# Patient Record
Sex: FEMALE | Race: WHITE | Employment: OTHER | URBAN - METROPOLITAN AREA
[De-identification: names, ages, dates, MRNs, and addresses within clinical notes are randomized per-mention and may not be internally consistent; named-entity substitution may affect disease eponyms.]

---

## 2018-10-08 ENCOUNTER — TELEPHONE (OUTPATIENT)
Dept: HEMATOLOGY | Age: 73
End: 2018-10-08

## 2018-10-12 ENCOUNTER — OFFICE VISIT (OUTPATIENT)
Dept: HEMATOLOGY | Age: 73
End: 2018-10-12

## 2018-10-12 VITALS
TEMPERATURE: 97.5 F | HEART RATE: 67 BPM | OXYGEN SATURATION: 97 % | SYSTOLIC BLOOD PRESSURE: 121 MMHG | DIASTOLIC BLOOD PRESSURE: 58 MMHG | WEIGHT: 165 LBS

## 2018-10-12 DIAGNOSIS — K74.3 PRIMARY BILIARY CHOLANGITIS (HCC): Primary | ICD-10-CM

## 2018-10-12 PROBLEM — R18.8 ASCITES: Status: ACTIVE | Noted: 2018-10-12

## 2018-10-12 PROBLEM — D69.6 THROMBOCYTOPENIA (HCC): Status: ACTIVE | Noted: 2018-10-12

## 2018-10-12 PROBLEM — K74.60 CIRRHOSIS (HCC): Status: ACTIVE | Noted: 2018-10-12

## 2018-10-12 RX ORDER — SPIRONOLACTONE 100 MG/1
TABLET, FILM COATED ORAL DAILY
COMMUNITY
End: 2020-02-10 | Stop reason: SDUPTHER

## 2018-10-12 RX ORDER — URSODIOL 500 MG/1
500 TABLET, FILM COATED ORAL
COMMUNITY
End: 2020-05-14

## 2018-10-12 RX ORDER — FUROSEMIDE 40 MG/1
TABLET ORAL DAILY
COMMUNITY
End: 2020-02-10 | Stop reason: SDUPTHER

## 2018-10-12 NOTE — PROGRESS NOTES
[address] [facility] Cristal Codey Matthews MD, SATHYA AlejandraSLD ABHISHEK Roberts, RICHARD Gamble, [facility]   ABHISHEK Solorio NP [address] [facility] 136 
  at [address] 
  [address] [place] [address], [facility] Mal Barajas  22. 
  [phone] FAX: [address] 
  [facility] 
  [address], [address] [facility], [address] 
  [phone] FAX: [phone] Patient Care Team: 
Leanne Lopez MD as PCP - [facility]) Problem List  Date Reviewed: 10/12/2018 Codes Class Noted Primary biliary cholangitis (HCC) ICD-10-CM: K74.3 ICD-9-CM: 571.6  10/12/2018 Cirrhosis ([facility] [address] ICD-10-CM: K74.60 ICD-9-CM: 571.5  10/12/2018 Thrombocytopenia ([facility] [address] ICD-10-CM: D69.6 ICD-9-CM: 287.5  10/12/2018 Ascites ICD-10-CM: R18.8 ICD-9-CM: 789.59  10/12/2018 The clinicians listed above have asked me to see Rangel Medina in consultation regarding management of cirrhosis secondary to [facility]. All medical records sent by the referring physicians were reviewed including imaging studies The patient is a 68 y.o.  female who was found to have chronic liver disease, cirrhosis and PBC in 2017 when she developed ascites. An assessment of liver fibrosis with biopsy or elastography has not been performed. Serologic evaluation for markers of chronic liver disease was negative for HCV, HBV, NEYMAR, The patient has developed the following complications of cirrhosis: ascites, The patient notes itching, swelling of the abdomen, The patient has not experienced pain in the right side over the liver, dry eyes, dry mouth, dark urine, The patient completes all daily activities without any functional limitations. ASSESSMENT AND PLAN: 
Cirrhosis Cirrhosis is secondary to [facility]. The diagnosis of cirrhosis is based upon laboratory studies, imaging and complications of cirrhosis. The CTP is 7. Child class B. The MELD score is 9. Primary Biliary Cholangitis The diagnosis is based upon serology, and an elevation in ALP. Serologic testing for causes of chronic liver disease were ordered. These were negative for  ASMA, AMA A liver biopsy has not been performed. Have performed laboratory testing to monitor liver function and degree of liver injury. This included BMP, hepatic panel, CBC with platelet count, INR. Liver transaminases are normal.  ALP is elevated. Total bilirubin is elevated. Serum albumin is depressed. The platelet count is depressed. The patient will be started on JOLIE at a dose of 1500 mg every day. The side effects of JOLIE including a 2% risk of itching and a 2% risk of diarrhea were discussed. Ascites Has resolved with current dose of diuretics. Will continue the current dose of diuretics at step 1. Lower extremity edema This has resolved with current dose of diuretics. Screening for Esophageal varices The patient has small esophageal varices without prior bleeding. The last EGD to assess for varices was performed in 3/2017. Will schedule for EGD to assess for varices and need for banding. Hepatic encephalopathy This has not developed to date. There is no need for treatment with lactulose and/or Xifaxan at this time. No need to restrict dietary protein at this time. Itching This is secondary to the underlying liver disease. It is mild and the patient does not feel like she needs any medical treatment for this at this time. This will be treated with Periactin up to TID if needed. Common anti-histamines such as benedryl, atarax are lkess effective for the itching of liver disease. Screening for Hepatocellular Carcinoma [facility] [address] screening has recently been performed and does not suggest [facility] [address] The next liver imaging study will be performed in 4/2019. Treatment of other medical problems in patients with chronic liver disease There are no contraindications for the patient to take any medications that are necessary for treatment of other medical issues. The patient does not consume alcohol daily. Normal doses of acetaminophen can be used for pain as needed. Normal doses of acetaminophen as recommended on the label are not hepatotoxic, even in patients with cirrhosis. The patient has cirrhosis. Patients with cirrhosis should not use NSAIDs if possible as this is associated with a higher rate of ILEANA. Counseling for alcohol in patients with chronic liver disease The patient has cirrhosis and was advised to be abstinent from all alcohol including non-alcoholic beer which does contain some alcohol. The patient does not consume any significant amount of alcohol. Thrombocytopenia This is secondary to cirrhosis. There is no evidence of overt bleeding. No treatment is required. The platelet count is adequate for the patient to undergo procedures without the need for platelet transfusion or platelet growth factors. Osteoporosis The risk of osteoporosis is increased in patients with PBC and cirrhosis. DEXA bone density to assess for osteoporosis has not been performed. This should be ordered by the patients primary care physician. Vaccinations Routine vaccinations against other bacterial and viral agents can be performed as indicated. Annual flu vaccination should be administered if indicated. ALLERGIES Allergies Allergen Reactions  Latex Hives MEDICATIONS Current Outpatient Prescriptions Medication Sig  
 ursodiol (ACTIGALL) 500 mg tablet Take 500 mg by mouth three (3) times daily (with meals).  furosemide (LASIX) 40 mg tablet Take  by mouth daily.  spironolactone (ALDACTONE) 100 mg tablet Take  by mouth daily. No current facility-administered medications for this visit. SYSTEM REVIEW NOT RELATED TO LIVER DISEASE OR REVIEWED ABOVE: 
Constitution systems: Negative for fever, chills, weight gain, weight loss. Eyes: Negative for visual changes. ENT: Negative for sore throat, painful swallowing. Respiratory: Negative for cough, hemoptysis, SOB. Cardiology: Negative for chest pain, palpitations. GI:  Negative for constipation or diarrhea. : Negative for urinary frequency, dysuria, hematuria, nocturia. Skin: Negative for rash. Hematology: Negative for easy bruising, blood clots. Musculo-skelatal: Negative for back pain, muscle pain, weakness. Neurologic: Negative for headaches, dizziness, vertigo, memory problems not related to HE. Psychology: Negative for anxiety, depression. FAMILY HISTORY: 
The patient is adopted and does not know any of his family history. SOCIAL HISTORY: 
The patient is . The patient has 3 children, and 15 grandchildren. The patient has never used tobacco products. The patient has never consumed significant amounts of alcohol. The patient used to work as . PHYSICAL EXAMINATION: 
Visit Vitals  /58 (BP 1 Location: Left arm, BP Patient Position: Sitting)  Pulse 67  Temp 97.5 °F (36.4 °C) (Tympanic)  Wt 165 lb (74.8 kg)  SpO2 97% General: No acute distress. Eyes: Sclera anicteric. ENT: No oral lesions. Thyroid normal. 
Nodes: No adenopathy. Skin: No spider angiomata. No jaundice. No palmar erythema. Respiratory: Lungs clear to auscultation. Cardiovascular: Regular heart rate. No murmurs. No JVD. Abdomen: Soft non-tender. Liver size normal to percussion/palpation. Spleen not palpable. No obvious ascites. Extremities: No edema. No muscle wasting. No gross arthritic changes. Neurologic: Alert and oriented. Cranial nerves grossly intact. No asterixis. LABORATORY STUDIES: 
Liver Kathryn of [address] [place] [address] & Units 10/12/2018 WBC 3.4 - 10.8 x10E3/uL 4.6 ANC 1.4 - 7.0 x10E3/uL 2.9 HGB 11.1 - 15.9 g/dL 13.8  - 379 x10E3/uL 130 (L) INR 0.8 - 1.2 1.2 AST 0 - 40 IU/L 23 ALT 0 - 32 IU/L 11 Alk Phos 39 - 117 IU/L 222 (H) Bili, Total 0.0 - 1.2 mg/dL 1.9 (H) Bili, Direct 0.00 - 0.40 mg/dL 0.82 (H) Albumin 3.5 - 4.8 g/dL 3.0 (L) BUN 8 - 27 mg/dL 11 Creat 0.57 - 1.00 mg/dL 0.92 Na 134 - 144 mmol/L 138  
K 3.5 - 5.2 mmol/L 4.1 Cl 96 - 106 mmol/L 105 CO2 20 - 29 mmol/L 21 Glucose 65 - 99 mg/dL 101 (H) SEROLOGIES: 
8/2018. HBsurface antigen negative, anti-HBcore total negative, anti-HBsurface negative, anti-HCV negative, NEYMAR negative Serologies Latest Ref Rng & Units 10/12/2018 NEYMAR, IFA  Negative ASMCA 0 - 19 Units 73 (H)  
M2 Ab 0.0 - 20.0 Units 25.2 (H) LIVER HISTOLOGY: 
Not available or performed ENDOSCOPIC PROCEDURES: 
3/2017. Colonoscopy by Dr Yina Haley. No ployps. 3/2017. EGD by Dr Yina Haley. Grade 1 esophageal varices. Mild portal gastropathy RADIOLOGY: 
10/2018. Ultrasound of liver. Echogenic consistent with cirrhosis. No liver mass lesions. No dilated bile ducts. No ascites. OTHER TESTING: 
Not available or performed FOLLOW-UP: 
All of the issues listed above in the Assessment and Plan were discussed with the patient. All questions were answered. The patient expressed a clear understanding of the above. [address] [address] in 4 weeks to assess response to JOLIE. MD Fei De Leon Dep[address] Huy De Esposito [address] [place] [address], [address] [facility], [facility]  22. 
[phone] [facility]

## 2018-10-12 NOTE — PROGRESS NOTES
Chief Complaint Patient presents with  New Patient Visit Vitals  /58 (BP 1 Location: Left arm, BP Patient Position: Sitting)  Pulse 67  Temp 97.5 °F (36.4 °C) (Tympanic)  Wt 165 lb (74.8 kg)  SpO2 97% PHQ over the last two weeks 10/12/2018 Little interest or pleasure in doing things Not at all Feeling down, depressed, irritable, or hopeless Not at all Total Score PHQ 2 0 Learning Assessment 10/12/2018 PRIMARY LEARNER Patient BARRIERS PRIMARY LEARNER NONE  
CO-LEARNER CAREGIVER No  
PRIMARY LANGUAGE ENGLISH  
LEARNER PREFERENCE PRIMARY LISTENING  
ANSWERED BY patient RELATIONSHIP SELF Abuse Screening Questionnaire 10/12/2018 Do you ever feel afraid of your partner? Madelyn Atkinson Are you in a relationship with someone who physically or mentally threatens you? Madelyn Atkinson Is it safe for you to go home?  Olivia Ochoa

## 2018-10-12 NOTE — MR AVS SNAPSHOT
[address] [facility] [phone] [address] [place] [address] 
[phone] Patient: Norm Potts MRN: [id number] SFY:[id number] Visit Information Date & Time Provider Department Dept. Phone Encounter #  
 10/12/2018  8:15 AM Taurus Joy Tamiko Corado of [facility] [id number] Follow-up Instructions Return in about 4 weeks (around 11/9/2018) for NP. Upcoming Health Maintenance Date Due Hepatitis C Screening 1945 DTaP/Tdap/Td series (1 - Tdap) 7/16/1966 Shingrix Vaccine Age 50> (1 of 2) 7/16/1995 BREAST CANCER SCRN MAMMOGRAM 7/16/1995 FOBT Q 1 YEAR AGE 50-75 7/16/1995 GLAUCOMA SCREENING Q2Y 7/16/2010 Bone Densitometry (Dexa) Screening 7/16/2010 Pneumococcal 65+ Low/Medium Risk (1 of 2 - PCV13) 7/16/2010 Influenza Age 5 to Adult 8/1/2018 Allergies as of 10/12/2018  Review Complete On: 10/12/2018 By: Aurelia Abarca Severity Noted Reaction Type Reactions Latex  10/12/2018    Hives Current Immunizations  Never Reviewed No immunizations on file. Not reviewed this visit You Were Diagnosed With   
  
 Codes Comments Primary biliary cholangitis (HCC)    -  Primary ICD-10-CM: C37.4 ICD-9-CM: 571.6 Vitals BP Pulse Temp Weight(growth percentile) SpO2 Smoking Status 121/58 (BP 1 Location: Left arm, BP Patient Position: Sitting) 67 97.5 °F (36.4 °C) (Tympanic) 165 lb (74.8 kg) 97% Never Smoker Vitals History Your Updated Medication List  
  
   
This list is accurate as of 10/12/18  9:57 AM.  Always use your most recent med list.  
  
  
  
  
 furosemide 40 mg tablet Commonly known as:  LASIX Take  by mouth daily. spironolactone 100 mg tablet Commonly known as:  ALDACTONE Take  by mouth daily. ursodiol 500 mg tablet Commonly known as:  ACTIGALL Take 500 mg by mouth three (3) times daily (with meals). We Performed the Following ACTIN (SMOOTH MUSCLE) ANTIBODY [id number] CPT(R)] ANTINUCLEAR ANTIBODIES, IFA [id number] CPT(R)] CBC WITH AUTOMATED DIFF [63948 CPT(R)] HEPATIC FUNCTION PANEL [87069 CPT(R)] METABOLIC PANEL, BASIC [36209 CPT(R)] MITOCHONDRIAL M2 AB [id number] CPT(R)] PROTHROMBIN TIME + INR [96283 CPT(R)] Follow-up Instructions Return in about 4 weeks (around 11/9/2018) for NP. To-Do List   
 10/12/2018 Imaging:  [employer] Introducing [facility]! Sasha Mendieta introduces [employer] patient portal. Now you can access parts of your medical record, email your doctor's office, and request medication refills online. 1. In your internet browser, go to [email or web address] 2. Click on the First Time User? Click Here link in the Sign In box. You will see the New Member Sign Up page. 3. Enter your [employer] Access Code exactly as it appears below. You will not need to use this code after youve completed the sign-up process. If you do not sign up before the expiration date, you must request a new code. · [employer] Access Code: [email or web address] Expires: 1/10/2019  8:02 AM 
 
4. Enter the last four digits of your Social Security Number (xxxx) and Date of Birth (mm/dd/yyyy) as indicated and click Submit. You will be taken to the next sign-up page. 5. Create a [employer] ID. This will be your [employer] login ID and cannot be changed, so think of one that is secure and easy to remember. 6. Create a [employer] password. You can change your password at any time. 7. Enter your Password Reset Question and Answer. This can be used at a later time if you forget your password. 8. Enter your e-mail address. You will receive e-mail notification when new information is available in [address] 9. Click Sign Up. You can now view and download portions of your medical record.  
10. Click the Download Summary menu link to download a portable copy of your medical information. If you have questions, please visit the Frequently Asked Questions section of the [employer] website. Remember, [employer] is NOT to be used for urgent needs. For medical emergencies, dial 911. Now available from your iPhone and Android! Please provide this summary of care documentation to your next provider. Your primary care clinician is listed as Eva Bro. If you have any questions after today's visit, please call [phone].

## 2018-10-13 LAB
BASOPHILS # BLD AUTO: 0 X10E3/UL (ref 0–0.2)
BASOPHILS NFR BLD AUTO: 0 %
EOSINOPHIL # BLD AUTO: 0.1 X10E3/UL (ref 0–0.4)
EOSINOPHIL NFR BLD AUTO: 2 %
ERYTHROCYTE [DISTWIDTH] IN BLOOD BY AUTOMATED COUNT: 13.9 % (ref 12.3–15.4)
HCT VFR BLD AUTO: 41.4 % (ref 34–46.6)
HGB BLD-MCNC: 13.8 G/DL (ref 11.1–15.9)
IMM GRANULOCYTES # BLD: 0 X10E3/UL (ref 0–0.1)
IMM GRANULOCYTES NFR BLD: 0 %
INR PPP: 1.2 (ref 0.8–1.2)
LYMPHOCYTES # BLD AUTO: 0.8 X10E3/UL (ref 0.7–3.1)
LYMPHOCYTES NFR BLD AUTO: 18 %
MCH RBC QN AUTO: 33.7 PG (ref 26.6–33)
MCHC RBC AUTO-ENTMCNC: 33.3 G/DL (ref 31.5–35.7)
MCV RBC AUTO: 101 FL (ref 79–97)
MONOCYTES # BLD AUTO: 0.8 X10E3/UL (ref 0.1–0.9)
MONOCYTES NFR BLD AUTO: 18 %
NEUTROPHILS # BLD AUTO: 2.9 X10E3/UL (ref 1.4–7)
NEUTROPHILS NFR BLD AUTO: 62 %
PLATELET # BLD AUTO: 130 X10E3/UL (ref 150–379)
PROTHROMBIN TIME: 11.9 SEC (ref 9.1–12)
RBC # BLD AUTO: 4.1 X10E6/UL (ref 3.77–5.28)
WBC # BLD AUTO: 4.6 X10E3/UL (ref 3.4–10.8)

## 2018-10-14 LAB
ALBUMIN SERPL-MCNC: 3 G/DL (ref 3.5–4.8)
ALP SERPL-CCNC: 222 IU/L (ref 39–117)
ALT SERPL-CCNC: 11 IU/L (ref 0–32)
AST SERPL-CCNC: 23 IU/L (ref 0–40)
BILIRUB DIRECT SERPL-MCNC: 0.82 MG/DL (ref 0–0.4)
BILIRUB SERPL-MCNC: 1.9 MG/DL (ref 0–1.2)
BUN SERPL-MCNC: 11 MG/DL (ref 8–27)
BUN/CREAT SERPL: 12 (ref 12–28)
CALCIUM SERPL-MCNC: 8.8 MG/DL (ref 8.7–10.3)
CHLORIDE SERPL-SCNC: 105 MMOL/L (ref 96–106)
CO2 SERPL-SCNC: 21 MMOL/L (ref 20–29)
CREAT SERPL-MCNC: 0.92 MG/DL (ref 0.57–1)
GLUCOSE SERPL-MCNC: 101 MG/DL (ref 65–99)
MITOCHONDRIA M2 IGG SER-ACNC: 25.2 UNITS (ref 0–20)
POTASSIUM SERPL-SCNC: 4.1 MMOL/L (ref 3.5–5.2)
PROT SERPL-MCNC: 6.9 G/DL (ref 6–8.5)
SODIUM SERPL-SCNC: 138 MMOL/L (ref 134–144)

## 2018-10-15 LAB
ACTIN IGG SERPL-ACNC: 73 UNITS (ref 0–19)
ANA TITR SER IF: NEGATIVE {TITER}

## 2018-10-18 ENCOUNTER — HOSPITAL ENCOUNTER (OUTPATIENT)
Dept: ULTRASOUND IMAGING | Age: 73
Discharge: HOME | End: 2018-10-18
Attending: INTERNAL MEDICINE
Payer: MEDICARE

## 2018-10-18 DIAGNOSIS — K74.3 PRIMARY BILIARY CHOLANGITIS (HCC): ICD-10-CM

## 2018-10-18 PROCEDURE — 76705 ECHO EXAM OF ABDOMEN: CPT

## 2018-11-12 ENCOUNTER — TELEPHONE (OUTPATIENT)
Dept: HEMATOLOGY | Age: 73
End: 2018-11-12

## 2018-11-12 NOTE — TELEPHONE ENCOUNTER
I called the patient to remind her of her 11/14 appt with Candice Chery at 2 PM. Patient confirmed that she will attend this appt. She had no questions at this time.     Kay Morse

## 2018-11-14 ENCOUNTER — OFFICE VISIT (OUTPATIENT)
Dept: HEMATOLOGY | Age: 73
End: 2018-11-14

## 2018-11-14 VITALS
WEIGHT: 181 LBS | HEART RATE: 70 BPM | BODY MASS INDEX: 33.31 KG/M2 | SYSTOLIC BLOOD PRESSURE: 109 MMHG | TEMPERATURE: 97.4 F | DIASTOLIC BLOOD PRESSURE: 54 MMHG | OXYGEN SATURATION: 96 % | HEIGHT: 62 IN

## 2018-11-14 DIAGNOSIS — K74.69 OTHER CIRRHOSIS OF LIVER (HCC): ICD-10-CM

## 2018-11-14 DIAGNOSIS — K74.3 PRIMARY BILIARY CHOLANGITIS (HCC): Primary | ICD-10-CM

## 2018-11-14 NOTE — PROGRESS NOTES
1. Have you been to the ER, urgent care clinic since your last visit? Hospitalized since your last visit? No    2. Have you seen or consulted any other health care providers outside of the [address] [place] [address] since your last visit? Include any pap smears or colon screening. Yes Where: Yes, PCP     Chief Complaint   Patient presents with    Follow-up     Visit Vitals  /54 (BP 1 Location: Left arm, BP Patient Position: Sitting)   Pulse 70   Temp 97.4 °F (36.3 °C) (Tympanic)   Ht 5' 2\" (1.575 m)   Wt 181 lb (82.1 kg)   SpO2 96%   BMI 33.11 kg/m²     PHQ over the last two weeks 11/14/2018   Little interest or pleasure in doing things Not at all   Feeling down, depressed, irritable, or hopeless Not at all   Total Score PHQ 2 0     Fall Risk Assessment, last 12 mths 11/14/2018   Able to walk? Yes   Fall in past 12 months? No       Learning Assessment 11/14/2018   PRIMARY LEARNER Patient   BARRIERS PRIMARY LEARNER NONE   CO-LEARNER CAREGIVER No   PRIMARY LANGUAGE ENGLISH   LEARNER PREFERENCE PRIMARY LISTENING   ANSWERED BY patient   RELATIONSHIP SELF     Abuse Screening Questionnaire 11/14/2018   Do you ever feel afraid of your partner? N   Are you in a relationship with someone who physically or mentally threatens you? N   Is it safe for you to go home?  Hannah Zayas

## 2018-11-14 NOTE — PROGRESS NOTES
[address] [address], [place], [address], [place], [place]       Crispin De La Rosa, ABHISHEK Farley, RICHARD Maldonado, [facility]   Esteban Arriaza, ABHISHEK Diallo, ABHISHEK Pollard [facility], [facility] [address]    at [facility]    [address] [facility], [address]  22.    [phone]    FAX: [address] [place] [address]    at [address], [address]    [phone]    FAX: [phone]     Patient Care Team:  Estefania Grover MD as PCP - General (Family Practice)    Problem List  Date Reviewed: 10/28/2018          Codes Class Noted    Primary biliary cholangitis [facility]) ICD-10-CM: K74.3  ICD-9-CM: 571.6  10/12/2018        Cirrhosis ([facility] [address] ICD-10-CM: K74.60  ICD-9-CM: 571.5  10/12/2018        Thrombocytopenia ([facility] [address] ICD-10-CM: D69.6  ICD-9-CM: 287.5  10/12/2018        Ascites ICD-10-CM: R18.8  ICD-9-CM: 789.59  10/12/2018            Wendy Maza returns to the [address] for management of primary biliary cholangitis. The active problem list, all pertinent past medical history, medications, endoscopic studies, radiologic findings and laboratory findings related to the liver disorder were reviewed with the patient. The patient is a 68 y.o.  female who was found to have chronic liver disease, cirrhosis and PBC in 2017 when she developed ascites. An assessment of liver fibrosis with biopsy or elastography has not been performed. Serologic evaluation for markers of chronic liver disease was negative for HCV, HBV, NEYMAR. Positive for AMA. The patient has developed the following complications of cirrhosis: ascites and lower extremity edema. The patient notes itching.      The patient has not experienced pain in the right side over the liver, dry eyes, dry mouth, dark urine,     The patient completes all daily activities without any functional limitations. She is tolerating the JOLIE without incident. She is curious about the medication Dr. Monalisa Schilling had mentioned to her which was a once a week pill. This was likely Jaymie Custard, as with cirrhosis, it is once a week dosing. 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 The diagnosis of cirrhosis is based upon laboratory studies, imaging and complications of cirrhosis. The CTP is 7. Child class B. The MELD score is 10. Primary biliary cholangitis  The diagnosis is based upon serology and an elevation in ALP. Serologic testing for causes of chronic liver disease were ordered. These were positive for ASMA and  AMA. A liver biopsy has not been performed. Have performed laboratory testing to monitor liver function and degree of liver injury. This included BMP, hepatic panel, CBC with platelet count and INR. Liver transaminases are normal. ALP is elevated. Total bilirubin is elevated. Serum albumin is depressed. The platelet count is depressed. The patient is currently on JOLIE at a dose of 1500 mg every day. The side effects of JOLIE including a 2% risk of itching and a 2% risk of diarrhea were discussed. I looked into ordering her Jaymie Custard, but is not formulary and comes up as \"non reimburseable. \" Have asked Sobia Zuniga to ask patient if she would still like to proceed. Ascites   Has resolved with current dose of diuretics. Will continue the current dose of diuretics at step 1. Lower extremity edema  This has resolved with current dose of diuretics. Screening for esophageal varices   The patient has small esophageal varices without prior bleeding. The last EGD to assess for varices was performed in 3/2017. Will schedule for EGD to assess for varices and need for banding. Hepatic encephalopathy   This has not developed to date.     There is no need for treatment with lactulose and/or Xifaxan at this time. No need to restrict dietary protein at this time. Itching   This is secondary to the underlying liver disease. It is mild and the patient does not feel like she needs any medical treatment for this at this time. This will be treated with Periactin up to TID if needed. Common antihistamines such as Benadryl, atarax are less effective for the itching of liver disease. Essential lavender oil is helping her with this symptom. Screening for hepatocellular carcinoma  HCC screening has recently been performed and does not suggest [address] The next liver imaging study will be performed in 4/2019. Treatment of other medical problems in patients with chronic liver disease  There are no contraindications for the patient to take any medications that are necessary for treatment of other medical issues. The patient does not consume alcohol daily. Normal doses of acetaminophen can be used for pain as needed. Normal doses of acetaminophen as recommended on the label are not hepatotoxic, even in patients with cirrhosis. The patient has cirrhosis. Patients with cirrhosis should not use NSAIDs if possible as this is associated with a higher rate of ILEANA. Counseling for alcohol in patients with chronic liver disease  The patient does not consume any significant amount of alcohol. Thrombocytopenia   This is secondary to cirrhosis. There is no evidence of overt bleeding. No treatment is required. The platelet count is adequate for the patient to undergo procedures without the need for platelet transfusion or platelet growth factors. Osteoporosis  The risk of osteoporosis is increased in patients with PBC and cirrhosis. DEXA bone density to assess for osteoporosis has not been performed. This should be ordered by the patients primary care physician. Vaccinations   Routine vaccinations against other bacterial and viral agents can be performed as indicated.   Annual flu vaccination should be administered if indicated. ALLERGIES  Allergies   Allergen Reactions    Latex Hives     MEDICATIONS  Current Outpatient Medications   Medication Sig    ursodiol (ACTIGALL) 500 mg tablet Take 500 mg by mouth three (3) times daily (with meals).  furosemide (LASIX) 40 mg tablet Take  by mouth daily.  spironolactone (ALDACTONE) 100 mg tablet Take  by mouth daily. No current facility-administered medications for this visit. SYSTEM REVIEW NOT RELATED TO LIVER DISEASE OR REVIEWED ABOVE:  Constitution systems: Negative for fever, chills, weight gain, weight loss. Eyes: Negative for visual changes. ENT: Negative for sore throat, painful swallowing. Respiratory: Negative for cough, hemoptysis, SOB. Cardiology: Negative for chest pain, palpitations. GI:  Negative for constipation or diarrhea. : Negative for urinary frequency, dysuria, hematuria, nocturia. Skin: Negative for rash. Hematology: Negative for easy bruising, blood clots. Musculo-skeletal: Negative for back pain, muscle pain, weakness. Neurologic: Negative for headaches, dizziness, vertigo, memory problems not related to HE. Psychology: Negative for anxiety, depression. FAMILY HISTORY:  The patient is adopted and does not know any of his family history. SOCIAL HISTORY:  The patient is . The patient has 3 children and 15 grandchildren. The patient has never used tobacco products. The patient has never consumed significant amounts of alcohol. The patient used to work as . PHYSICAL EXAMINATION:  Visit Vitals  /54 (BP 1 Location: Left arm, BP Patient Position: Sitting)   Pulse 70   Temp 97.4 °F (36.3 °C) (Tympanic)   Ht 5' 2\" (1.575 m)   Wt 181 lb (82.1 kg)   SpO2 96%   BMI 33.11 kg/m²     General: No acute distress. Eyes: Sclera anicteric. ENT: No oral lesions. Nodes: No adenopathy. Skin: No spider angiomata. No jaundice.  No palmar erythema. Respiratory: Lungs clear to auscultation. Cardiovascular: Regular heart rate. No murmurs. No JVD. Abdomen: Soft non-tender. Liver size normal to percussion/palpation. Spleen not palpable. No obvious ascites. Extremities: No edema. No muscle wasting. No gross arthritic changes. Neurologic: Alert and oriented. Cranial nerves grossly intact. No asterixis. LABORATORY STUDIES:  Liver [place] of [address] Units 10/12/2018   WBC 3.4 - 10.8 x10E3/uL 4.6   ANC 1.4 - 7.0 x10E3/uL 2.9   HGB 11.1 - 15.9 g/dL 13.8    - 379 x10E3/uL 130 (L)   INR 0.8 - 1.2 1.2   AST 0 - 40 IU/L 23   ALT 0 - 32 IU/L 11   Alk Phos 39 - 117 IU/L 222 (H)   Bili, Total 0.0 - 1.2 mg/dL 1.9 (H)   Bili, Direct 0.00 - 0.40 mg/dL 0.82 (H)   Albumin 3.5 - 4.8 g/dL 3.0 (L)   BUN 8 - 27 mg/dL 11   Creat 0.57 - 1.00 mg/dL 0.92   Na 134 - 144 mmol/L 138   K 3.5 - 5.2 mmol/L 4.1   Cl 96 - 106 mmol/L 105   CO2 20 - 29 mmol/L 21   Glucose 65 - 99 mg/dL 101 (H)     SEROLOGIES:  8/2018. HBsurface antigen negative, anti-HBcore total negative, anti-HBsurface negative, anti-HCV negative, NEYMAR negative    Serologies Latest Ref Rng & Units 10/12/2018   NEYMAR, IFA  Negative   ASMCA 0 - 19 Units 73 (H)   M2 Ab 0.0 - 20.0 Units 25.2 (H)     LIVER HISTOLOGY:  Not available or performed    ENDOSCOPIC PROCEDURES:  3/2017. Colonoscopy by Dr Yasemin Dyer. No polyps. 3/2017. EGD by Dr Yasemin Dyer. Grade 1 esophageal varices. Mild portal gastropathy    RADIOLOGY:  10/2018. Ultrasound of liver. Echogenic consistent with cirrhosis. No liver mass lesions. No dilated bile ducts. No ascites. OTHER TESTING:  Not available or performed    FOLLOW-UP:  All of the issues listed above in the assessment and plan were discussed with the patient. All questions were answered. The patient expressed a clear understanding of the above. [address] [address] in 2 months to assess labs, weight loss and effect of JOLIE on PBC.      Brooklynn Escobar, ACNP-BC  Liver [place] of [address] [place], [place] [address], [address] [address]  22.  [phone]

## 2018-11-15 LAB
AFP L3 MFR SERPL: 6.3 % (ref 0–9.9)
AFP SERPL-MCNC: 5 NG/ML (ref 0–8)
ALBUMIN SERPL-MCNC: 3.2 G/DL (ref 3.5–4.8)
ALP SERPL-CCNC: 208 IU/L (ref 39–117)
ALT SERPL-CCNC: 10 IU/L (ref 0–32)
AST SERPL-CCNC: 23 IU/L (ref 0–40)
BILIRUB DIRECT SERPL-MCNC: 0.9 MG/DL (ref 0–0.4)
BILIRUB SERPL-MCNC: 1.7 MG/DL (ref 0–1.2)
BUN SERPL-MCNC: 16 MG/DL (ref 8–27)
BUN/CREAT SERPL: 17 (ref 12–28)
CALCIUM SERPL-MCNC: 8.8 MG/DL (ref 8.7–10.3)
CHLORIDE SERPL-SCNC: 102 MMOL/L (ref 96–106)
CO2 SERPL-SCNC: 25 MMOL/L (ref 20–29)
CREAT SERPL-MCNC: 0.92 MG/DL (ref 0.57–1)
ERYTHROCYTE [DISTWIDTH] IN BLOOD BY AUTOMATED COUNT: 13.9 % (ref 12.3–15.4)
GLUCOSE SERPL-MCNC: 107 MG/DL (ref 65–99)
HCT VFR BLD AUTO: 41.9 % (ref 34–46.6)
HGB BLD-MCNC: 14.3 G/DL (ref 11.1–15.9)
INR PPP: 1.1 (ref 0.8–1.2)
MCH RBC QN AUTO: 33.8 PG (ref 26.6–33)
MCHC RBC AUTO-ENTMCNC: 34.1 G/DL (ref 31.5–35.7)
MCV RBC AUTO: 99 FL (ref 79–97)
PLATELET # BLD AUTO: 135 X10E3/UL (ref 150–379)
POTASSIUM SERPL-SCNC: 4.5 MMOL/L (ref 3.5–5.2)
PROT SERPL-MCNC: 7.6 G/DL (ref 6–8.5)
PROTHROMBIN TIME: 11.7 SEC (ref 9.1–12)
RBC # BLD AUTO: 4.23 X10E6/UL (ref 3.77–5.28)
SODIUM SERPL-SCNC: 140 MMOL/L (ref 134–144)
WBC # BLD AUTO: 4.6 X10E3/UL (ref 3.4–10.8)

## 2019-01-14 ENCOUNTER — OFFICE VISIT (OUTPATIENT)
Dept: HEMATOLOGY | Age: 74
End: 2019-01-14

## 2019-01-14 VITALS
HEART RATE: 69 BPM | OXYGEN SATURATION: 96 % | TEMPERATURE: 97 F | BODY MASS INDEX: 33.82 KG/M2 | SYSTOLIC BLOOD PRESSURE: 115 MMHG | HEIGHT: 62 IN | DIASTOLIC BLOOD PRESSURE: 56 MMHG | WEIGHT: 183.8 LBS

## 2019-01-14 DIAGNOSIS — K74.3 PRIMARY BILIARY CHOLANGITIS (HCC): Primary | ICD-10-CM

## 2019-01-14 NOTE — PROGRESS NOTES
[address] Cristal Lomeli MD, [place] , FAASLD ABHISHEK To PA-C Sonia Caul, [facility]   ABHISHEK Fuller NP [facility] 136 
  at [address], [facility] Mal [place]  22. 
  [phone] FAX: [address] 
  [facility] 
  [address], [address] 98 [facility], [address] 
  [phone] FAX: [phone] Patient Care Team: 
Scott Castrejon MD as PCP - [facility]) Problem List  Date Reviewed: 12/4/2018 Codes Class Noted Primary biliary cholangitis (HCC) ICD-10-CM: K74.3 ICD-9-CM: 571.6  10/12/2018 Cirrhosis ([facility] [address] ICD-10-CM: K74.60 ICD-9-CM: 571.5  10/12/2018 Thrombocytopenia ([facility] [address] ICD-10-CM: D69.6 ICD-9-CM: 287.5  10/12/2018 Ascites ICD-10-CM: R18.8 ICD-9-CM: 789.59  10/12/2018 Nestor Arellano returns to the [place] [address] for management of primary biliary cholangitis. The active problem list, all pertinent past medical history, medications, endoscopic studies, radiologic findings and laboratory findings related to the liver disorder were reviewed with the patient. The patient is a 68 y.o.  female who was found to have chronic liver disease, cirrhosis and PBC in 2017 when she developed ascites. An assessment of liver fibrosis with biopsy or elastography has not been performed. Serologic evaluation for markers of chronic liver disease was negative for HCV, HBV, NEYMAR. Positive for AMA. The patient has developed the following complications of cirrhosis: ascites and lower extremity edema. The patient notes itching.   
 
The patient has not experienced pain in the right side over the liver, dry eyes, dry mouth or dark urine. The patient completes all daily activities without any functional limitations. She is tolerating the JOLIE without incident. ASSESSMENT AND PLAN: 
 
Cirrhosis Cirrhosis is secondary to [facility]. The diagnosis of cirrhosis is based upon laboratory studies, imaging and complications of cirrhosis. The CTP is 6. Child class A. The MELD score is 10. Primary biliary cholangitis The diagnosis is based upon serology and an elevation in ALP. Serologic testing for causes of chronic liver disease were ordered. These were positive for ASMA and AMA. A liver biopsy has not been performed. Have performed laboratory testing to monitor liver function and degree of liver injury. This included BMP, hepatic panel, CBC with platelet count and INR. Liver transaminases are normal. ALP is elevated. Total bilirubin is elevated. Serum albumin is depressed. The platelet count is depressed. The patient is currently on JOLIE at a dose of 1500 mg every day. Ascites Has resolved with current dose of diuretics. Will continue the current dose of diuretics at step 1. Lower extremity edema This has resolved with current dose of diuretics. Screening for esophageal varices The patient has small esophageal varices without prior bleeding. The last EGD to assess for varices was performed in 3/2017. EGD is scheduled for 3/2019 to assess for varices and need for banding. Hepatic encephalopathy This has not developed to date. There is no need for treatment with lactulose and/or Xifaxan at this time. No need to restrict dietary protein at this time. Itching This is secondary to the underlying liver disease. It is mild and the patient does not feel like she needs any medical treatment for this at this time. This will be treated with Periactin up to TID if needed.  Common antihistamines such as Benadryl, atarax are less effective for the itching of liver disease. Essential lavender oil is helping her with this symptom. Screening for hepatocellular carcinoma [facility] [address] screening has recently been performed and does not suggest [address] The next liver imaging study will be performed in 4/2019. Treatment of other medical problems in patients with chronic liver disease There are no contraindications for the patient to take any medications that are necessary for treatment of other medical issues. The patient does not consume alcohol daily. Normal doses of acetaminophen can be used for pain as needed. Normal doses of acetaminophen as recommended on the label are not hepatotoxic, even in patients with cirrhosis. The patient has cirrhosis. Patients with cirrhosis should not use NSAIDs if possible as this is associated with a higher rate of ILEANA. Counseling for alcohol in patients with chronic liver disease The patient does not consume any significant amount of alcohol. Thrombocytopenia This is secondary to cirrhosis. There is no evidence of overt bleeding. No treatment is required. The platelet count is adequate for the patient to undergo procedures without the need for platelet transfusion or platelet growth factors. Osteoporosis The risk of osteoporosis is increased in patients with PBC and cirrhosis. DEXA bone density to assess for osteoporosis has not been performed. This should be ordered by the patient's primary care physician. Vaccinations Routine vaccinations against other bacterial and viral agents can be performed as indicated. Annual flu vaccination should be administered if indicated. ALLERGIES Allergies Allergen Reactions  Latex Hives MEDICATIONS Current Outpatient Medications Medication Sig  
 ursodiol (ACTIGALL) 500 mg tablet Take 500 mg by mouth three (3) times daily (with meals).  furosemide (LASIX) 40 mg tablet Take  by mouth daily.  spironolactone (ALDACTONE) 100 mg tablet Take  by mouth daily. No current facility-administered medications for this visit. SYSTEM REVIEW NOT RELATED TO LIVER DISEASE OR REVIEWED ABOVE: 
Constitution systems: Negative for fever, chills, weight gain, weight loss. Eyes: Negative for visual changes. ENT: Negative for sore throat, painful swallowing. Respiratory: Negative for cough, hemoptysis, SOB. Cardiology: Negative for chest pain, palpitations. GI:  Negative for constipation or diarrhea. : Negative for urinary frequency, dysuria, hematuria, nocturia. Skin: Negative for rash. Hematology: Negative for easy bruising, blood clots. Musculo-skeletal: Negative for back pain, muscle pain, weakness. Neurologic: Negative for headaches, dizziness, vertigo, memory problems not related to HE. Psychology: Negative for anxiety, depression. FAMILY HISTORY: 
The patient is adopted and does not know any of her family history. SOCIAL HISTORY: 
The patient is . The patient has 3 children and 15 grandchildren. The patient has never used tobacco products. The patient has never consumed significant amounts of alcohol. The patient used to work as . PHYSICAL EXAMINATION: 
Visit Vitals /56 (BP 1 Location: Left arm, BP Patient Position: Sitting) Pulse 69 Temp 97 °F (36.1 °C) (Tympanic) Ht 5' 2\" (1.575 m) Wt 183 lb 12.8 oz (83.4 kg) SpO2 96% BMI 33.62 kg/m² General: No acute distress. Eyes: Sclera anicteric. ENT: No oral lesions. Nodes: No adenopathy. Skin: No spider angiomata. No jaundice. No palmar erythema. Respiratory: Lungs clear to auscultation. Cardiovascular: Regular heart rate. No murmurs. No JVD. Abdomen: Soft non-tender. Liver size normal to percussion/palpation. Spleen not palpable. No obvious ascites. Extremities: No edema. No muscle wasting. No gross arthritic changes. Neurologic: Alert and oriented. Cranial nerves grossly intact. No asterixis. LABORATORY STUDIES: 
Liver [place] of [address] [place] [address] & Units 11/14/2018 10/12/2018 WBC 3.4 - 10.8 x10E3/uL 4.6 4.6 ANC 1.4 - 7.0 x10E3/uL  2.9 HGB 11.1 - 15.9 g/dL 14.3 13.8  - 379 x10E3/uL 135 (L) 130 (L) INR 0.8 - 1.2 1.1 1.2 AST 0 - 40 IU/L 23 23 ALT 0 - 32 IU/L 10 11 Alk Phos 39 - 117 IU/L 208 (H) 222 (H) Bili, Total 0.0 - 1.2 mg/dL 1.7 (H) 1.9 (H) Bili, Direct 0.00 - 0.40 mg/dL 0.90 (H) 0.82 (H) Albumin 3.5 - 4.8 g/dL 3.2 (L) 3.0 (L) BUN 8 - 27 mg/dL 16 11 Creat 0.57 - 1.00 mg/dL 0.92 0.92 Na 134 - 144 mmol/L 140 138  
K 3.5 - 5.2 mmol/L 4.5 4.1 Cl 96 - 106 mmol/L 102 105 CO2 20 - 29 mmol/L 25 21 Glucose 65 - 99 mg/dL 107 (H) 101 (H) SEROLOGIES: 
8/2018. HBsurface antigen negative, anti-HBcore total negative, anti-HBsurface negative, anti-HCV negative, NEYMAR negative Serologies Latest Ref Rng & Units 10/12/2018 NEYMAR, IFA  Negative ASMCA 0 - 19 Units 73 (H)  
M2 Ab 0.0 - 20.0 Units 25.2 (H) LIVER HISTOLOGY: 
Not available or performed ENDOSCOPIC PROCEDURES: 
3/2017. Colonoscopy by Dr Michael Breaux. No polyps. 3/2017. EGD by Dr Michael Breaux. Grade 1 esophageal varices. Mild portal gastropathy. RADIOLOGY: 
10/2018. Ultrasound of liver. Echogenic consistent with cirrhosis. No liver mass lesions. No dilated bile ducts. No ascites. OTHER TESTING: 
Not available or performed FOLLOW-UP: 
All of the issues listed above in the assessment and plan were discussed with the patient. All questions were answered. The patient expressed a clear understanding of the above. [address] [address] in 3 months to assess labs, weight loss and effect of JOLIE. Kenneth Obregon, [facility] Liver [place] [address] Anthony Al[place] , [address]  [address]. 
[phone]

## 2019-01-14 NOTE — PROGRESS NOTES
1. Have you been to the ER, urgent care clinic since your last visit? Hospitalized since your last visit? No 
 
2. Have you seen or consulted any other health care providers outside of the [address] [place] [address] since your last visit? Include any pap smears or colon screening. No  
 
Chief Complaint Patient presents with  Follow-up Visit Vitals /56 (BP 1 Location: Left arm, BP Patient Position: Sitting) Pulse 69 Temp 97 °F (36.1 °C) (Tympanic) Ht 5' 2\" (1.575 m) Wt 183 lb 12.8 oz (83.4 kg) SpO2 96% BMI 33.62 kg/m² PHQ over the last two weeks 1/14/2019 Little interest or pleasure in doing things Not at all Feeling down, depressed, irritable, or hopeless Not at all Total Score PHQ 2 0 Learning Assessment 1/14/2019 PRIMARY LEARNER Patient BARRIERS PRIMARY LEARNER NONE  
CO-LEARNER CAREGIVER No  
PRIMARY LANGUAGE ENGLISH  
LEARNER PREFERENCE PRIMARY LISTENING  
ANSWERED BY patient RELATIONSHIP SELF Abuse Screening Questionnaire 1/14/2019 Do you ever feel afraid of your partner? Eva Canal Are you in a relationship with someone who physically or mentally threatens you? Eva Canal Is it safe for you to go home?  Kenyon Mendoza

## 2019-01-15 LAB
ALBUMIN SERPL-MCNC: 3.1 G/DL (ref 3.5–4.8)
ALP SERPL-CCNC: 223 IU/L (ref 39–117)
ALT SERPL-CCNC: 11 IU/L (ref 0–32)
AST SERPL-CCNC: 22 IU/L (ref 0–40)
BILIRUB DIRECT SERPL-MCNC: 0.8 MG/DL (ref 0–0.4)
BILIRUB SERPL-MCNC: 2 MG/DL (ref 0–1.2)
BUN SERPL-MCNC: 13 MG/DL (ref 8–27)
BUN/CREAT SERPL: 18 (ref 12–28)
CALCIUM SERPL-MCNC: 8.4 MG/DL (ref 8.7–10.3)
CHLORIDE SERPL-SCNC: 104 MMOL/L (ref 96–106)
CO2 SERPL-SCNC: 25 MMOL/L (ref 20–29)
CREAT SERPL-MCNC: 0.74 MG/DL (ref 0.57–1)
ERYTHROCYTE [DISTWIDTH] IN BLOOD BY AUTOMATED COUNT: 14.1 % (ref 12.3–15.4)
GLUCOSE SERPL-MCNC: 94 MG/DL (ref 65–99)
HCT VFR BLD AUTO: 39.2 % (ref 34–46.6)
HGB BLD-MCNC: 13.5 G/DL (ref 11.1–15.9)
INR PPP: 1.1 (ref 0.8–1.2)
MCH RBC QN AUTO: 33.7 PG (ref 26.6–33)
MCHC RBC AUTO-ENTMCNC: 34.4 G/DL (ref 31.5–35.7)
MCV RBC AUTO: 98 FL (ref 79–97)
PLATELET # BLD AUTO: 116 X10E3/UL (ref 150–379)
POTASSIUM SERPL-SCNC: 4.1 MMOL/L (ref 3.5–5.2)
PROT SERPL-MCNC: 6.9 G/DL (ref 6–8.5)
PROTHROMBIN TIME: 11.8 SEC (ref 9.1–12)
RBC # BLD AUTO: 4.01 X10E6/UL (ref 3.77–5.28)
SODIUM SERPL-SCNC: 140 MMOL/L (ref 134–144)
WBC # BLD AUTO: 3.9 X10E3/UL (ref 3.4–10.8)

## 2019-01-24 ENCOUNTER — TELEPHONE (OUTPATIENT)
Dept: HEMATOLOGY | Age: 74
End: 2019-01-24

## 2019-01-24 NOTE — TELEPHONE ENCOUNTER
The patient called and stated that she is NOT able to take her medication she is NOT able to keep anything down. Her symptoms include nausea, vomiting, she was having hot flashes and chills. This has been happening since yesterday.

## 2019-01-24 NOTE — TELEPHONE ENCOUNTER
Patient called asking to speak with a nurse. Patient stated she is still not feeling well. Symptoms include nausea, vomiting clear liquid, and her \"stomach feels full. \"  Patient stated the fullness is going to the \"top of my stomach - even with my rib cage. \" Patient denies fever or chills. Patient stated she cannot eat because her stomach feels so full. When asked patient stated she does not feel like she has a stomach virus. She stated she has had a paracentesis before and feels she needs one now. Patient stated symptoms came on late last night and this morning. Spoke with Hilaria Chinchilla NP who recommendation was for patient to got the ED for evaluation. Informed patient. Patient expressed and understanding and had no further questions.

## 2019-01-28 NOTE — PROGRESS NOTES
Left VM for pt - labs same, checking to see how she was doing since poorly at end of last week. Liver function good.

## 2019-03-21 ENCOUNTER — ANESTHESIA (OUTPATIENT)
Dept: ENDOSCOPY | Age: 74
End: 2019-03-21
Payer: MEDICARE

## 2019-03-21 ENCOUNTER — ANESTHESIA EVENT (OUTPATIENT)
Dept: ENDOSCOPY | Age: 74
End: 2019-03-21
Payer: MEDICARE

## 2019-03-21 ENCOUNTER — HOSPITAL ENCOUNTER (OUTPATIENT)
Age: 74
Setting detail: OUTPATIENT SURGERY
Discharge: HOME | End: 2019-03-21
Attending: INTERNAL MEDICINE | Admitting: INTERNAL MEDICINE
Payer: MEDICARE

## 2019-03-21 VITALS
HEART RATE: 72 BPM | BODY MASS INDEX: 33.81 KG/M2 | TEMPERATURE: 97.6 F | HEIGHT: 62 IN | OXYGEN SATURATION: 97 % | WEIGHT: 183.75 LBS | SYSTOLIC BLOOD PRESSURE: 119 MMHG | RESPIRATION RATE: 28 BRPM | DIASTOLIC BLOOD PRESSURE: 61 MMHG

## 2019-03-21 PROCEDURE — 77030009426 HC FCPS BIOP ENDOSC BSC -B: Performed by: INTERNAL MEDICINE

## 2019-03-21 PROCEDURE — 74011250636 HC RX REV CODE- 250/636

## 2019-03-21 PROCEDURE — 76060000031 HC ANESTHESIA FIRST 0.5 HR: Performed by: INTERNAL MEDICINE

## 2019-03-21 PROCEDURE — 76040000019: Performed by: INTERNAL MEDICINE

## 2019-03-21 PROCEDURE — 88305 TISSUE EXAM BY PATHOLOGIST: CPT

## 2019-03-21 RX ORDER — PROPOFOL 10 MG/ML
INJECTION, EMULSION INTRAVENOUS AS NEEDED
Status: DISCONTINUED | OUTPATIENT
Start: 2019-03-21 | End: 2019-03-21 | Stop reason: HOSPADM

## 2019-03-21 RX ORDER — DEXTROMETHORPHAN/PSEUDOEPHED 2.5-7.5/.8
1.2 DROPS ORAL
Status: DISCONTINUED | OUTPATIENT
Start: 2019-03-21 | End: 2019-03-21 | Stop reason: HOSPADM

## 2019-03-21 RX ORDER — SODIUM CHLORIDE 9 MG/ML
50 INJECTION, SOLUTION INTRAVENOUS CONTINUOUS
Status: DISCONTINUED | OUTPATIENT
Start: 2019-03-21 | End: 2019-03-21 | Stop reason: HOSPADM

## 2019-03-21 RX ORDER — SODIUM CHLORIDE 0.9 % (FLUSH) 0.9 %
5-40 SYRINGE (ML) INJECTION EVERY 8 HOURS
Status: DISCONTINUED | OUTPATIENT
Start: 2019-03-21 | End: 2019-03-21 | Stop reason: HOSPADM

## 2019-03-21 RX ORDER — MIDAZOLAM HYDROCHLORIDE 1 MG/ML
5-10 INJECTION, SOLUTION INTRAMUSCULAR; INTRAVENOUS
Status: DISCONTINUED | OUTPATIENT
Start: 2019-03-21 | End: 2019-03-21 | Stop reason: HOSPADM

## 2019-03-21 RX ORDER — FLUMAZENIL 0.1 MG/ML
0.2 INJECTION INTRAVENOUS
Status: DISCONTINUED | OUTPATIENT
Start: 2019-03-21 | End: 2019-03-21 | Stop reason: HOSPADM

## 2019-03-21 RX ORDER — FENTANYL CITRATE 50 UG/ML
50-200 INJECTION, SOLUTION INTRAMUSCULAR; INTRAVENOUS
Status: DISCONTINUED | OUTPATIENT
Start: 2019-03-21 | End: 2019-03-21 | Stop reason: HOSPADM

## 2019-03-21 RX ORDER — SODIUM CHLORIDE 9 MG/ML
INJECTION, SOLUTION INTRAVENOUS
Status: DISCONTINUED | OUTPATIENT
Start: 2019-03-21 | End: 2019-03-21 | Stop reason: HOSPADM

## 2019-03-21 RX ORDER — LIDOCAINE HYDROCHLORIDE 20 MG/ML
INJECTION, SOLUTION EPIDURAL; INFILTRATION; INTRACAUDAL; PERINEURAL AS NEEDED
Status: DISCONTINUED | OUTPATIENT
Start: 2019-03-21 | End: 2019-03-21 | Stop reason: HOSPADM

## 2019-03-21 RX ORDER — ATROPINE SULFATE 0.1 MG/ML
0.5 INJECTION INTRAVENOUS
Status: DISCONTINUED | OUTPATIENT
Start: 2019-03-21 | End: 2019-03-21 | Stop reason: HOSPADM

## 2019-03-21 RX ORDER — EPINEPHRINE 0.1 MG/ML
1 INJECTION INTRACARDIAC; INTRAVENOUS
Status: DISCONTINUED | OUTPATIENT
Start: 2019-03-21 | End: 2019-03-21 | Stop reason: HOSPADM

## 2019-03-21 RX ORDER — SODIUM CHLORIDE 0.9 % (FLUSH) 0.9 %
5-40 SYRINGE (ML) INJECTION AS NEEDED
Status: DISCONTINUED | OUTPATIENT
Start: 2019-03-21 | End: 2019-03-21 | Stop reason: HOSPADM

## 2019-03-21 RX ORDER — URSODIOL 500 MG/1
1500 TABLET, FILM COATED ORAL DAILY
Qty: 270 TAB | Refills: 4 | Status: SHIPPED | OUTPATIENT
Start: 2019-03-21 | End: 2019-05-28 | Stop reason: SDUPTHER

## 2019-03-21 RX ORDER — NALOXONE HYDROCHLORIDE 0.4 MG/ML
0.4 INJECTION, SOLUTION INTRAMUSCULAR; INTRAVENOUS; SUBCUTANEOUS
Status: DISCONTINUED | OUTPATIENT
Start: 2019-03-21 | End: 2019-03-21 | Stop reason: HOSPADM

## 2019-03-21 RX ADMIN — PROPOFOL 100 MG: 10 INJECTION, EMULSION INTRAVENOUS at 13:34

## 2019-03-21 RX ADMIN — PROPOFOL 50 MG: 10 INJECTION, EMULSION INTRAVENOUS at 13:38

## 2019-03-21 RX ADMIN — PROPOFOL 50 MG: 10 INJECTION, EMULSION INTRAVENOUS at 13:36

## 2019-03-21 RX ADMIN — SODIUM CHLORIDE: 9 INJECTION, SOLUTION INTRAVENOUS at 13:30

## 2019-03-21 RX ADMIN — LIDOCAINE HYDROCHLORIDE 40 MG: 20 INJECTION, SOLUTION EPIDURAL; INFILTRATION; INTRACAUDAL; PERINEURAL at 13:33

## 2019-03-21 NOTE — H&P
3340 [facility], MD, [address]       ABHISHEK Smart PA-C Marylin Arm, [facility]   ABHISHEK Ramon NP [address] [facility] 136    at [address]    217 [facility], [address] Mal Barajas  22.    [phone]    FAX: [address] [place] [address] Avenue    [address], [address] [facility], [address]    [phone]    FAX: [phone]         PRE-PROCEDURE NOTE - EGD    H and P from last office visit reviewed. Allergies reviewed. Out-patient medicaton list reviewed. Patient Active Problem List   Diagnosis Code    Primary biliary cholangitis (HCC) K74.3    Cirrhosis ([facility] [address] K74.60    Thrombocytopenia (HCC) D69.6    Ascites R18.8       Allergies   Allergen Reactions    Latex Hives       No current facility-administered medications on file prior to encounter. Current Outpatient Medications on File Prior to Encounter   Medication Sig Dispense Refill    ursodiol (ACTIGALL) 500 mg tablet Take 500 mg by mouth three (3) times daily (with meals).  furosemide (LASIX) 40 mg tablet Take  by mouth daily.  spironolactone (ALDACTONE) 100 mg tablet Take  by mouth daily. For EGD to assess for esophageal and gastric varices. Plan to perform banding if indicated based upon variceal size and appearance. The risks of the procedure were discussed with the patient. These included reaction to anesthesia, pain, perforation and bleeding. All questions were answered. The patient wishes to proceed with the procedure. PHYSICAL EXAMINATION:  VS: per nursing note  General: No acute distress. Eyes: Sclera anicteric. ENT: No oral lesions. Thyroid normal.  Nodes: No adenopathy. Skin: No spider angiomata. No jaundice.   No palmar erythema. Respiratory: Lungs clear to auscultation. Cardiovascular: Regular heart rate. No murmurs. No JVD. Abdomen: Soft non-tender, liver size normal to percussion/palpation. Spleen not palpable. No obvious ascites. Extremities: No edema. No muscle wasting. No gross arthritic changes. Neurologic: Alert and oriented. Cranial nerves grossly intact. No asterixis. MOST RECENT LABORATORY STUDIES:  Lab Results   Component Value Date/Time    WBC 3.9 01/14/2019 12:00 AM    HGB 13.5 01/14/2019 12:00 AM    HCT 39.2 01/14/2019 12:00 AM    PLATELET 519 (L) 76/28/4688 12:00 AM    MCV 98 (H) 01/14/2019 12:00 AM     Lab Results   Component Value Date/Time    INR 1.1 01/14/2019 12:00 AM    INR 1.1 11/14/2018 12:00 AM    INR 1.2 10/12/2018 12:00 AM    Prothrombin time 11.8 01/14/2019 12:00 AM    Prothrombin time 11.7 11/14/2018 12:00 AM    Prothrombin time 11.9 10/12/2018 12:00 AM       ASSESSMENT AND PLAN:  EGD to assess for esophageal and/or gastric varices. Conscious sedation with fentanyl and versed.     Sherine Brian MD  [facility] 13  [address], [address] [place] [address] 22.  [phone]  [address] [place] [address]

## 2019-03-21 NOTE — PROCEDURES
3340 [facility], MD, [address], [place], [place]       ABHISHEK Valles PA-C Everlean Rape, DAJAP-ABHISHEK Lopez NP [address] Pending sale to [facility] 136    at [address], [address] Mal Barajas  22.    [phone]    FAX: [address] [place] [address], [address] [place] [address] - [address]    [phone]    FAX: [phone]         UPPER ENDOSCOPY PROCEDURE NOTE    Blanca Faster  1945    INDICATION: Cirrhosis. Screening for esophageal varices with variceal ligation if  indicated. : Priyanka Nelson MD    ANESTHESIA/SEDATION: Propofol was administered by anesthesia    PROCEDURE DESCRIPTION:  Infomed consent was obtained from the patient for the procedure. All risks and benefits of the procedure explained. The patient was taken to the endoscopy suite and placed in the left lateral decubitus position. Following sequential administration of sedation to doses as indicated above the endoscope was inserted into the mouth and advanced under direct vision to the second portion of the duodenum. Careful inspection of upper gastrointestinal tract was made as the endoscope was inserted and withdrawn. Retroflexion of the endoscope to view of the cardia of the stomach was performed. The findings and interventions are described below. FINDINGS:  Esophagus:    Small esophageal varices. No banding performed. Stomach:   Mild portal hypertensive gastropathy of the body of the stomach  Gastritis of the antrum  No gastric varices identified. Duodenum:   Normal bulb and second portion    INTERVENTION:   2 biopsies were taken from the antrum.   The specimens were placed in preservative and transported to Pathology after the procedure. COMPLICATIONS: None. The patient tolerated the procedure well. EBL: Negligible. RECOMMENDATIONS:  Observe until discharge parameters are achieved. May resume previous diet. Repeat endoscopy to reassess varices and need for banding in   1 year. Follow-up Liver [place] [facility] office as scheduled.       Nathalie Rodarte MD  [address] [place] [address] 22.  [phone]  [address] [place] [address]

## 2019-03-21 NOTE — ROUTINE PROCESS
Charlotte Newton  1945  [id number]    Situation:  Verbal report received from: JOSE Vasques, RN  Procedure: Procedure(s):  ESOPHAGOGASTRODUODENOSCOPY (EGD)  ESOPHAGOGASTRODUODENAL (EGD) BIOPSY    Background:    Preoperative diagnosis: PRIMARY BILIARY CHOLANGITIS  Postoperative diagnosis: Gastritis  Small Varices    :  Dr. Maikol Ellington  Assistant(s): Endoscopy Technician-1: Balta Mendez  Endoscopy RN-1: Mike Beltre RN    Specimens:   ID Type Source Tests Collected by Time Destination   1 : gastric Preservative Gastric  Jalyn Penn MD 3/21/2019 1334 Pathology     H. Pylori  no    Assessment:  Intra-procedure medications      Anesthesia gave intra-procedure sedation and medications, see anesthesia flow sheet yes    Intravenous fluids: NS@ KVO     Vital signs stable     Abdominal assessment: round and soft     Recommendation:  Discharge patient per MD order.     Family or Friend   Permission to share finding with family or friend yes

## 2019-03-21 NOTE — ANESTHESIA POSTPROCEDURE EVALUATION
Procedure(s): ESOPHAGOGASTRODUODENOSCOPY (EGD) ESOPHAGOGASTRODUODENAL (EGD) BIOPSY. MAC 
 
<BSHSIANPOST> Vitals Value Taken Time /66 3/21/2019  1:48 PM  
Temp . 3/21/2019  1:52 PM  
Pulse 74 3/21/2019  1:50 PM  
Resp 21 3/21/2019  1:50 PM  
SpO2 95 % 3/21/2019  1:50 PM  
Vitals shown include unvalidated device data.

## 2019-03-21 NOTE — DISCHARGE INSTRUCTIONS
3340 [facility], MD, [address], [facility] [place]       ABHISHEK Aleman PA-C Monroe Romney, [facility]-BC   ABHISHEK Anna NP [address] [facility] 136    at [address], [address] Mal Barajas  22.    [phone]    FAX: [address] [place] [address], [address]    [phone]    FAX: [phone]         ENDOSCOPY DISCHARGE INSTRUCTIONS      Kishan Pedro  1945  Date: 3/21/2019    DISCOMFORT:  Use lozenges or warm salt water gargle for sore thoat  Apply warm compress to IV site if red. If redness or soreness persists call the office. You may experience gas and bloating. Walking and belching will help relieve this. You may experience chest discomfort or pressure especially if banding of esophageal varices was performed. DIET:  You may resume your normal diet. ACTIVITY:  Spend the remainder of the day resting. Avoid any strenuous activity. You may not operate a vehicle for 12 hours. You may not engage in an occupation involving machinery or appliances for rest of today. Avoid making any critical or financial decisions for at least 24 hour. Call the [address] [address] [facility] [facility] if you have any of the following:  Increasing chest or abdominal pain, nausea, vomiting, vomiting blood, abdominal distension or swelling, fever or chills, bloody discharge from nose or mouth or shortness of breath. Follow-up Instructions:  Call Dr. Beth Velásquez for any questions or problems at the phone number listed above. If a biopsy was performed, you will be contacted by the office staff or Dr Beth Velásquez within 1 week.    If you have not heard from us by then you may call the office at the phone number listed above to inquire about the results. ENDOSCOPY FINDINGS:  Small diataed veins in the food tube  Your endoscopy demonstrated mild swelling of the stoamch. A biopsy of the stomach was taken. DISCHARGE SUMMARY from the Nurse: The following personal items collected during your admission are returned to you:   Dental Appliance: Dental Appliances:  At bedside  Vision:    Hearing Aid:    Jewelry:    Clothing:    Other Valuables:    Valuables sent to safe:

## 2019-03-21 NOTE — ANESTHESIA PREPROCEDURE EVALUATION
Relevant Problems No relevant active problems Anesthetic History No history of anesthetic complications Review of Systems / Medical History Patient summary reviewed, nursing notes reviewed and pertinent labs reviewed Pulmonary Within defined limits Neuro/Psych Within defined limits Cardiovascular Within defined limits GI/Hepatic/Renal 
Within defined limits Liver disease Endo/Other Within defined limits Other Findings Physical Exam 
 
Airway Mallampati: II 
TM Distance: > 6 cm Neck ROM: normal range of motion Mouth opening: Normal 
 
 Cardiovascular Regular rate and rhythm,  S1 and S2 normal,  no murmur, click, rub, or gallop Dental 
No notable dental hx Pulmonary Breath sounds clear to auscultation Abdominal 
GI exam deferred Other Findings Anesthetic Plan ASA: 3 Anesthesia type: MAC Anesthetic plan and risks discussed with: Patient

## 2019-03-21 NOTE — PERIOP NOTES

## 2019-05-28 ENCOUNTER — OFFICE VISIT (OUTPATIENT)
Dept: HEMATOLOGY | Age: 74
End: 2019-05-28

## 2019-05-28 VITALS
HEART RATE: 72 BPM | HEIGHT: 62 IN | WEIGHT: 186.4 LBS | DIASTOLIC BLOOD PRESSURE: 61 MMHG | TEMPERATURE: 98.4 F | SYSTOLIC BLOOD PRESSURE: 113 MMHG | OXYGEN SATURATION: 97 % | BODY MASS INDEX: 34.3 KG/M2

## 2019-05-28 DIAGNOSIS — K74.3 PRIMARY BILIARY CHOLANGITIS (HCC): Primary | ICD-10-CM

## 2019-05-28 NOTE — PROGRESS NOTES
1. Have you been to the ER, urgent care clinic since your last visit? Hospitalized since your last visit? No    2. Have you seen or consulted any other health care providers outside of the [address] [place] [address] since your last visit? Include any pap smears or colon screening. No     Chief Complaint   Patient presents with    Follow-up     Visit Vitals  /61   Pulse 72   Temp 98.4 °F (36.9 °C) (Tympanic)   Ht 5' 2\" (1.575 m)   Wt 186 lb 6.4 oz (84.6 kg)   SpO2 97%   BMI 34.09 kg/m²     3 most recent PHQ Screens 5/28/2019   Little interest or pleasure in doing things Not at all   Feeling down, depressed, irritable, or hopeless Not at all   Total Score PHQ 2 0     Fall Risk Assessment, last 12 mths 5/28/2019   Able to walk? Yes   Fall in past 12 months? Yes   Fall with injury? No   Number of falls in past 12 months 2   Fall Risk Score 2     Learning Assessment 5/28/2019   PRIMARY LEARNER Patient   BARRIERS PRIMARY LEARNER NONE   CO-LEARNER CAREGIVER No   PRIMARY LANGUAGE ENGLISH   LEARNER PREFERENCE PRIMARY LISTENING   ANSWERED BY patient   RELATIONSHIP SELF     Abuse Screening Questionnaire 5/28/2019   Do you ever feel afraid of your partner? N   Are you in a relationship with someone who physically or mentally threatens you? N   Is it safe for you to go home?  Mayelin Hernandez

## 2019-05-28 NOTE — Clinical Note
7/13/19 Patient: Dee Dee Quintero YOB: 1945 Date of Visit: 5/28/2019 Glenna Sidhu MD 
[address] [place] [address] VIA Facsimile: [phone] Dear Glenna Sidhu MD, Thank you for referring Ms. Dee Dee Quintero to [facility] Maday Rodríguez for evaluation. My notes for this consultation are attached. If you have questions, please do not hesitate to call me. I look forward to following your patient along with you. Sincerely, Margareth Echeverria MD

## 2019-05-28 NOTE — PROGRESS NOTES
[facility], Nelle Daring, MD Baby Boon, PA-C Merry Carpen, [facility]-BC     Lori Carr, [facility]   ANGELES Willard, [facility]       [facility] [address] Esposito 136    at [address], [facility] Mal Barajas  22.    [phone]    FAX: [address] [place] [address], [address]    [phone]    FAX: [phone]       Patient Care Team:  Lashawn Mejia MD as PCP - General (Family Practice)      Problem List  Date Reviewed: 1/14/2019          Codes Class Noted    Primary biliary cholangitis [facility]) ICD-10-CM: K74.3  ICD-9-CM: 571.6  10/12/2018        Cirrhosis ([facility] [address] ICD-10-CM: K74.60  ICD-9-CM: 571.5  10/12/2018        Thrombocytopenia ([facility] [address] ICD-10-CM: D69.6  ICD-9-CM: 287.5  10/12/2018        Ascites ICD-10-CM: R18.8  ICD-9-CM: 789.59  10/12/2018              Hong Salazar returns to the [address] for management of primary biliary cholangitis. The active problem list, all pertinent past medical history, medications, endoscopic studies, radiologic findings and laboratory findings related to the liver disorder were reviewed with the patient. The patient is a 68 y.o.  female who was found to have chronic liver disease, cirrhosis and PBC in 2017 when she developed ascites. An assessment of liver fibrosis with biopsy or elastography has not been performed. Serologic evaluation for markers of chronic liver disease was positive for AMA. The patient has developed the following complications of cirrhosis: ascites and lower extremity edema. The patient notes itching.      The patient has not experienced pain in the right side over the liver, dry eyes, dry mouth or dark urine. The patient completes all daily activities without any functional limitations. 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 The diagnosis of cirrhosis is based upon laboratory studies, imaging and complications of cirrhosis. The CTP is 6. Child class A. The MELD score is 10. Primary biliary cholangitis  The diagnosis is based upon serology and an elevation in ALP. Serologic testing for causes of chronic liver disease were ordered. These were positive for ASMA and AMA. A liver biopsy has not been performed. The patient is being treated with JOLIE 1500 mg every day   The patient is tolerating the treatment well without any side effects. Have performed laboratory testing to monitor liver function and degree of liver injury. This included BMP, hepatic panel, CBC with platelet count and INR. Liver transaminases are normal.  ALP is elevated. Total bilirubin is mildly elevated. Serum albumin is depressed. INR is normal.    The platelet count is depressed. The patient is not responding to treatment. The ALP has not come down to near normal values. Will add Obeticholic acid 5 mg every week. The side effects of OCA including a 50% risk of itching were discussed. Ascites   Has resolved with current dose of diuretics. Will continue the current dose of diuretics at step 1. Lower extremity edema  This has resolved with current dose of diuretics. Screening for esophageal varices   The patient has small esophageal varices without prior bleeding. The last EGD to assess for varices was performed in 3/2017. EGD to assess for varices and need for banding is scheduled for 3/2019     Hepatic encephalopathy   This has not developed to date. There is no need for treatment with lactulose and/or Xifaxan at this time. No need to restrict dietary protein at this time.       Itching   This is secondary to the underlying liver disease. It is mild and the patient does not feel like she needs any medical treatment for this at this time. Essential lavender oil is helping her with this symptom. Screening for hepatocellular carcinoma  HCC screening has recently been performed and does not suggest [address] The next liver imaging study will be performed in 12/2019. Treatment of other medical problems in patients with chronic liver disease  There are no contraindications for the patient to take any medications that are necessary for treatment of other medical issues. The patient does not consume alcohol daily. Normal doses of acetaminophen can be used for pain as needed. Normal doses of acetaminophen as recommended on the label are not hepatotoxic, even in patients with cirrhosis. The patient has cirrhosis. Patients with cirrhosis should not use NSAIDs if possible as this is associated with a higher rate of ILEANA. Counseling for alcohol in patients with chronic liver disease  The patient does not consume any significant amount of alcohol. Thrombocytopenia   This is secondary to cirrhosis. There is no evidence of overt bleeding. No treatment is required. The platelet count is adequate for the patient to undergo procedures without the need for platelet transfusion or platelet growth factors. Osteoporosis  The risk of osteoporosis is increased in patients with PBC and cirrhosis. DEXA bone density to assess for osteoporosis has not been performed. This should be ordered by the patient's primary care physician. Vaccinations   Routine vaccinations against other bacterial and viral agents can be performed as indicated. Annual flu vaccination should be administered if indicated. ALLERGIES  Allergies   Allergen Reactions    Latex Hives     MEDICATIONS  Current Outpatient Medications   Medication Sig    ursodiol (ACTIGALL) 500 mg tablet Take 500 mg by mouth three (3) times daily (with meals).     furosemide (LASIX) 40 mg tablet Take  by mouth daily.  spironolactone (ALDACTONE) 100 mg tablet Take  by mouth daily. No current facility-administered medications for this visit. SYSTEM REVIEW NOT RELATED TO LIVER DISEASE OR REVIEWED ABOVE:  Constitution systems: Negative for fever, chills, weight gain, weight loss. Eyes: Negative for visual changes. ENT: Negative for sore throat, painful swallowing. Respiratory: Negative for cough, hemoptysis, SOB. Cardiology: Negative for chest pain, palpitations. GI:  Negative for constipation or diarrhea. : Negative for urinary frequency, dysuria, hematuria, nocturia. Skin: Negative for rash. Hematology: Negative for easy bruising, blood clots. Musculo-skeletal: Negative for back pain, muscle pain, weakness. Neurologic: Negative for headaches, dizziness, vertigo, memory problems not related to HE. Psychology: Negative for anxiety, depression. FAMILY HISTORY:  The patient is adopted and does not know any of her family history. SOCIAL HISTORY:  The patient is . The patient has 3 children and 15 grandchildren. The patient has never used tobacco products. The patient has never consumed significant amounts of alcohol. The patient used to work as . PHYSICAL EXAMINATION:  Visit Vitals  /61   Pulse 72   Temp 98.4 °F (36.9 °C) (Tympanic)   Ht 5' 2\" (1.575 m)   Wt 186 lb 6.4 oz (84.6 kg)   SpO2 97%   BMI 34.09 kg/m²     General: No acute distress. Eyes: Sclera anicteric. ENT: No oral lesions. Nodes: No adenopathy. Skin: No spider angiomata. No jaundice. No palmar erythema. Respiratory: Lungs clear to auscultation. Cardiovascular: Regular heart rate. No murmurs. No JVD. Abdomen: Soft non-tender. Liver size normal to percussion/palpation. Spleen not palpable. No obvious ascites. Extremities: No edema. No muscle wasting. No gross arthritic changes. Neurologic: Alert and oriented. Cranial nerves grossly intact.  No stanleyixis. LABORATORY STUDIES:  Liver [place] of [address] [place] [address] Units 11/14/2018 10/12/2018   WBC 3.4 - 10.8 x10E3/uL 4.6 4.6   ANC 1.4 - 7.0 x10E3/uL  2.9   HGB 11.1 - 15.9 g/dL 14.3 13.8    - 379 x10E3/uL 135 (L) 130 (L)   INR 0.8 - 1.2 1.1 1.2   AST 0 - 40 IU/L 23 23   ALT 0 - 32 IU/L 10 11   Alk Phos 39 - 117 IU/L 208 (H) 222 (H)   Bili, Total 0.0 - 1.2 mg/dL 1.7 (H) 1.9 (H)   Bili, Direct 0.00 - 0.40 mg/dL 0.90 (H) 0.82 (H)   Albumin 3.5 - 4.8 g/dL 3.2 (L) 3.0 (L)   BUN 8 - 27 mg/dL 16 11   Creat 0.57 - 1.00 mg/dL 0.92 0.92   Na 134 - 144 mmol/L 140 138   K 3.5 - 5.2 mmol/L 4.5 4.1   Cl 96 - 106 mmol/L 102 105   CO2 20 - 29 mmol/L 25 21   Glucose 65 - 99 mg/dL 107 (H) 101 (H)     SEROLOGIES:  8/2018. HBsurface antigen negative, anti-HBcore total negative, anti-HBsurface negative, anti-HCV negative, NEYMAR negative    Serologies Latest Ref Rng & Units 10/12/2018   NEYMAR, IFA  Negative   ASMCA 0 - 19 Units 73 (H)   M2 Ab 0.0 - 20.0 Units 25.2 (H)     LIVER HISTOLOGY:  Not available or performed    ENDOSCOPIC PROCEDURES:  3/2017. Colonoscopy by Dr Randall Connor. No polyps. 3/2017. EGD by Dr Randall Connor. Grade 1 esophageal varices. Mild portal gastropathy. RADIOLOGY:  10/2018. Ultrasound of liver. Echogenic consistent with cirrhosis. No liver mass lesions. No dilated bile ducts. No ascites. 6/2019. Ultrasound of liver. Echogenic consistent with cirrhosis. No liver mass lesions. No dilated bile ducts. No ascites. OTHER TESTING:  Not available or performed    FOLLOW-UP:  All of the issues listed above in the Assessment and Plan were discussed with the patient. All questions were answered. The patient expressed a clear understanding of the above. [address] [address] in 2 months To assess the effects and tolerability of OCA.     MD Gorge Cook[address] Donald Aguilar [address] [place] [address] 22.  [phone]  BON Preston Bee

## 2019-05-29 LAB
ALBUMIN SERPL-MCNC: 3.1 G/DL (ref 3.5–4.8)
ALP SERPL-CCNC: 251 IU/L (ref 39–117)
ALT SERPL-CCNC: 10 IU/L (ref 0–32)
AST SERPL-CCNC: 20 IU/L (ref 0–40)
BASOPHILS # BLD AUTO: 0 X10E3/UL (ref 0–0.2)
BASOPHILS NFR BLD AUTO: 1 %
BILIRUB DIRECT SERPL-MCNC: 0.56 MG/DL (ref 0–0.4)
BILIRUB SERPL-MCNC: 1.3 MG/DL (ref 0–1.2)
BUN SERPL-MCNC: 12 MG/DL (ref 8–27)
BUN/CREAT SERPL: 14 (ref 12–28)
CALCIUM SERPL-MCNC: 8.5 MG/DL (ref 8.7–10.3)
CHLORIDE SERPL-SCNC: 103 MMOL/L (ref 96–106)
CO2 SERPL-SCNC: 24 MMOL/L (ref 20–29)
CREAT SERPL-MCNC: 0.83 MG/DL (ref 0.57–1)
EOSINOPHIL # BLD AUTO: 0.1 X10E3/UL (ref 0–0.4)
EOSINOPHIL NFR BLD AUTO: 4 %
ERYTHROCYTE [DISTWIDTH] IN BLOOD BY AUTOMATED COUNT: 14.3 % (ref 12.3–15.4)
GLUCOSE SERPL-MCNC: 117 MG/DL (ref 65–99)
HCT VFR BLD AUTO: 38.5 % (ref 34–46.6)
HGB BLD-MCNC: 13.2 G/DL (ref 11.1–15.9)
IMM GRANULOCYTES # BLD AUTO: 0 X10E3/UL (ref 0–0.1)
IMM GRANULOCYTES NFR BLD AUTO: 0 %
INR PPP: 1.1 (ref 0.8–1.2)
LYMPHOCYTES # BLD AUTO: 0.9 X10E3/UL (ref 0.7–3.1)
LYMPHOCYTES NFR BLD AUTO: 24 %
MCH RBC QN AUTO: 33.4 PG (ref 26.6–33)
MCHC RBC AUTO-ENTMCNC: 34.3 G/DL (ref 31.5–35.7)
MCV RBC AUTO: 98 FL (ref 79–97)
MONOCYTES # BLD AUTO: 0.6 X10E3/UL (ref 0.1–0.9)
MONOCYTES NFR BLD AUTO: 16 %
NEUTROPHILS # BLD AUTO: 2 X10E3/UL (ref 1.4–7)
NEUTROPHILS NFR BLD AUTO: 55 %
PLATELET # BLD AUTO: 118 X10E3/UL (ref 150–450)
POTASSIUM SERPL-SCNC: 3.9 MMOL/L (ref 3.5–5.2)
PROT SERPL-MCNC: 7.4 G/DL (ref 6–8.5)
PROTHROMBIN TIME: 11.7 SEC (ref 9.1–12)
RBC # BLD AUTO: 3.95 X10E6/UL (ref 3.77–5.28)
SODIUM SERPL-SCNC: 139 MMOL/L (ref 134–144)
WBC # BLD AUTO: 3.6 X10E3/UL (ref 3.4–10.8)

## 2019-05-30 LAB
AFP L3 MFR SERPL: NORMAL % (ref 0–9.9)
AFP SERPL-MCNC: 3.9 NG/ML (ref 0–8)

## 2019-06-11 ENCOUNTER — TELEPHONE (OUTPATIENT)
Dept: HEMATOLOGY | Age: 74
End: 2019-06-11

## 2019-06-11 NOTE — TELEPHONE ENCOUNTER
Patient contacted office regarding trouble obtaining [place] prescription. Patient states the [address] [facility] pharmacy contacted her and advised her prior authorization was approved, but the prescription had to be transferred to [address] [place] [address] for dispensing. Patient states she spoke to [address] [place] [address] and was told they have been trying to contact the office regarding the prescription. Patient asked for staff to contact [facility] and determine what is needed.

## 2019-06-14 ENCOUNTER — HOSPITAL ENCOUNTER (EMERGENCY)
Dept: GENERAL RADIOLOGY | Age: 74
Discharge: HOME | End: 2019-06-14
Attending: EMERGENCY MEDICINE
Payer: MEDICARE

## 2019-06-14 ENCOUNTER — HOSPITAL ENCOUNTER (EMERGENCY)
Age: 74
Discharge: HOME | End: 2019-06-14
Attending: EMERGENCY MEDICINE
Payer: MEDICARE

## 2019-06-14 ENCOUNTER — HOSPITAL ENCOUNTER (OUTPATIENT)
Dept: ULTRASOUND IMAGING | Age: 74
Discharge: HOME | End: 2019-06-14
Attending: INTERNAL MEDICINE
Payer: MEDICARE

## 2019-06-14 VITALS
RESPIRATION RATE: 16 BRPM | OXYGEN SATURATION: 97 % | SYSTOLIC BLOOD PRESSURE: 123 MMHG | HEART RATE: 76 BPM | BODY MASS INDEX: 34.23 KG/M2 | HEIGHT: 62 IN | WEIGHT: 186 LBS | DIASTOLIC BLOOD PRESSURE: 61 MMHG

## 2019-06-14 DIAGNOSIS — K74.3 PRIMARY BILIARY CHOLANGITIS (HCC): ICD-10-CM

## 2019-06-14 DIAGNOSIS — S89.91XA INJURY OF RIGHT KNEE, INITIAL ENCOUNTER: Primary | ICD-10-CM

## 2019-06-14 DIAGNOSIS — S49.92XA INJURY OF LEFT SHOULDER, INITIAL ENCOUNTER: ICD-10-CM

## 2019-06-14 PROCEDURE — 73562 X-RAY EXAM OF KNEE 3: CPT

## 2019-06-14 PROCEDURE — 76705 ECHO EXAM OF ABDOMEN: CPT

## 2019-06-14 PROCEDURE — 99282 EMERGENCY DEPT VISIT SF MDM: CPT

## 2019-06-14 PROCEDURE — A4565 SLINGS: HCPCS

## 2019-06-14 PROCEDURE — 73030 X-RAY EXAM OF SHOULDER: CPT

## 2019-06-14 NOTE — ED PROVIDER NOTES
10:43 AM    68 y.o. female with past medical history significant for liver cirrhosis who presents from home via personal vehicle with chief complaint of right knee and left shoulder pain. Pt states 2 days ago she was turning around to pick something up and her right knee gave out. Pt states all of her pain is centered in her knee and denies radiating pain in her leg. Pt states she has been unable to ambulate well since then. Pt denies her knee being red, warm or swollen. Pt states she has only used cream to alleviate pain with no success. Pt states 2 days ago in the evening, she was attempting to use the bathroom on her injured knee and fell in the bathroom, hitting her left shoulder on the wall and injuring her shoulder. Pt does not know if she hit her head or not but denies LOC. Pt denies her shoulder being red, warm or swollen. Pt states she is having trouble using her walker because of her shoulder injury. Pt denies headache or dizziness, fever, cough, vomiting, appetite, and diarrhea. There are no other acute medical concerns at this time. Social hx: No tobacco use, No EtOH use    PCP: Dmitriy Gimenez MD    Note written by George Higuera, as dictated by Ronni Meadows MD 10:43 AM    The history is provided by the patient. No  was used. Past Medical History:   Diagnosis Date    Liver cirrhosis [facility])        Past Surgical History:   Procedure Laterality Date    HX GYN      c sections         No family history on file.     Social History     Socioeconomic History    Marital status:      Spouse name: Not on file    Number of children: Not on file    Years of education: Not on file    Highest education level: Not on file   Occupational History    Not on file   Social Needs    Financial resource strain: Not on file    Food insecurity:     Worry: Not on file     Inability: Not on file    Transportation needs:     Medical: Not on file     Non-medical: Not on file   Tobacco Use    Smoking status: Never Smoker    Smokeless tobacco: Never Used   Substance and Sexual Activity    Alcohol use: No    Drug use: No    Sexual activity: Not on file   Lifestyle    Physical activity:     Days per week: Not on file     Minutes per session: Not on file    Stress: Not on file   Relationships    Social connections:     Talks on phone: Not on file     Gets together: Not on file     Attends Gnosticist service: Not on file     Active member of club or organization: Not on file     Attends meetings of clubs or organizations: Not on file     Relationship status: Not on file    Intimate partner violence:     Fear of current or ex partner: Not on file     Emotionally abused: Not on file     Physically abused: Not on file     Forced sexual activity: Not on file   Other Topics Concern    Not on file   Social History Narrative    Not on file         ALLERGIES: Latex    Review of Systems   Constitutional: Negative for appetite change and fever. Respiratory: Negative for cough. Gastrointestinal: Negative for diarrhea and vomiting. Musculoskeletal: Positive for arthralgias. Neurological: Negative for dizziness. All other systems reviewed and are negative. There were no vitals filed for this visit. Physical Exam   Constitutional: She appears well-developed and well-nourished. HENT:   Head: Normocephalic and atraumatic. Eyes: Conjunctivae are normal. No scleral icterus. Neck: No JVD present. No tracheal deviation present. Cardiovascular: Normal rate. Pt has 2+ radial and 2+ pedal pulses. Pulmonary/Chest: Effort normal.   Abdominal: She exhibits no distension. Musculoskeletal: She exhibits no edema. Right shoulder: She exhibits decreased range of motion and tenderness. She exhibits no swelling. Pt is tender laterally and inferiorly along the right knee. Pt has slighthly reduced ROM. Pt has no swelling or redness.  Pt is tender along the left proximal humerous, with no swelling or bruising noted. Neurological: She is alert. oriented   Skin: No rash noted. No pallor. Psychiatric: She has a normal mood and affect. Nursing note and vitals reviewed. Note written by Ivana Workman. Derrell De León, as dictated by Justen Mcdonough MD 10:50 AM    MDM       Procedures    Progress Note:  Results, treatment, and follow up plan have been discussed with patient/daughter. Questions were answered. Mai Galeazzi, MD    A/P: fall with right knee and left shoulder pain - suspect sprains; reassuring appearance and exam; VSS; case management has seen and will arrange home health, PT f/u; rest, ice, sling, ace, ortho f/u.   Mai Galeazzi, MD

## 2019-06-14 NOTE — DISCHARGE INSTRUCTIONS
Patient Education   Patient Education        Shoulder Pain: Care Instructions  Your Care Instructions    You can hurt your shoulder by using it too much during an activity, such as fishing or baseball. It can also happen as part of the everyday wear and tear of getting older. Shoulder injuries can be slow to heal, but your shoulder should get better with time. Your doctor may recommend a sling to rest your shoulder. If you have injured your shoulder, you may need testing and treatment. Follow-up care is a key part of your treatment and safety. Be sure to make and go to all appointments, and call your doctor if you are having problems. It's also a good idea to know your test results and keep a list of the medicines you take. How can you care for yourself at home? · Take pain medicines exactly as directed. ? If the doctor gave you a prescription medicine for pain, take it as prescribed. ? If you are not taking a prescription pain medicine, ask your doctor if you can take an over-the-counter medicine. ? Do not take two or more pain medicines at the same time unless the doctor told you to. Many pain medicines contain acetaminophen, which is Tylenol. Too much acetaminophen (Tylenol) can be harmful. · If your doctor recommends that you wear a sling, use it as directed. Do not take it off before your doctor tells you to. · Put ice or a cold pack on the sore area for 10 to 20 minutes at a time. Put a thin cloth between the ice and your skin. · If there is no swelling, you can put moist heat, a heating pad, or a warm cloth on your shoulder. Some doctors suggest alternating between hot and cold. · Rest your shoulder for a few days. If your doctor recommends it, you can then begin gentle exercise of the shoulder, but do not lift anything heavy. When should you call for help? Call 911 anytime you think you may need emergency care. For example, call if:    · You have chest pain or pressure.  This may occur with:  ? Sweating. ? Shortness of breath. ? Nausea or vomiting. ? Pain that spreads from the chest to the neck, jaw, or one or both shoulders or arms. ? Dizziness or lightheadedness. ? A fast or uneven pulse. After calling 911, chew 1 adult-strength aspirin. Wait for an ambulance. Do not try to drive yourself.     · Your arm or hand is cool or pale or changes color.    Call your doctor now or seek immediate medical care if:    · You have signs of infection, such as:  ? Increased pain, swelling, warmth, or redness in your shoulder. ? Red streaks leading from a place on your shoulder. ? Pus draining from an area of your shoulder. ? Swollen lymph nodes in your neck, armpits, or groin. ? A fever.    Watch closely for changes in your health, and be sure to contact your doctor if:    · You cannot use your shoulder.     · Your shoulder does not get better as expected. Where can you learn more? Go to http://nav-haresh.info/. Enter D624 in the search box to learn more about \"Shoulder Pain: Care Instructions. \"  Current as of: September 20, 2018  Content Version: 11.9  © 9411-4353 [employer]. Care instructions adapted under license by [employer] (which disclaims liability or warranty for this information). If you have questions about a medical condition or this instruction, always ask your healthcare professional. [place] [address] any warranty or liability for your use of this information. Knee Pain or Injury: Care Instructions  Your Care Instructions    Injuries are a common cause of knee problems. Sudden (acute) injuries may be caused by a direct blow to the knee. They can also be caused by abnormal twisting, bending, or falling on the knee. Pain, bruising, or swelling may be severe, and may start within minutes of the injury. Overuse is another cause of knee pain.  Other causes are climbing stairs, kneeling, and other activities that use the knee. Everyday wear and tear, especially as you get older, also can cause knee pain. Rest, along with home treatment, often relieves pain and allows your knee to heal. If you have a serious knee injury, you may need tests and treatment. Follow-up care is a key part of your treatment and safety. Be sure to make and go to all appointments, and call your doctor if you are having problems. It's also a good idea to know your test results and keep a list of the medicines you take. How can you care for yourself at home? · Be safe with medicines. Read and follow all instructions on the label. ? If the doctor gave you a prescription medicine for pain, take it as prescribed. ? If you are not taking a prescription pain medicine, ask your doctor if you can take an over-the-counter medicine. · Rest and protect your knee. Take a break from any activity that may cause pain. · Put ice or a cold pack on your knee for 10 to 20 minutes at a time. Put a thin cloth between the ice and your skin. · Prop up a sore knee on a pillow when you ice it or anytime you sit or lie down for the next 3 days. Try to keep it above the level of your heart. This will help reduce swelling. · If your knee is not swollen, you can put moist heat, a heating pad, or a warm cloth on your knee. · If your doctor recommends an elastic bandage, sleeve, or other type of support for your knee, wear it as directed. · Follow your doctor's instructions about how much weight you can put on your leg. Use a cane, crutches, or a walker as instructed. · Follow your doctor's instructions about activity during your healing process. If you can do mild exercise, slowly increase your activity. · Reach and stay at a healthy weight. Extra weight can strain the joints, especially the knees and hips, and make the pain worse. Losing even a few pounds may help. When should you call for help? Call 911 anytime you think you may need emergency care.  For example, call if:    · You have symptoms of a blood clot in your lung (called a pulmonary embolism). These may include:  ? Sudden chest pain. ? Trouble breathing. ? Coughing up blood.    Call your doctor now or seek immediate medical care if:    · You have severe or increasing pain.     · Your leg or foot turns cold or changes color.     · You cannot stand or put weight on your knee.     · Your knee looks twisted or bent out of shape.     · You cannot move your knee.     · You have signs of infection, such as:  ? Increased pain, swelling, warmth, or redness. ? Red streaks leading from the knee. ? Pus draining from a place on your knee. ? A fever.     · You have signs of a blood clot in your leg (called a deep vein thrombosis), such as:  ? Pain in your calf, back of the knee, thigh, or groin. ? Redness and swelling in your leg or groin.    Watch closely for changes in your health, and be sure to contact your doctor if:    · You have tingling, weakness, or numbness in your knee.     · You have any new symptoms, such as swelling.     · You have bruises from a knee injury that last longer than 2 weeks.     · You do not get better as expected. Where can you learn more? Go to http://nav-haresh.info/. Enter K195 in the search box to learn more about \"Knee Pain or Injury: Care Instructions. \"  Current as of: September 23, 2018  Content Version: 11.9  © 0783-0147 [employer]. Care instructions adapted under license by [employer] (which disclaims liability or warranty for this information). If you have questions about a medical condition or this instruction, always ask your healthcare professional. [place] [address] any warranty or liability for your use of this information.

## 2019-06-14 NOTE — PROGRESS NOTES
6/14/2019  1:00 PM  Case management note    Received consult to set up [facility] for patient. Patient fell on Wednesday. Patient is having trouble ambulating safely due to patient on rt leg and rt shoulder. xrays did not reveal dani fracture.  was requested, spoke with patient and she gave choice to [facility]. [facility] Patient lives with daughter, and was independent prior to fall. No advance care planning.   Minus Hermelindo, [address]

## 2019-06-14 NOTE — ED TRIAGE NOTES
Pt states she had a fall on Wednesday when her right leg gave out. Pt injured her right knee and her left shoulder. No LOC.

## 2019-06-14 NOTE — ED NOTES
S/W CM, pt to have [facility] set up and patient will receive a call from [facility] agency today. Provider made aware.

## 2019-07-25 ENCOUNTER — OFFICE VISIT (OUTPATIENT)
Dept: HEMATOLOGY | Age: 74
End: 2019-07-25

## 2019-07-25 VITALS
HEIGHT: 62 IN | WEIGHT: 176.6 LBS | TEMPERATURE: 99.5 F | OXYGEN SATURATION: 96 % | DIASTOLIC BLOOD PRESSURE: 63 MMHG | BODY MASS INDEX: 32.5 KG/M2 | SYSTOLIC BLOOD PRESSURE: 135 MMHG | HEART RATE: 70 BPM

## 2019-07-25 DIAGNOSIS — K74.3 PRIMARY BILIARY CHOLANGITIS (HCC): Primary | ICD-10-CM

## 2019-07-25 NOTE — Clinical Note
8/26/19 Patient: Meliton Ballard YOB: 1945 Date of Visit: 7/25/2019 Castillo Leong MD 
[address] [id number] VIA Facsimile: [phone] Dear Castillo Leong MD, Thank you [facility] for [facility]. My notes for this consultation are attached. If you have questions, please do not hesitate to call me. I look forward to following your patient along with you. Sincerely, Margy Sosa MD

## 2019-07-25 NOTE — PROGRESS NOTES
[facility], MD, Arnav Comer MD      Kaelynesperanza Naidu, RICHARD Mccloud, [facility]LARISA Carr, [facility]   Rocky JorgelucieANGELES mata, [facility]       [facility] 136    at [facility], [address] [address]  22.    [phone]    FAX: [address] [place] [address], [address]    [phone]    FAX: [phone]       Patient Care Team:  Keyanna Rodriguez MD as PCP - General (Family Practice)      Problem List  Date Reviewed: 7/13/2019          Codes Class Noted    Primary biliary cholangitis [facility]) ICD-10-CM: K74.3  ICD-9-CM: 571.6  10/12/2018        Cirrhosis ([facility] ICD-10-CM: K74.60  ICD-9-CM: 571.5  10/12/2018        Thrombocytopenia ([facility] ICD-10-CM: D69.6  ICD-9-CM: 287.5  10/12/2018        Ascites ICD-10-CM: R18.8  ICD-9-CM: 789.59  10/12/2018              Hailee Benavides returns to the [address] for management of primary biliary cholangitis. The active problem list, all pertinent past medical history, medications, endoscopic studies, radiologic findings and laboratory findings related to the liver disorder were reviewed with the patient. The patient is a 76 y.o.  female who was found to have chronic liver disease, cirrhosis and PBC in 2017 when she developed ascites. An assessment of liver fibrosis with biopsy or elastography has not been performed. Serologic evaluation for markers of chronic liver disease was positive for AMA. Since the last appointment: the patient had a ground level fall and sustained a left humerus fracture.      The patient has developed the following complications of cirrhosis: ascites and lower extremity edema which are well controlled on step 1 diuretic therapy. The patient notes itching. The patient has not experienced pain in the right side over the liver, dry eyes, dry mouth or dark urine. The patient completes all daily activities without any functional limitations. 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 The diagnosis of cirrhosis is based upon laboratory studies, imaging and complications of cirrhosis. The CTP is 6. Child class A. The MELD score is 10. Primary biliary cholangitis  The diagnosis is based upon serology and an elevation in ALP. Serologic testing for causes of chronic liver disease were ordered. These were positive for ASMA and AMA. A liver biopsy has not been performed. The patient is being treated with JOLIE 1500 mg every day   The patient is tolerating the treatment well without any side effects. The patient has had persistent elevation in ALP despite being on JOLIE. She was as started on OCA in 6/2019 at a dose of 5 mg Qweek  She has tolerated the medication well with only mild nausea for 1-2 days after taking the medication. The side effects of OCA including a 50% risk of itching were discussed. If the ALP remains elevated at the next appointment 10/2019 will increase the dose of OCA to 10 mg weekly. Have performed laboratory testing to monitor liver function and degree of liver injury. This included BMP, hepatic panel, CBC with platelet count and INR. Liver transaminases are normal.  ALP is elevated. Total bilirubin is mildly elevated. Serum albumin is depressed. INR is normal.  The platelet count is depressed. Ascites   Has resolved with current dose of diuretics. Will continue the current dose of diuretics at step 1. Lower extremity edema  This has resolved with current dose of diuretics. Screening for esophageal varices   The patient has small esophageal varices without prior bleeding.     The last EGD to assess for varices was performed in 3/2019. EGD to assess for varices and need for banding will not need to be repeated until 3/2021    Hepatic encephalopathy   This has not developed to date. There is no need for treatment with lactulose and/or Xifaxan at this time. No need to restrict dietary protein at this time. Itching   This is secondary to the underlying liver disease. It is mild and the patient does not feel like she needs any medical treatment for this at this time. Essential lavender oil is helping her with this symptom. Screening for hepatocellular carcinoma  HCC screening has recently been performed and does not suggest [address] The next liver imaging study will be performed in 12/2019. Treatment of other medical problems in patients with chronic liver disease  There are no contraindications for the patient to take any medications that are necessary for treatment of other medical issues. The patient does not consume alcohol daily. Normal doses of acetaminophen can be used for pain as needed. Normal doses of acetaminophen as recommended on the label are not hepatotoxic, even in patients with cirrhosis. The patient has cirrhosis. Patients with cirrhosis should not use NSAIDs if possible as this is associated with a higher rate of ILEANA. Counseling for alcohol in patients with chronic liver disease  The patient does not consume any significant amount of alcohol. Thrombocytopenia   This is secondary to cirrhosis. There is no evidence of overt bleeding. No treatment is required. The platelet count is adequate for the patient to undergo procedures without the need for platelet transfusion or platelet growth factors. Osteoporosis  The risk of osteoporosis is increased in patients with PBC and cirrhosis. DEXA bone density to assess for osteoporosis has not been performed. This should be ordered by the patient's primary care physician.     Vaccinations   Routine vaccinations against other bacterial and viral agents can be performed as indicated. Annual flu vaccination should be administered if indicated. ALLERGIES  Allergies   Allergen Reactions    Latex Hives    Sulfa (Sulfonamide Antibiotics) Nausea and Vomiting     MEDICATIONS  Current Outpatient Medications   Medication Sig    obeticholic acid (OCALIVA) 5 mg tab Take 5 mg by mouth every seven (7) days.  ursodiol (ACTIGALL) 500 mg tablet Take 500 mg by mouth three (3) times daily (with meals).  furosemide (LASIX) 40 mg tablet Take  by mouth daily.  spironolactone (ALDACTONE) 100 mg tablet Take  by mouth daily. No current facility-administered medications for this visit. SYSTEM REVIEW NOT RELATED TO LIVER DISEASE OR REVIEWED ABOVE:  Constitution systems: Negative for fever, chills, weight gain, weight loss. Eyes: Negative for visual changes. ENT: Negative for sore throat, painful swallowing. Respiratory: Negative for cough, hemoptysis, SOB. Cardiology: Negative for chest pain, palpitations. GI:  Negative for constipation or diarrhea. : Negative for urinary frequency, dysuria, hematuria, nocturia. Skin: Negative for rash. Hematology: Negative for easy bruising, blood clots. Musculo-skeletal: Negative for back pain, muscle pain, weakness. Neurologic: Negative for headaches, dizziness, vertigo, memory problems not related to HE. Psychology: Negative for anxiety, depression. FAMILY HISTORY:  The patient is adopted and does not know any of her family history. SOCIAL HISTORY:  The patient is . The patient has 3 children and 15 grandchildren. The patient has never used tobacco products. The patient has never consumed significant amounts of alcohol. The patient used to work as .       PHYSICAL EXAMINATION:  Visit Vitals  /63 (BP 1 Location: Left arm, BP Patient Position: Sitting)   Pulse 70   Temp 99.5 °F (37.5 °C) (Tympanic)   Ht 5' 2\" (1.575 m)   Wt 176 lb 9.6 oz (80.1 kg)   SpO2 96%   BMI 32.30 kg/m²     General: No acute distress. Eyes: Sclera anicteric. ENT: No oral lesions. Nodes: No adenopathy. Skin: No spider angiomata. No jaundice. No palmar erythema. Respiratory: Lungs clear to auscultation. Cardiovascular: Regular heart rate. No murmurs. No JVD. Abdomen: Soft non-tender. Liver size normal to percussion/palpation. Spleen not palpable. No obvious ascites. Extremities: No edema. No muscle wasting. No gross arthritic changes. Neurologic: Alert and oriented. Cranial nerves grossly intact. No asterixis. LABORATORY STUDIES:  Liver [place] of [id number]  [address] Units 5/28/2019 1/14/2019   WBC 3.4 - 10.8 x10E3/uL 3.6 3.9   ANC 1.4 - 7.0 x10E3/uL 2.0    HGB 11.1 - 15.9 g/dL 13.2 13.5    - 450 x10E3/uL 118 (L) 116 (L)   INR 0.8 - 1.2 1.1 1.1   AST 0 - 40 IU/L 20 22   ALT 0 - 32 IU/L 10 11   Alk Phos 39 - 117 IU/L 251 (H) 223 (H)   Bili, Total 0.0 - 1.2 mg/dL 1.3 (H) 2.0 (H)   Bili, Direct 0.00 - 0.40 mg/dL 0.56 (H) 0.80 (H)   Albumin 3.5 - 4.8 g/dL 3.1 (L) 3.1 (L)   BUN 8 - 27 mg/dL 12 13   Creat 0.57 - 1.00 mg/dL 0.83 0.74   Na 134 - 144 mmol/L 139 140   K 3.5 - 5.2 mmol/L 3.9 4.1   Cl 96 - 106 mmol/L 103 104   CO2 20 - 29 mmol/L 24 25   Glucose 65 - 99 mg/dL 117 (H) 94     Liver [place] of [address] [place] [address] Ref Rng & Units 11/14/2018   WBC 3.4 - 10.8 x10E3/uL 4.6   ANC 1.4 - 7.0 x10E3/uL    HGB 11.1 - 15.9 g/dL 14.3    - 450 x10E3/uL 135 (L)   INR 0.8 - 1.2 1.1   AST 0 - 40 IU/L 23   ALT 0 - 32 IU/L 10   Alk Phos 39 - 117 IU/L 208 (H)   Bili, Total 0.0 - 1.2 mg/dL 1.7 (H)   Bili, Direct 0.00 - 0.40 mg/dL 0.90 (H)   Albumin 3.5 - 4.8 g/dL 3.2 (L)   BUN 8 - 27 mg/dL 16   Creat 0.57 - 1.00 mg/dL 0.92   Na 134 - 144 mmol/L 140   K 3.5 - 5.2 mmol/L 4.5   Cl 96 - 106 mmol/L 102   CO2 20 - 29 mmol/L 25   Glucose 65 - 99 mg/dL 107 (H)     SEROLOGIES:  8/2018.   HBsurface antigen negative, anti-HBcore total negative, anti-HBsurface negative, anti-HCV negative, NEYMAR negative    Serologies Latest Ref Rng & Units 10/12/2018   NEYMAR, IFA  Negative   ASMCA 0 - 19 Units 73 (H)   M2 Ab 0.0 - 20.0 Units 25.2 (H)     LIVER HISTOLOGY:  Not available or performed    ENDOSCOPIC PROCEDURES:  3/2017. Colonoscopy by Dr Na Friedman. No polyps. 3/2017. EGD by Dr Na Friedman. Grade 1 esophageal varices. Mild portal gastropathy. 3/2019 Small esophageal varices. No banding performed. Mild portal hypertensive gastropathy. Gastritis of the antrum. No gastric varices identified. RADIOLOGY:  10/2018. Ultrasound of liver. Echogenic consistent with cirrhosis. No liver mass lesions. No dilated bile ducts. No ascites. 6/2019. Ultrasound of liver. Echogenic consistent with cirrhosis. No liver mass lesions. No dilated bile ducts. No ascites. OTHER TESTING:  Not available or performed    FOLLOW-UP:  All of the issues listed above in the Assessment and Plan were discussed with the patient. All questions were answered. The patient expressed a clear understanding of the above. [address] [address] in 3 months to assess the effects and tolerability of OCA. ABHISHEK Pereyra 13 [address] 22. [phone]  Trice Martel MD  I have personally interviewed and examined the patient during the encounter. I have explained the key findings related to the liver disease and other pertinent diagnoses as noted above to the patient and answered all questions. I have reviewed and agree with the findings documented above, including all diagnostic interpretations, and plans. I have edited the original note as appropriate for clarity.       MD Johnathan Waller 13 [address] 22.  [phone]  [address] [place] [address]

## 2019-07-25 NOTE — PROGRESS NOTES
Chief Complaint   Patient presents with    Follow-up     EGD Follow up     Visit Vitals  /63 (BP 1 Location: Left arm, BP Patient Position: Sitting)   Pulse 70   Temp 99.5 °F (37.5 °C) (Tympanic)   Ht 5' 2\" (1.575 m)   Wt 176 lb 9.6 oz (80.1 kg)   SpO2 96%   BMI 32.30 kg/m²     3 most recent PHQ Screens 5/28/2019   Little interest or pleasure in doing things Not at all   Feeling down, depressed, irritable, or hopeless Not at all   Total Score PHQ 2 0     Abuse Screening Questionnaire 5/28/2019   Do you ever feel afraid of your partner? N   Are you in a relationship with someone who physically or mentally threatens you? N   Is it safe for you to go home?  Roxana Prabhakar

## 2019-07-26 LAB
ALBUMIN SERPL-MCNC: 3.4 G/DL (ref 3.5–4.8)
ALP SERPL-CCNC: 272 IU/L (ref 39–117)
ALT SERPL-CCNC: 11 IU/L (ref 0–32)
AST SERPL-CCNC: 20 IU/L (ref 0–40)
BASOPHILS # BLD AUTO: 0 X10E3/UL (ref 0–0.2)
BASOPHILS NFR BLD AUTO: 1 %
BILIRUB DIRECT SERPL-MCNC: 0.77 MG/DL (ref 0–0.4)
BILIRUB SERPL-MCNC: 2.5 MG/DL (ref 0–1.2)
BUN SERPL-MCNC: 24 MG/DL (ref 8–27)
BUN/CREAT SERPL: 21 (ref 12–28)
CALCIUM SERPL-MCNC: 9.8 MG/DL (ref 8.7–10.3)
CHLORIDE SERPL-SCNC: 103 MMOL/L (ref 96–106)
CO2 SERPL-SCNC: 24 MMOL/L (ref 20–29)
CREAT SERPL-MCNC: 1.13 MG/DL (ref 0.57–1)
EOSINOPHIL # BLD AUTO: 0.1 X10E3/UL (ref 0–0.4)
EOSINOPHIL NFR BLD AUTO: 2 %
ERYTHROCYTE [DISTWIDTH] IN BLOOD BY AUTOMATED COUNT: 14.3 % (ref 12.3–15.4)
GLUCOSE SERPL-MCNC: 109 MG/DL (ref 65–99)
HCT VFR BLD AUTO: 39.5 % (ref 34–46.6)
HGB BLD-MCNC: 13.8 G/DL (ref 11.1–15.9)
IMM GRANULOCYTES # BLD AUTO: 0 X10E3/UL (ref 0–0.1)
IMM GRANULOCYTES NFR BLD AUTO: 0 %
INR PPP: 1.2 (ref 0.8–1.2)
LYMPHOCYTES # BLD AUTO: 0.9 X10E3/UL (ref 0.7–3.1)
LYMPHOCYTES NFR BLD AUTO: 21 %
MCH RBC QN AUTO: 34.2 PG (ref 26.6–33)
MCHC RBC AUTO-ENTMCNC: 34.9 G/DL (ref 31.5–35.7)
MCV RBC AUTO: 98 FL (ref 79–97)
MONOCYTES # BLD AUTO: 0.8 X10E3/UL (ref 0.1–0.9)
MONOCYTES NFR BLD AUTO: 18 %
NEUTROPHILS # BLD AUTO: 2.6 X10E3/UL (ref 1.4–7)
NEUTROPHILS NFR BLD AUTO: 58 %
PLATELET # BLD AUTO: 124 X10E3/UL (ref 150–450)
POTASSIUM SERPL-SCNC: 4.5 MMOL/L (ref 3.5–5.2)
PROT SERPL-MCNC: 8.1 G/DL (ref 6–8.5)
PROTHROMBIN TIME: 11.9 SEC (ref 9.1–12)
RBC # BLD AUTO: 4.03 X10E6/UL (ref 3.77–5.28)
SODIUM SERPL-SCNC: 140 MMOL/L (ref 134–144)
WBC # BLD AUTO: 4.4 X10E3/UL (ref 3.4–10.8)

## 2019-07-30 NOTE — PROGRESS NOTES
Called patient with blood work results. The ALP was elevated slightly after starting the OCA 5 mg weekly. Will repeat in 3 months, if it remains elevated the dose will be increased to 10 mg weekly. Patient aware of plan.

## 2019-10-25 ENCOUNTER — OFFICE VISIT (OUTPATIENT)
Dept: HEMATOLOGY | Age: 74
End: 2019-10-25

## 2019-10-25 VITALS
SYSTOLIC BLOOD PRESSURE: 117 MMHG | BODY MASS INDEX: 34.3 KG/M2 | OXYGEN SATURATION: 97 % | WEIGHT: 186.4 LBS | RESPIRATION RATE: 17 BRPM | HEART RATE: 65 BPM | HEIGHT: 62 IN | TEMPERATURE: 98.2 F | DIASTOLIC BLOOD PRESSURE: 60 MMHG

## 2019-10-25 DIAGNOSIS — K74.3 PRIMARY BILIARY CHOLANGITIS (HCC): Primary | ICD-10-CM

## 2019-10-25 NOTE — PROGRESS NOTES
[facility], Alea EPSTEIN Karol Grime, MD Devota Lacrosse, RICHARD Laguna, [facility]     April S Bruno, [facility]   Brittani Nagy, FNP-HUMBLE Boyle, [facility]       Fei Rodriguezuta [facility] 136    at [address], [address] [address]  22.    [phone]    FAX: [address] [place] [address] Drive, [address], [address]    [phone]    FAX: [phone]       Patient Care Team:  Ghanshyam Hull MD as PCP - General (Family Practice)      Problem List  Date Reviewed: 8/26/2019          Codes Class Noted    Primary biliary cholangitis [facility]) ICD-10-CM: K74.3  ICD-9-CM: 571.6  10/12/2018        Cirrhosis ([facility] ICD-10-CM: K74.60  ICD-9-CM: 571.5  10/12/2018        Thrombocytopenia ([facility] ICD-10-CM: D69.6  ICD-9-CM: 287.5  10/12/2018        Ascites ICD-10-CM: R18.8  ICD-9-CM: 789.59  10/12/2018              Beth Bender returns to the [facility] for management of primary biliary cholangitis. The active problem list, all pertinent past medical history, medications, endoscopic studies, radiologic findings and laboratory findings related to the liver disorder were reviewed with the patient. The patient is a 76 y.o.  female who was found to have chronic liver disease, cirrhosis and PBC in 2017 when she developed ascites. Assessment of liver fibrosis was performed in the office today with Fibroscan. The result was 13.0 kPa which correlates with Bridging fibrosis-cirrhosis. The CAP score of 256 suggests mild fatty liver. Serologic evaluation for markers of chronic liver disease was positive for AMA.       The patient has developed the following complications of cirrhosis: ascites and lower extremity edema which are well controlled on step 1 diuretic therapy. The patient notes itching. The patient has not experienced pain in the right side over the liver, dry eyes, dry mouth or dark urine. The patient completes all daily activities without any functional limitations. 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 The diagnosis of cirrhosis is based upon laboratory studies, imaging and complications of cirrhosis. Elastography performed 10/2019 suggests stage 3-4 fibrosis. The CTP is 6. Child class A. The MELD score is 10. Primary biliary cholangitis  The diagnosis is based upon serology and an elevation in ALP. Serologic testing for causes of chronic liver disease were ordered. These were positive for ASMA and AMA. A liver biopsy has not been performed. The patient is being treated with JOLIE 1500 mg every day   The patient is tolerating the treatment well without any side effects. The patient has had persistent elevation in ALP despite being on JOLIE. She was as started on OCA in 6/2019 at a dose of 5 mg Qweek  She has tolerated the medication well with only mild nausea for 1-2 days after taking the medication. The side effects of OCA including a 50% risk of itching were discussed. If the ALP remains elevated with today's blood work will increase the dose of OCA to 10 mg weekly. Have performed laboratory testing to monitor liver function and degree of liver injury. This included BMP, hepatic panel, CBC with platelet count and INR. Liver transaminases are normal.  ALP is elevated. Total bilirubin is mildly elevated. Serum albumin is depressed. INR is normal.  The platelet count is depressed. Ascites   Has resolved with current dose of diuretics. Will continue the current dose of diuretics at step 1. Lower extremity edema  This has resolved with current dose of diuretics.     Screening for esophageal varices   The patient has small esophageal varices without prior bleeding. The last EGD to assess for varices was performed in 3/2019. EGD to assess for varices and need for banding will not need to be repeated until 3/2021    Hepatic encephalopathy   This has not developed to date. There is no need for treatment with lactulose and/or Xifaxan at this time. No need to restrict dietary protein at this time. Itching   This is secondary to the underlying liver disease. It is mild and the patient does not feel like she needs any medical treatment for this at this time. Essential lavender oil is helping her with this symptom. Screening for hepatocellular carcinoma  HCC screening has recently been performed and does not suggest [address] The next liver imaging study will be performed in 12/2019. Treatment of other medical problems in patients with chronic liver disease  There are no contraindications for the patient to take any medications that are necessary for treatment of other medical issues. The patient does not consume alcohol daily. Normal doses of acetaminophen can be used for pain as needed. Normal doses of acetaminophen as recommended on the label are not hepatotoxic, even in patients with cirrhosis. The patient has cirrhosis. Patients with cirrhosis should not use NSAIDs if possible as this is associated with a higher rate of ILEANA. Counseling for alcohol in patients with chronic liver disease  The patient does not consume any significant amount of alcohol. Thrombocytopenia   This is secondary to cirrhosis. There is no evidence of overt bleeding. No treatment is required. The platelet count is adequate for the patient to undergo procedures without the need for platelet transfusion or platelet growth factors. Osteoporosis  The risk of osteoporosis is increased in patients with PBC and cirrhosis. DEXA bone density to assess for osteoporosis has not been performed.     This should be ordered by the patient's primary care physician. Vaccinations   Routine vaccinations against other bacterial and viral agents can be performed as indicated. Annual flu vaccination should be administered if indicated. ALLERGIES  Allergies   Allergen Reactions    Latex Hives    Sulfa (Sulfonamide Antibiotics) Nausea and Vomiting     MEDICATIONS  Current Outpatient Medications   Medication Sig    obeticholic acid (OCALIVA) 5 mg tab Take 5 mg by mouth every seven (7) days.  ursodiol (ACTIGALL) 500 mg tablet Take 500 mg by mouth three (3) times daily (with meals).  furosemide (LASIX) 40 mg tablet Take  by mouth daily.  spironolactone (ALDACTONE) 100 mg tablet Take  by mouth daily. No current facility-administered medications for this visit. SYSTEM REVIEW NOT RELATED TO LIVER DISEASE OR REVIEWED ABOVE:  Constitution systems: Negative for fever, chills, weight gain, weight loss. Eyes: Negative for visual changes. ENT: Negative for sore throat, painful swallowing. Respiratory: Negative for cough, hemoptysis, SOB. Cardiology: Negative for chest pain, palpitations. GI:  Negative for constipation or diarrhea. : Negative for urinary frequency, dysuria, hematuria, nocturia. Skin: Negative for rash. Hematology: Negative for easy bruising, blood clots. Musculo-skeletal: Negative for back pain, muscle pain, weakness. Neurologic: Negative for headaches, dizziness, vertigo, memory problems not related to HE. Psychology: Negative for anxiety, depression. FAMILY HISTORY:  The patient is adopted and does not know any of her family history. SOCIAL HISTORY:  The patient is . The patient has 3 children and 15 grandchildren. The patient has never used tobacco products. The patient has never consumed significant amounts of alcohol. The patient used to work as .       PHYSICAL EXAMINATION:  Visit Vitals  /60   Pulse 65   Temp 98.2 °F (36.8 °C) (Tympanic)   Resp 17 Ht 5' 2\" (1.575 m)   Wt 186 lb 6.4 oz (84.6 kg)   SpO2 97%   BMI 34.09 kg/m²     General: No acute distress. Eyes: Sclera anicteric. ENT: No oral lesions. Nodes: No adenopathy. Skin: No spider angiomata. No jaundice. No palmar erythema. Respiratory: Lungs clear to auscultation. Cardiovascular: Regular heart rate. No murmurs. No JVD. Abdomen: Soft non-tender. Liver size normal to percussion/palpation. Spleen not palpable. No obvious ascites. Extremities: No edema. No muscle wasting. No gross arthritic changes. Neurologic: Alert and oriented. Cranial nerves grossly intact. No asterixis. LABORATORY STUDIES:  Liver [address] & Units 7/25/2019 5/28/2019   WBC 3.4 - 10.8 x10E3/uL 4.4 3.6   ANC 1.4 - 7.0 x10E3/uL 2.6 2.0   HGB 11.1 - 15.9 g/dL 13.8 13.2    - 450 x10E3/uL 124 (L) 118 (L)   INR 0.8 - 1.2 1.2 1.1   AST 0 - 40 IU/L 20 20   ALT 0 - 32 IU/L 11 10   Alk Phos 39 - 117 IU/L 272 (H) 251 (H)   Bili, Total 0.0 - 1.2 mg/dL 2.5 (H) 1.3 (H)   Bili, Direct 0.00 - 0.40 mg/dL 0.77 (H) 0.56 (H)   Albumin 3.5 - 4.8 g/dL 3.4 (L) 3.1 (L)   BUN 8 - 27 mg/dL 24 12   Creat 0.57 - 1.00 mg/dL 1.13 (H) 0.83   Na 134 - 144 mmol/L 140 139   K 3.5 - 5.2 mmol/L 4.5 3.9   Cl 96 - 106 mmol/L 103 103   CO2 20 - 29 mmol/L 24 24   Glucose 65 - 99 mg/dL 109 (H) 117 (H)     Liver [place] [address] Ref Rng & Units 1/14/2019   WBC 3.4 - 10.8 x10E3/uL 3.9   ANC 1.4 - 7.0 x10E3/uL    HGB 11.1 - 15.9 g/dL 13.5    - 450 x10E3/uL 116 (L)   INR 0.8 - 1.2 1.1   AST 0 - 40 IU/L 22   ALT 0 - 32 IU/L 11   Alk Phos 39 - 117 IU/L 223 (H)   Bili, Total 0.0 - 1.2 mg/dL 2.0 (H)   Bili, Direct 0.00 - 0.40 mg/dL 0.80 (H)   Albumin 3.5 - 4.8 g/dL 3.1 (L)   BUN 8 - 27 mg/dL 13   Creat 0.57 - 1.00 mg/dL 0.74   Na 134 - 144 mmol/L 140   K 3.5 - 5.2 mmol/L 4.1   Cl 96 - 106 mmol/L 104   CO2 20 - 29 mmol/L 25   Glucose 65 - 99 mg/dL 94       SEROLOGIES:  8/2018.   HBsurface antigen negative, anti-HBcore total negative, anti-HBsurface negative, anti-HCV negative, NEYMAR negative    Serologies Latest Ref Rng & Units 10/12/2018   NEYMAR, IFA  Negative   ASMCA 0 - 19 Units 73 (H)   M2 Ab 0.0 - 20.0 Units 25.2 (H)     LIVER HISTOLOGY:  10/2019. FibroScan performed at [address]. EkPa was 13.0. IQR/med 18%. . The results suggested a fibrosis level of F3-4. The CAP score suggests mild fatty liver. ENDOSCOPIC PROCEDURES:  3/2017. Colonoscopy by Dr Tomas Addison. No polyps. 3/2017. EGD by Dr Tomas Addison. Grade 1 esophageal varices. Mild portal gastropathy. 3/2019 Small esophageal varices. No banding performed. Mild portal hypertensive gastropathy. Gastritis of the antrum. No gastric varices identified. RADIOLOGY:  10/2018. Ultrasound of liver. Echogenic consistent with cirrhosis. No liver mass lesions. No dilated bile ducts. No ascites. 6/2019. Ultrasound of liver. Echogenic consistent with cirrhosis. No liver mass lesions. No dilated bile ducts. No ascites. OTHER TESTING:  Not available or performed    FOLLOW-UP:  All of the issues listed above in the Assessment and Plan were discussed with the patient. All questions were answered. The patient expressed a clear understanding of the above. [address] [address] in 3 months to assess the effects and tolerability of OCA. ABHISHEK Pereyra 76 [id number] Amari Bales, 2000 [facility] [place] 22.  201 [facility]

## 2019-10-25 NOTE — PROGRESS NOTES
Chief Complaint   Patient presents with    Follow-up     fibroscan     Learning Assessment 10/25/2019   PRIMARY LEARNER Patient   BARRIERS PRIMARY LEARNER NONE   CO-LEARNER CAREGIVER No   PRIMARY LANGUAGE ENGLISH   LEARNER PREFERENCE PRIMARY DEMONSTRATION   ANSWERED BY patient   RELATIONSHIP SELF     3 most recent PHQ Screens 10/25/2019   Little interest or pleasure in doing things Not at all   Feeling down, depressed, irritable, or hopeless Not at all   Total Score PHQ 2 0     Abuse Screening Questionnaire 10/25/2019   Do you ever feel afraid of your partner? N   Are you in a relationship with someone who physically or mentally threatens you? N   Is it safe for you to go home?  Shaina Estes

## 2019-10-26 LAB
ALBUMIN SERPL-MCNC: 3.1 G/DL (ref 3.5–4.8)
ALP SERPL-CCNC: 207 IU/L (ref 39–117)
ALT SERPL-CCNC: 12 IU/L (ref 0–32)
AST SERPL-CCNC: 22 IU/L (ref 0–40)
BASOPHILS # BLD AUTO: 0 X10E3/UL (ref 0–0.2)
BASOPHILS NFR BLD AUTO: 1 %
BILIRUB DIRECT SERPL-MCNC: 0.7 MG/DL (ref 0–0.4)
BILIRUB SERPL-MCNC: 1.8 MG/DL (ref 0–1.2)
BUN SERPL-MCNC: 16 MG/DL (ref 8–27)
BUN/CREAT SERPL: 16 (ref 12–28)
CALCIUM SERPL-MCNC: 8.9 MG/DL (ref 8.7–10.3)
CHLORIDE SERPL-SCNC: 105 MMOL/L (ref 96–106)
CO2 SERPL-SCNC: 21 MMOL/L (ref 20–29)
CREAT SERPL-MCNC: 0.99 MG/DL (ref 0.57–1)
EOSINOPHIL # BLD AUTO: 0.1 X10E3/UL (ref 0–0.4)
EOSINOPHIL NFR BLD AUTO: 1 %
ERYTHROCYTE [DISTWIDTH] IN BLOOD BY AUTOMATED COUNT: 13 % (ref 12.3–15.4)
GLUCOSE SERPL-MCNC: 96 MG/DL (ref 65–99)
HCT VFR BLD AUTO: 36.5 % (ref 34–46.6)
HGB BLD-MCNC: 13 G/DL (ref 11.1–15.9)
IMM GRANULOCYTES # BLD AUTO: 0 X10E3/UL (ref 0–0.1)
IMM GRANULOCYTES NFR BLD AUTO: 0 %
INR PPP: 1.1 (ref 0.8–1.2)
LYMPHOCYTES # BLD AUTO: 0.7 X10E3/UL (ref 0.7–3.1)
LYMPHOCYTES NFR BLD AUTO: 15 %
MCH RBC QN AUTO: 34.7 PG (ref 26.6–33)
MCHC RBC AUTO-ENTMCNC: 35.6 G/DL (ref 31.5–35.7)
MCV RBC AUTO: 97 FL (ref 79–97)
MONOCYTES # BLD AUTO: 0.9 X10E3/UL (ref 0.1–0.9)
MONOCYTES NFR BLD AUTO: 18 %
NEUTROPHILS # BLD AUTO: 3 X10E3/UL (ref 1.4–7)
NEUTROPHILS NFR BLD AUTO: 65 %
PLATELET # BLD AUTO: 121 X10E3/UL (ref 150–450)
POTASSIUM SERPL-SCNC: 3.8 MMOL/L (ref 3.5–5.2)
PROT SERPL-MCNC: 7.1 G/DL (ref 6–8.5)
PROTHROMBIN TIME: 11.7 SEC (ref 9.1–12)
RBC # BLD AUTO: 3.75 X10E6/UL (ref 3.77–5.28)
SODIUM SERPL-SCNC: 140 MMOL/L (ref 134–144)
WBC # BLD AUTO: 4.7 X10E3/UL (ref 3.4–10.8)

## 2019-10-28 LAB
AFP L3 MFR SERPL: NORMAL % (ref 0–9.9)
AFP SERPL-MCNC: 3.9 NG/ML (ref 0–8)

## 2019-10-29 DIAGNOSIS — K74.3 PRIMARY BILIARY CHOLANGITIS (HCC): ICD-10-CM

## 2019-10-29 NOTE — PROGRESS NOTES
Called patient x 3 unable to reach by phone, voicemail is full. Letter sent with plan regarding medication. Will increase to 5 mg biweekly. Doses must be 3 days apart. New prescription sent to the pharmacy.  The blood work has improved but is not normal.

## 2019-11-04 DIAGNOSIS — K74.3 PRIMARY BILIARY CHOLANGITIS (HCC): ICD-10-CM

## 2019-11-04 NOTE — TELEPHONE ENCOUNTER
Requested Prescriptions     Pending Prescriptions Disp Refills    obeticholic acid (OCALIVA) 5 mg tab 24 Tab 3     Sig: Take 5 mg by mouth two (2) days a week. Doses must be 3 days apart     Pt called for Rx refill.

## 2020-01-08 ENCOUNTER — TELEPHONE (OUTPATIENT)
Dept: HEMATOLOGY | Age: 75
End: 2020-01-08

## 2020-01-08 NOTE — TELEPHONE ENCOUNTER
Tried calling the patient to reschedule her appointment for 01/21/2020 because April will not be here in the office. Left a voice message to please give us a return call here at the office.

## 2020-01-10 ENCOUNTER — TELEPHONE (OUTPATIENT)
Dept: HEMATOLOGY | Age: 75
End: 2020-01-10

## 2020-01-10 NOTE — TELEPHONE ENCOUNTER
recieved request for PA ,     attempted PA online and receievd a message stating  \"Authorization already on file for this request.\"    called insurance appears to be a glitch right now so I called pharmacy who sent PA request and was on hold for 23mins had to hang up. as of now appears PA may not be needed     spoke with cover my meds rep Cristian Copier. H  who states she wasn't able to locate any requests in system that would cause [employer] to return this message.  It is possible the pharmacy is trying to fill the Rx too soon

## 2020-01-15 ENCOUNTER — TELEPHONE (OUTPATIENT)
Dept: HEMATOLOGY | Age: 75
End: 2020-01-15

## 2020-01-15 NOTE — TELEPHONE ENCOUNTER
Tried calling the patient to reschedule her upcoming appointment. Left a voice message for the patient to please give us a return call here at the office.

## 2020-02-10 ENCOUNTER — OFFICE VISIT (OUTPATIENT)
Dept: HEMATOLOGY | Age: 75
End: 2020-02-10

## 2020-02-10 VITALS
HEIGHT: 62 IN | SYSTOLIC BLOOD PRESSURE: 123 MMHG | DIASTOLIC BLOOD PRESSURE: 58 MMHG | BODY MASS INDEX: 35.15 KG/M2 | TEMPERATURE: 97.9 F | HEART RATE: 74 BPM | OXYGEN SATURATION: 97 % | WEIGHT: 191 LBS

## 2020-02-10 DIAGNOSIS — R18.8 OTHER ASCITES: ICD-10-CM

## 2020-02-10 DIAGNOSIS — K74.69 OTHER CIRRHOSIS OF LIVER (HCC): ICD-10-CM

## 2020-02-10 DIAGNOSIS — K74.3 PRIMARY BILIARY CHOLANGITIS (HCC): Primary | ICD-10-CM

## 2020-02-10 DIAGNOSIS — R60.1 GENERALIZED EDEMA: ICD-10-CM

## 2020-02-10 RX ORDER — FUROSEMIDE 40 MG/1
40 TABLET ORAL DAILY
Qty: 90 TAB | Refills: 3 | Status: SHIPPED | OUTPATIENT
Start: 2020-02-10 | End: 2021-11-16 | Stop reason: SDUPTHER

## 2020-02-10 RX ORDER — SPIRONOLACTONE 100 MG/1
100 TABLET, FILM COATED ORAL DAILY
Qty: 90 TAB | Refills: 3 | Status: SHIPPED | OUTPATIENT
Start: 2020-02-10 | End: 2021-11-04 | Stop reason: SDUPTHER

## 2020-02-10 NOTE — PROGRESS NOTES
[facility], Liliane EPSTEIN Kinnie Nice, MD Fausto Gaster, RICHARD Laura, [facility]     April S Brnuo, [facility]   Greg Hernandez JARED-HUMBLE Moore, [facility]       Fei Pollard [facility] 136    at [address], [facility] Mal Barajas  22.    [phone]    FAX: [address] [place] [address], [address]    [phone]    FAX: [phone]       Patient Care Team:  Dustin Corcoran MD as PCP - [facility]  Dustin Corcoran MD as PCP - [facility]      Problem List  Date Reviewed: 10/25/2019          Codes Class Noted    Primary biliary cholangitis [facility]) ICD-10-CM: K74.3  ICD-9-CM: 571.6  10/12/2018        Cirrhosis ([facility] ICD-10-CM: K74.60  ICD-9-CM: 571.5  10/12/2018        Thrombocytopenia ([facility] ICD-10-CM: D69.6  ICD-9-CM: 287.5  10/12/2018        Ascites ICD-10-CM: R18.8  ICD-9-CM: 789.59  10/12/2018              Angelique Trammell returns to the [address] for management of primary biliary cholangitis. The active problem list, all pertinent past medical history, medications, endoscopic studies, radiologic findings and laboratory findings related to the liver disorder were reviewed with the patient. The patient is a 76 y.o.  female who was found to have chronic liver disease, cirrhosis and PBC in 2017 when she developed ascites. Assessment of liver fibrosis was performed with Fibroscan 10/2019. The result was 13.0 kPa which correlates with Bridging fibrosis-cirrhosis. The CAP score of 256 suggests mild fatty liver. Serologic evaluation for markers of chronic liver disease was positive for AMA.       The patient is currently receiving treatment for PBC with JOLIE 1500 mg daily along with OCA 5 mg twice weekly. She is tolerating treatment with minimal side effects of itching. The patient has developed the following complications of cirrhosis: ascites and lower extremity edema which are well controlled on step 1 diuretic therapy. The patient notes itching. The patient has not experienced pain in the right side over the liver, dry eyes, dry mouth or dark urine. The patient completes all daily activities without any functional limitations. 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 The diagnosis of cirrhosis is based upon laboratory studies, imaging and complications of cirrhosis. Elastography performed 10/2019 suggests stage 3-4 fibrosis. The CTP is 6. Child class A. The MELD score is 10. Primary biliary cholangitis  The diagnosis is based upon serology and an elevation in ALP. Serologic testing for causes of chronic liver disease were ordered. These were positive for ASMA and AMA. A liver biopsy has not been performed. The patient is being treated with JOLIE 1500 mg every day   The patient is tolerating the treatment well without any side effects. The patient has had persistent elevation in ALP despite being on JOLIE. She was as started on OCA in 6/2019 at a dose of 5 mg Qweek  She has tolerated the medication well with only mild nausea for 1-2 days after taking the medication. The ALP continued to be elevated, OCA was increased to twice weekly. Have performed laboratory testing to monitor liver function and degree of liver injury. This included BMP, hepatic panel, CBC with platelet count and INR. Liver transaminases are normal.  ALP is elevated. Total bilirubin is mildly elevated. Serum albumin is depressed. INR is normal.  The platelet count is depressed. Ascites   Has resolved with current dose of diuretics. Will continue the current dose of diuretics at step 1.     Lower extremity edema  This has resolved with current dose of diuretics. Screening for esophageal varices   The patient has small esophageal varices without prior bleeding. The last EGD to assess for varices was performed in 3/2019. EGD to assess for varices and need for banding will not need to be repeated until 3/2021    Hepatic encephalopathy   This has not developed to date. There is no need for treatment with lactulose and/or Xifaxan at this time. No need to restrict dietary protein at this time. Itching   This is secondary to the underlying liver disease. It is mild and the patient does not feel like she needs any medical treatment for this at this time. Essential lavender oil is helping her with this symptom. Cramping   Intermittent cramping in hands. Will order magnesium level today to assess for depletion from diuretics. Add oral magnesium as needed. Screening for hepatocellular carcinoma  HCC screening has recently been performed and does not suggest [address] The next liver imaging study will be performed in 12/2019. Treatment of other medical problems in patients with chronic liver disease  There are no contraindications for the patient to take any medications that are necessary for treatment of other medical issues. The patient does not consume alcohol daily. Normal doses of acetaminophen can be used for pain as needed. Normal doses of acetaminophen as recommended on the label are not hepatotoxic, even in patients with cirrhosis. The patient has cirrhosis. Patients with cirrhosis should not use NSAIDs if possible as this is associated with a higher rate of ILEANA. Counseling for alcohol in patients with chronic liver disease  The patient does not consume any significant amount of alcohol. Thrombocytopenia   This is secondary to cirrhosis. There is no evidence of overt bleeding. No treatment is required.   The platelet count is adequate for the patient to undergo procedures without the need for platelet transfusion or platelet growth factors. Osteoporosis  The risk of osteoporosis is increased in patients with PBC and cirrhosis. DEXA bone density to assess for osteoporosis has not been performed. This should be ordered by the patient's primary care physician. Vaccinations   Routine vaccinations against other bacterial and viral agents can be performed as indicated. Annual flu vaccination should be administered if indicated. ALLERGIES  Allergies   Allergen Reactions    Latex Hives    Sulfa (Sulfonamide Antibiotics) Nausea and Vomiting     MEDICATIONS  Current Outpatient Medications   Medication Sig    obeticholic acid (OCALIVA) 5 mg tab Take 5 mg by mouth two (2) days a week. Doses must be 3 days apart    ursodiol (ACTIGALL) 500 mg tablet Take 500 mg by mouth three (3) times daily (with meals).  furosemide (LASIX) 40 mg tablet Take  by mouth daily.  spironolactone (ALDACTONE) 100 mg tablet Take  by mouth daily. No current facility-administered medications for this visit. SYSTEM REVIEW NOT RELATED TO LIVER DISEASE OR REVIEWED ABOVE:  Constitution systems: Negative for fever, chills, weight gain, weight loss. Eyes: Negative for visual changes. ENT: Negative for sore throat, painful swallowing. Respiratory: Negative for cough, hemoptysis, SOB. Cardiology: Negative for chest pain, palpitations. GI:  Negative for constipation or diarrhea. : Negative for urinary frequency, dysuria, hematuria, nocturia. Skin: Negative for rash. Hematology: Negative for easy bruising, blood clots. Musculo-skeletal: Negative for back pain, muscle pain, weakness. Neurologic: Negative for headaches, dizziness, vertigo, memory problems not related to HE. Psychology: Negative for anxiety, depression. FAMILY HISTORY:  The patient is adopted and does not know any of her family history. SOCIAL HISTORY:  The patient is .     The patient has 3 children and 15 grandchildren. The patient has never used tobacco products. The patient has never consumed significant amounts of alcohol. The patient used to work as . PHYSICAL EXAMINATION:  Visit Vitals  /58 (BP 1 Location: Left arm, BP Patient Position: Sitting)   Pulse 74   Temp 97.9 °F (36.6 °C) (Tympanic)   Ht 5' 2\" (1.575 m)   Wt 191 lb (86.6 kg)   SpO2 97%   BMI 34.93 kg/m²     General: No acute distress. Eyes: Sclera anicteric. ENT: No oral lesions. Nodes: No adenopathy. Skin: No spider angiomata. No jaundice. No palmar erythema. Respiratory: Lungs clear to auscultation. Cardiovascular: Regular heart rate. No murmurs. No JVD. Abdomen: Soft non-tender. Liver size normal to percussion/palpation. Spleen not palpable. No obvious ascites. Extremities: No edema. No muscle wasting. No gross arthritic changes. Neurologic: Alert and oriented. Cranial nerves grossly intact. No asterixis.     LABORATORY STUDIES:  L[place] of [address] [place] [address] & Units 10/25/2019 7/25/2019   WBC 3.4 - 10.8 x10E3/uL 4.7 4.4   ANC 1.4 - 7.0 x10E3/uL 3.0 2.6   HGB 11.1 - 15.9 g/dL 13.0 13.8    - 450 x10E3/uL 121 (L) 124 (L)   INR 0.8 - 1.2 1.1 1.2   AST 0 - 40 IU/L 22 20   ALT 0 - 32 IU/L 12 11   Alk Phos 39 - 117 IU/L 207 (H) 272 (H)   Bili, Total 0.0 - 1.2 mg/dL 1.8 (H) 2.5 (H)   Bili, Direct 0.00 - 0.40 mg/dL 0.70 (H) 0.77 (H)   Albumin 3.5 - 4.8 g/dL 3.1 (L) 3.4 (L)   BUN 8 - 27 mg/dL 16 24   Creat 0.57 - 1.00 mg/dL 0.99 1.13 (H)   Na 134 - 144 mmol/L 140 140   K 3.5 - 5.2 mmol/L 3.8 4.5   Cl 96 - 106 mmol/L 105 103   CO2 20 - 29 mmol/L 21 24   Glucose 65 - 99 mg/dL 96 109 (H)     Liver [place] of [address] [place] [address] Ref Rng & Units 5/28/2019   WBC 3.4 - 10.8 x10E3/uL 3.6   ANC 1.4 - 7.0 x10E3/uL 2.0   HGB 11.1 - 15.9 g/dL 13.2    - 450 x10E3/uL 118 (L)   INR 0.8 - 1.2 1.1   AST 0 - 40 IU/L 20   ALT 0 - 32 IU/L 10   Alk Phos 39 - 117 IU/L 251 (H)   Bili, Total 0.0 - 1.2 mg/dL 1.3 (H)   Bili, Direct 0.00 - 0.40 mg/dL 0.56 (H)   Albumin 3.5 - 4.8 g/dL 3.1 (L)   BUN 8 - 27 mg/dL 12   Creat 0.57 - 1.00 mg/dL 0.83   Na 134 - 144 mmol/L 139   K 3.5 - 5.2 mmol/L 3.9   Cl 96 - 106 mmol/L 103   CO2 20 - 29 mmol/L 24   Glucose 65 - 99 mg/dL 117 (H)     Repeat testing done today. Will follow up when available. SEROLOGIES:  8/2018. HBsurface antigen negative, anti-HBcore total negative, anti-HBsurface negative, anti-HCV negative, NEYMAR negative    Serologies Latest Ref Rng & Units 10/12/2018   NEYMAR, IFA  Negative   ASMCA 0 - 19 Units 73 (H)   M2 Ab 0.0 - 20.0 Units 25.2 (H)     LIVER HISTOLOGY:  10/2019. [facility]. EkPa was 13.0. IQR/med 18%. . The results suggested a fibrosis level of F3-4. The CAP score suggests mild fatty liver. ENDOSCOPIC PROCEDURES:  3/2017. Colonoscopy by Dr Psuhpa Cruz. No polyps. 3/2017. EGD by Dr Pushpa Cruz. Grade 1 esophageal varices. Mild portal gastropathy. 3/2019 Small esophageal varices. No banding performed. Mild portal hypertensive gastropathy. Gastritis of the antrum. No gastric varices identified. RADIOLOGY:  10/2018. Ultrasound of liver. Echogenic consistent with cirrhosis. No liver mass lesions. No dilated bile ducts. No ascites. 6/2019. Ultrasound of liver. Echogenic consistent with cirrhosis. No liver mass lesions. No dilated bile ducts. No ascites. OTHER TESTING:  Not available or performed    FOLLOW-UP:  All of the issues listed above in the Assessment and Plan were discussed with the patient. All questions were answered. The patient expressed a clear understanding of the above. [address] [id number] in 3 months to assess the effects and tolerability of OCA. CHENCHO Pereyra-BC  [address] [facility] of [address] [address] [facility] 77 [address] [place], [address] [place] [address] 22.  201 [facility]

## 2020-02-10 NOTE — PROGRESS NOTES
gAustín Gibbs is a 76 y.o. female  Chief Complaint   Patient presents with    Follow-up     3 mo f/u         Visit Vitals  /58 (BP 1 Location: Left arm, BP Patient Position: Sitting)   Pulse 74   Temp 97.9 °F (36.6 °C) (Tympanic)   Ht 5' 2\" (1.575 m)   Wt 191 lb (86.6 kg)   SpO2 97%   BMI 34.93 kg/m²       3 most recent PHQ Screens 2/10/2020   Little interest or pleasure in doing things Not at all   Feeling down, depressed, irritable, or hopeless Not at all   Total Score PHQ 2 0     Learning Assessment 10/25/2019   PRIMARY LEARNER Patient   BARRIERS PRIMARY LEARNER NONE   CO-LEARNER CAREGIVER No   PRIMARY LANGUAGE ENGLISH   LEARNER PREFERENCE PRIMARY DEMONSTRATION   ANSWERED BY patient   RELATIONSHIP SELF     Abuse Screening Questionnaire 10/25/2019   Do you ever feel afraid of your partner? N   Are you in a relationship with someone who physically or mentally threatens you? N   Is it safe for you to go home? Y         1. Have you been to the ER, urgent care clinic since your last visit? Hospitalized since your last visit? No    2. Have you seen or consulted any other health care providers outside of the [address] [place] [address] since your last visit? Include any pap smears or colon screening.  No

## 2020-02-11 LAB
ALBUMIN SERPL-MCNC: 3.3 G/DL (ref 3.7–4.7)
ALP SERPL-CCNC: 223 IU/L (ref 39–117)
ALT SERPL-CCNC: 12 IU/L (ref 0–32)
AST SERPL-CCNC: 23 IU/L (ref 0–40)
BASOPHILS # BLD AUTO: 0 X10E3/UL (ref 0–0.2)
BASOPHILS NFR BLD AUTO: 1 %
BILIRUB DIRECT SERPL-MCNC: 0.84 MG/DL (ref 0–0.4)
BILIRUB SERPL-MCNC: 1.6 MG/DL (ref 0–1.2)
BUN SERPL-MCNC: 14 MG/DL (ref 8–27)
BUN/CREAT SERPL: 14 (ref 12–28)
CALCIUM SERPL-MCNC: 9 MG/DL (ref 8.7–10.3)
CHLORIDE SERPL-SCNC: 99 MMOL/L (ref 96–106)
CO2 SERPL-SCNC: 23 MMOL/L (ref 20–29)
CREAT SERPL-MCNC: 1.03 MG/DL (ref 0.57–1)
EOSINOPHIL # BLD AUTO: 0.1 X10E3/UL (ref 0–0.4)
EOSINOPHIL NFR BLD AUTO: 2 %
ERYTHROCYTE [DISTWIDTH] IN BLOOD BY AUTOMATED COUNT: 12.9 % (ref 11.7–15.4)
GLUCOSE SERPL-MCNC: 104 MG/DL (ref 65–99)
HCT VFR BLD AUTO: 41.5 % (ref 34–46.6)
HGB BLD-MCNC: 14.5 G/DL (ref 11.1–15.9)
IMM GRANULOCYTES # BLD AUTO: 0 X10E3/UL (ref 0–0.1)
IMM GRANULOCYTES NFR BLD AUTO: 1 %
INR PPP: 1.1 (ref 0.8–1.2)
LYMPHOCYTES # BLD AUTO: 0.8 X10E3/UL (ref 0.7–3.1)
LYMPHOCYTES NFR BLD AUTO: 19 %
MAGNESIUM SERPL-MCNC: 2.2 MG/DL (ref 1.6–2.3)
MCH RBC QN AUTO: 34 PG (ref 26.6–33)
MCHC RBC AUTO-ENTMCNC: 34.9 G/DL (ref 31.5–35.7)
MCV RBC AUTO: 97 FL (ref 79–97)
MONOCYTES # BLD AUTO: 0.7 X10E3/UL (ref 0.1–0.9)
MONOCYTES NFR BLD AUTO: 17 %
NEUTROPHILS # BLD AUTO: 2.5 X10E3/UL (ref 1.4–7)
NEUTROPHILS NFR BLD AUTO: 60 %
PLATELET # BLD AUTO: 133 X10E3/UL (ref 150–450)
POTASSIUM SERPL-SCNC: 4.5 MMOL/L (ref 3.5–5.2)
PROT SERPL-MCNC: 7.3 G/DL (ref 6–8.5)
PROTHROMBIN TIME: 11.4 SEC (ref 9.1–12)
RBC # BLD AUTO: 4.27 X10E6/UL (ref 3.77–5.28)
SODIUM SERPL-SCNC: 135 MMOL/L (ref 134–144)
WBC # BLD AUTO: 4.1 X10E3/UL (ref 3.4–10.8)

## 2020-02-11 NOTE — PROGRESS NOTES
Letter sent with blood work results which have an improved bilirubin. Continue medication as prescribed. We will see her back in 3 months.

## 2020-05-06 ENCOUNTER — VIRTUAL VISIT (OUTPATIENT)
Dept: HEMATOLOGY | Age: 75
End: 2020-05-06

## 2020-05-06 DIAGNOSIS — K74.3 PRIMARY BILIARY CHOLANGITIS (HCC): ICD-10-CM

## 2020-05-06 DIAGNOSIS — K74.69 OTHER CIRRHOSIS OF LIVER (HCC): Primary | ICD-10-CM

## 2020-05-06 NOTE — PROGRESS NOTES
[address] [facility], MD, [address], [place]ite Araceli Mustafa MD Michelina Staggers, RICHARD Ham, [facility]     Lori Carr, [facility]   Bong Cordero, FNP-C    Fely Montes, [facility]       Fei Pollard [facility] 136    at [address], [facility] Mal Barajas  22.    [phone]    FAX: [address] [place] [address], [address]    [phone]    FAX: [phone]       Patient Care Team:  Helga Ozuna MD as PCP - General (Family Practice)  Helga Ozuna MD as PCP - [facility]      Problem List  Date Reviewed: 2/10/2020          Codes Class Noted    Primary biliary cholangitis [facility]) ICD-10-CM: K74.3  ICD-9-CM: 571.6  10/12/2018        Cirrhosis ([facility].) ICD-10-CM: K74.60  ICD-9-CM: 571.5  10/12/2018        Thrombocytopenia ([facility] ICD-10-CM: D69.6  ICD-9-CM: 287.5  10/12/2018        Ascites ICD-10-CM: R18.8  ICD-9-CM: 789.59  10/12/2018            TELEPHONE VISIT PERFORMED DUE TO COVID-19 EPIDEMIC    Nidia Kunz is a 76 y.o. female evaluated via telephone on 5/6/2020     Consent:  He and/or health care decision maker is aware that that he may receive a bill for this telephone service, depending on his insurance coverage, and has provided verbal consent to proceed: Yes    Documentation:  I communicated with the patient and/or health care decision maker regarding the following:    I affirm this is a Patient Initiated Episode with an Established Patient who has not had a related appointment within my department in the past 7 days or scheduled within the next 24 hours. Total Time: minutes: 15 min.      Note: not billable if this call serves to triage the patient into an appointment for the relevant concern    Sami Ellis returns to the [place] [address] for management of primary biliary cholangitis. The active problem list, all pertinent past medical history, medications, endoscopic studies, radiologic findings and laboratory findings related to the liver disorder were reviewed with the patient. The patient is a 76 y.o.  female who was found to have chronic liver disease, cirrhosis and PBC in 2017 when she developed ascites. Assessment of liver fibrosis was performed with Fibroscan 10/2019. The result was 13.0 kPa which correlates with Bridging fibrosis-cirrhosis. The CAP score of 256 suggests mild fatty liver. Serologic evaluation for markers of chronic liver disease was positive for AMA. The patient is currently receiving treatment for PBC with JOLIE 1500 mg daily along with OCA 5 mg twice weekly. She is tolerating treatment with minimal side effects of itching. The patient has developed the following complications of cirrhosis: ascites and lower extremity edema which are well controlled on step 1 diuretic therapy. The patient notes itching. The patient has not experienced pain in the right side over the liver, dry eyes, dry mouth or dark urine. The patient completes all daily activities without any functional limitations. 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 The diagnosis of cirrhosis is based upon laboratory studies, imaging and complications of cirrhosis. Elastography performed 10/2019 suggests stage 3-4 fibrosis. The CTP is 6. Child class A. The MELD score is 10. Primary biliary cholangitis  The diagnosis is based upon serology and an elevation in ALP. Serologic testing for causes of chronic liver disease were ordered. These were positive for ASMA and AMA. A liver biopsy has not been performed. The patient is being treated with JOLIE 1500 mg every day .  She is tolerating the medication well without side effects. The patient has had persistent elevation in ALP despite being on JOLIE. She was as started on OCA in 6/2019 at a dose of 5 mg Qweek  She has tolerated the medication well with only mild nausea for 1-2 days after taking the medication. The ALP continued to be elevated, OCA was increased to twice weekly. Have performed laboratory testing to monitor liver function and degree of liver injury. This included BMP, hepatic panel, CBC with platelet count and INR. Liver transaminases are normal.  ALP is elevated. Total bilirubin is mildly elevated. Serum albumin is depressed. INR is normal.  The platelet count is depressed. Ascites   Has resolved with current dose of diuretics. Will continue the current dose of diuretics at step 1. Lower extremity edema  This has resolved with current dose of diuretics. Screening for esophageal varices   The patient has small esophageal varices without prior bleeding. The last EGD to assess for varices was performed in 3/2019. EGD to assess for varices and need for banding will not need to be repeated until 3/2021    Hepatic encephalopathy   This has not developed to date. There is no need for treatment with lactulose and/or Xifaxan at this time. No need to restrict dietary protein at this time. Itching   This is secondary to the underlying liver disease. It is mild and the patient does not feel like she needs any medical treatment for this at this time. Essential lavender oil is helping her with this symptom. Screening for hepatocellular carcinoma  HCC screening has recently been performed and does not suggest [address] The next liver imaging study will be performed in 12/2019. Treatment of other medical problems in patients with chronic liver disease  There are no contraindications for the patient to take any medications that are necessary for treatment of other medical issues. The patient does not consume alcohol daily.  Normal doses of acetaminophen can be used for pain as needed. Normal doses of acetaminophen as recommended on the label are not hepatotoxic, even in patients with cirrhosis. The patient has cirrhosis. Patients with cirrhosis should not use NSAIDs if possible as this is associated with a higher rate of ILEANA. Counseling for alcohol in patients with chronic liver disease  The patient does not consume any significant amount of alcohol. Thrombocytopenia   This is secondary to cirrhosis. There is no evidence of overt bleeding. No treatment is required. The platelet count is adequate for the patient to undergo procedures without the need for platelet transfusion or platelet growth factors. Osteoporosis  The risk of osteoporosis is increased in patients with PBC and cirrhosis. DEXA bone density to assess for osteoporosis has not been performed. This should be ordered by the patient's primary care physician. Vaccinations   Routine vaccinations against other bacterial and viral agents can be performed as indicated. Annual flu vaccination should be administered if indicated. ALLERGIES  Allergies   Allergen Reactions    Latex Hives    Sulfa (Sulfonamide Antibiotics) Nausea and Vomiting     MEDICATIONS  Current Outpatient Medications   Medication Sig    furosemide (LASIX) 40 mg tablet Take 1 Tab by mouth daily.  spironolactone (ALDACTONE) 100 mg tablet Take 1 Tab by mouth daily.  obeticholic acid (OCALIVA) 5 mg tab Take 5 mg by mouth two (2) days a week. Doses must be 3 days apart    ursodiol (ACTIGALL) 500 mg tablet Take 500 mg by mouth three (3) times daily (with meals). No current facility-administered medications for this visit. SYSTEM REVIEW NOT RELATED TO LIVER DISEASE OR REVIEWED ABOVE:  Constitution systems: Negative for fever, chills, weight gain, weight loss. Eyes: Negative for visual changes. ENT: Negative for sore throat, painful swallowing.    Respiratory: Negative for cough, hemoptysis, SOB. Cardiology: Negative for chest pain, palpitations. GI:  Negative for constipation or diarrhea. : Negative for urinary frequency, dysuria, hematuria, nocturia. Skin: Negative for rash. Hematology: Negative for easy bruising, blood clots. Musculo-skeletal: Negative for back pain, muscle pain, weakness. Neurologic: Negative for headaches, dizziness, vertigo, memory problems not related to HE. Psychology: Negative for anxiety, depression. FAMILY HISTORY:  The patient is adopted and does not know any of her family history. SOCIAL HISTORY:  The patient is . The patient has 3 children and 15 grandchildren. The patient has never used tobacco products. The patient has never consumed significant amounts of alcohol. The patient used to work as .         LABORATORY STUDIES:  [address] & Units 2/10/2020 10/25/2019   WBC 3.4 - 10.8 x10E3/uL 4.1 4.7   ANC 1.4 - 7.0 x10E3/uL 2.5 3.0   HGB 11.1 - 15.9 g/dL 14.5 13.0    - 450 x10E3/uL 133 (L) 121 (L)   INR 0.8 - 1.2 1.1 1.1   AST 0 - 40 IU/L 23 22   ALT 0 - 32 IU/L 12 12   Alk Phos 39 - 117 IU/L 223 (H) 207 (H)   Bili, Total 0.0 - 1.2 mg/dL 1.6 (H) 1.8 (H)   Bili, Direct 0.00 - 0.40 mg/dL 0.84 (H) 0.70 (H)   Albumin 3.7 - 4.7 g/dL 3.3 (L) 3.1 (L)   BUN 8 - 27 mg/dL 14 16   Creat 0.57 - 1.00 mg/dL 1.03 (H) 0.99   Na 134 - 144 mmol/L 135 140   K 3.5 - 5.2 mmol/L 4.5 3.8   Cl 96 - 106 mmol/L 99 105   CO2 20 - 29 mmol/L 23 21   Glucose 65 - 99 mg/dL 104 (H) 96   Magnesium 1.6 - 2.3 mg/dL 2.2      Liver [place] [address] Ref Rng & Units 7/25/2019   WBC 3.4 - 10.8 x10E3/uL 4.4   ANC 1.4 - 7.0 x10E3/uL 2.6   HGB 11.1 - 15.9 g/dL 13.8    - 450 x10E3/uL 124 (L)   INR 0.8 - 1.2 1.2   AST 0 - 40 IU/L 20   ALT 0 - 32 IU/L 11   Alk Phos 39 - 117 IU/L 272 (H)   Bili, Total 0.0 - 1.2 mg/dL 2.5 (H)   Bili, Direct 0.00 - 0.40 mg/dL 0.77 (H)   Albumin 3.7 - 4.7 g/dL 3.4 (L) BUN 8 - 27 mg/dL 24   Creat 0.57 - 1.00 mg/dL 1.13 (H)   Na 134 - 144 mmol/L 140   K 3.5 - 5.2 mmol/L 4.5   Cl 96 - 106 mmol/L 103   CO2 20 - 29 mmol/L 24   Glucose 65 - 99 mg/dL 109 (H)   Magnesium 1.6 - 2.3 mg/dL        Repeat testing ordered today. Will follow up when available. SEROLOGIES:  8/2018. HBsurface antigen negative, anti-HBcore total negative, anti-HBsurface negative, anti-HCV negative, NEYMAR negative    Serologies Latest Ref Rng & Units 10/12/2018   NEYMAR, IFA  Negative   ASMCA 0 - 19 Units 73 (H)   M2 Ab 0.0 - 20.0 Units 25.2 (H)     LIVER HISTOLOGY:  10/2019. FibroScan performed at [address]. EkPa was 13.0. IQR/med 18%. . The results suggested a fibrosis level of F3-4. The CAP score suggests mild fatty liver. ENDOSCOPIC PROCEDURES:  3/2017. Colonoscopy by Dr Emani Bustos. No polyps. 3/2017. EGD by Dr Emani Bustos. Grade 1 esophageal varices. Mild portal gastropathy. 3/2019 Small esophageal varices. No banding performed. Mild portal hypertensive gastropathy. Gastritis of the antrum. No gastric varices identified. RADIOLOGY:  10/2018. Ultrasound of liver. Echogenic consistent with cirrhosis. No liver mass lesions. No dilated bile ducts. No ascites. 6/2019. Ultrasound of liver. Echogenic consistent with cirrhosis. No liver mass lesions. No dilated bile ducts. No ascites. OTHER TESTING:  Not available or performed    FOLLOW-UP:  Pursuant to the emergency declaration under the [facility] 1135 waiver authority and the [employer] and [employer] Act, this Virtual  Visit was conducted, with the patient's (and/or their legal guardian's) consent, to reduce the patient's risk of exposure to COVID-19 and provide necessary medical care. Services were provided through a video synchronous discussion virtually to substitute for an in-person clinic visit. The patient was located in their home.   The provider was located in the [place] [address] office. Because of the COVID-19 epidemic all non-emergent diagnostic testing and the in-office visit will be delayed by several months to reduce the risk of patient exposure to and potential infection from the novel corona virus. This follow-up appointment may be delayed further if warranted by the status of the epidemic at that time. Orders to obtain laboratory testing will be mailed to the patient. An in-person follow-up visit will be scheduled in 3 months. All of the issues listed above in the Assessment and Plan were discussed with the patient. All questions were answered. The patient expressed a clear understanding of the above. Lori Carr, [facility] [address] [address] Amari Bales, [address] [place] [address] 22.  201 [facility]

## 2020-05-07 DIAGNOSIS — K74.69 OTHER CIRRHOSIS OF LIVER (HCC): ICD-10-CM

## 2020-05-07 DIAGNOSIS — K74.3 PRIMARY BILIARY CHOLANGITIS (HCC): ICD-10-CM

## 2020-05-14 RX ORDER — URSODIOL 500 MG/1
TABLET, FILM COATED ORAL
Qty: 270 TAB | Refills: 0 | Status: SHIPPED | OUTPATIENT
Start: 2020-05-14 | End: 2020-08-10 | Stop reason: SDUPTHER

## 2020-08-10 RX ORDER — URSODIOL 500 MG/1
TABLET, FILM COATED ORAL
Qty: 270 TAB | Refills: 5 | Status: SHIPPED | OUTPATIENT
Start: 2020-08-10 | End: 2021-11-04 | Stop reason: SDUPTHER

## 2020-08-28 DIAGNOSIS — K74.3 PRIMARY BILIARY CHOLANGITIS (HCC): ICD-10-CM

## 2020-08-28 RX ORDER — OBETICHOLIC ACID 5 MG/1
TABLET, FILM COATED ORAL
Qty: 8 TAB | Refills: 5 | Status: SHIPPED | OUTPATIENT
Start: 2020-08-28 | End: 2020-09-28 | Stop reason: SDUPTHER

## 2020-09-02 ENCOUNTER — TELEPHONE (OUTPATIENT)
Dept: HEMATOLOGY | Age: 75
End: 2020-09-02

## 2020-09-02 DIAGNOSIS — K74.3 PRIMARY BILIARY CHOLANGITIS (HCC): ICD-10-CM

## 2020-09-02 NOTE — PROGRESS NOTES
Patient called stating [facility] would not accept an electronic prescription. The prescription was sent electronically in January and accepted. The fax number given to the patient was different than the fax number on the form send by [facility]. Will send it to both fax numbers.

## 2020-09-02 NOTE — TELEPHONE ENCOUNTER
Two pt identifiers confirmed. Received fax for med refill of ocaliva 5mg. Fax failed twice. Called in medication with signature approval.     Alia Garces (Pharmacist) verbalized understanding of information discussed w/ no further questions at this time.

## 2020-09-04 ENCOUNTER — OFFICE VISIT (OUTPATIENT)
Dept: HEMATOLOGY | Age: 75
End: 2020-09-04
Payer: MEDICARE

## 2020-09-04 VITALS
RESPIRATION RATE: 16 BRPM | DIASTOLIC BLOOD PRESSURE: 62 MMHG | WEIGHT: 200.6 LBS | OXYGEN SATURATION: 94 % | BODY MASS INDEX: 36.91 KG/M2 | HEART RATE: 92 BPM | HEIGHT: 62 IN | SYSTOLIC BLOOD PRESSURE: 117 MMHG | TEMPERATURE: 97.2 F

## 2020-09-04 DIAGNOSIS — K74.3 PRIMARY BILIARY CHOLANGITIS (HCC): Primary | ICD-10-CM

## 2020-09-04 DIAGNOSIS — R60.1 GENERALIZED EDEMA: ICD-10-CM

## 2020-09-04 DIAGNOSIS — R18.8 OTHER ASCITES: ICD-10-CM

## 2020-09-04 DIAGNOSIS — E66.01 SEVERE OBESITY (HCC): ICD-10-CM

## 2020-09-04 LAB
ALBUMIN SERPL-MCNC: 3.2 G/DL (ref 3.5–5)
ALBUMIN/GLOB SERPL: 0.8 {RATIO} (ref 1.1–2.2)
ALP SERPL-CCNC: 246 U/L (ref 45–117)
ALT SERPL-CCNC: 16 U/L (ref 12–78)
ANION GAP SERPL CALC-SCNC: 7 MMOL/L (ref 5–15)
AST SERPL-CCNC: 25 U/L (ref 15–37)
BASOPHILS # BLD: 0.1 K/UL (ref 0–0.1)
BASOPHILS NFR BLD: 1 % (ref 0–1)
BILIRUB DIRECT SERPL-MCNC: 0.8 MG/DL (ref 0–0.2)
BILIRUB SERPL-MCNC: 1.8 MG/DL (ref 0.2–1)
BUN SERPL-MCNC: 16 MG/DL (ref 6–20)
BUN/CREAT SERPL: 15 (ref 12–20)
CALCIUM SERPL-MCNC: 8.8 MG/DL (ref 8.5–10.1)
CHLORIDE SERPL-SCNC: 107 MMOL/L (ref 97–108)
CO2 SERPL-SCNC: 24 MMOL/L (ref 21–32)
CREAT SERPL-MCNC: 1.04 MG/DL (ref 0.55–1.02)
DIFFERENTIAL METHOD BLD: ABNORMAL
EOSINOPHIL # BLD: 0.1 K/UL (ref 0–0.4)
EOSINOPHIL NFR BLD: 1 % (ref 0–7)
ERYTHROCYTE [DISTWIDTH] IN BLOOD BY AUTOMATED COUNT: 13.3 % (ref 11.5–14.5)
GLOBULIN SER CALC-MCNC: 4.2 G/DL (ref 2–4)
GLUCOSE SERPL-MCNC: 109 MG/DL (ref 65–100)
HCT VFR BLD AUTO: 42.1 % (ref 35–47)
HGB BLD-MCNC: 13.9 G/DL (ref 11.5–16)
IMM GRANULOCYTES # BLD AUTO: 0 K/UL (ref 0–0.04)
IMM GRANULOCYTES NFR BLD AUTO: 0 % (ref 0–0.5)
INR PPP: 1.1 (ref 0.9–1.1)
LYMPHOCYTES # BLD: 0.7 K/UL (ref 0.8–3.5)
LYMPHOCYTES NFR BLD: 12 % (ref 12–49)
MCH RBC QN AUTO: 35 PG (ref 26–34)
MCHC RBC AUTO-ENTMCNC: 33 G/DL (ref 30–36.5)
MCV RBC AUTO: 106 FL (ref 80–99)
MONOCYTES # BLD: 0.8 K/UL (ref 0–1)
MONOCYTES NFR BLD: 15 % (ref 5–13)
NEUTS SEG # BLD: 3.9 K/UL (ref 1.8–8)
NEUTS SEG NFR BLD: 71 % (ref 32–75)
NRBC # BLD: 0 K/UL (ref 0–0.01)
NRBC BLD-RTO: 0 PER 100 WBC
PLATELET # BLD AUTO: 122 K/UL (ref 150–400)
PMV BLD AUTO: 10.5 FL (ref 8.9–12.9)
POTASSIUM SERPL-SCNC: 4.1 MMOL/L (ref 3.5–5.1)
PROT SERPL-MCNC: 7.4 G/DL (ref 6.4–8.2)
PROTHROMBIN TIME: 11.2 SEC (ref 9–11.1)
RBC # BLD AUTO: 3.97 M/UL (ref 3.8–5.2)
RBC MORPH BLD: ABNORMAL
SODIUM SERPL-SCNC: 138 MMOL/L (ref 136–145)
WBC # BLD AUTO: 5.6 K/UL (ref 3.6–11)

## 2020-09-04 PROCEDURE — G0463 HOSPITAL OUTPT CLINIC VISIT: HCPCS | Performed by: NURSE PRACTITIONER

## 2020-09-04 PROCEDURE — 99214 OFFICE O/P EST MOD 30 MIN: CPT | Performed by: NURSE PRACTITIONER

## 2020-09-04 NOTE — PROGRESS NOTES
Chief Complaint   Patient presents with    Follow-up     Visit Vitals  /62 (BP 1 Location: Left arm, BP Patient Position: Sitting)   Pulse 92   Temp 97.2 °F (36.2 °C) (Temporal)   Resp 16   Ht 5' 2\" (1.575 m)   Wt 200 lb 9.6 oz (91 kg)   SpO2 94%   BMI 36.69 kg/m²     3 most recent PHQ Screens 2/10/2020   Little interest or pleasure in doing things Not at all   Feeling down, depressed, irritable, or hopeless Not at all   Total Score PHQ 2 0     Abuse Screening Questionnaire 10/25/2019   Do you ever feel afraid of your partner? N   Are you in a relationship with someone who physically or mentally threatens you? N   Is it safe for you to go home? Y     Learning Assessment 10/25/2019   PRIMARY LEARNER Patient   BARRIERS PRIMARY LEARNER NONE   CO-LEARNER CAREGIVER No   PRIMARY LANGUAGE ENGLISH   LEARNER PREFERENCE PRIMARY DEMONSTRATION   ANSWERED BY patient   RELATIONSHIP SELF     1. Have you been to the ER, urgent care clinic since your last visit? Hospitalized since your last visit? No    2. Have you seen or consulted any other health care providers outside of the [address] [place] [address] since your last visit? Include any pap smears or colon screening.  No

## 2020-09-04 NOTE — PROGRESS NOTES
[facility] MD Mahmood Charmain Glazier, [place], MD Lala Bollard, RICHARD Osborn, [facility]     April RUBY PerryBruno, [facility]   ANGELES Mays, [facility]       [address]    at [address], [address] [place] [address], [place]  22.    [phone]    FAX: [address] [place] [address], [address]    [phone]    FAX: [phone]       Patient Care Team:  Mary Damon MD as PCP - General (Family Medicine)  Mary Damon MD as PCP - [facility]      Problem List  Date Reviewed: 5/6/2020          Codes Class Noted    Severe obesity ([facility] [address] ICD-10-CM: E66.01  ICD-9-CM: 278.01  9/4/2020        Primary biliary cholangitis ([facility] [address] ICD-10-CM: K74.3  ICD-9-CM: 571.6  10/12/2018        Cirrhosis ([facility] [address] ICD-10-CM: K74.60  ICD-9-CM: 571.5  10/12/2018        Thrombocytopenia ([facility] [address] ICD-10-CM: D69.6  ICD-9-CM: 287.5  10/12/2018        Ascites ICD-10-CM: R18.8  ICD-9-CM: 789.59  10/12/2018              Julian Lira returns to the [address] for management of primary biliary cholangitis. The active problem list, all pertinent past medical history, medications, endoscopic studies, radiologic findings and laboratory findings related to the liver disorder were reviewed with the patient. The patient is a 76 y.o.  female who was found to have chronic liver disease, cirrhosis and PBC in 2017 when she developed ascites. Assessment of liver fibrosis was performed with Fibroscan 10/2019. The result was 13.0 kPa which correlates with Bridging fibrosis-cirrhosis. The CAP score of 256 suggests mild fatty liver.     Serologic evaluation for markers of chronic liver disease was positive for AMA. The patient is currently receiving treatment for PBC with JOLIE 1500 mg daily along with OCA 5 mg twice weekly. She is tolerating treatment with minimal side effects of itching. Since the last office visit the patient reports increased lower extremity edema and ascites. The patient has developed the following complications of cirrhosis: ascites and lower extremity edema which are well controlled on step 1 diuretic therapy. The patient notes itching. The patient has not experienced pain in the right side over the liver, dry eyes, dry mouth or dark urine. The patient completes all daily activities without any functional limitations. 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 The diagnosis of cirrhosis is based upon laboratory studies, imaging and complications of cirrhosis. Elastography performed 10/2019 suggests stage 3-4 fibrosis. The CTP is 6. Child class A. The MELD score is 10. Primary biliary cholangitis  The diagnosis is based upon serology and an elevation in ALP. Serologic testing for causes of chronic liver disease were ordered. These were positive for ASMA and AMA. A liver biopsy has not been performed. The patient is being treated with JOLIE 1500 mg daily . She is tolerating the medication well without side effects. The patient has had persistent elevation in ALP despite being on JOLIE. She was as started on OCA in 6/2019 at a dose of 5 mg Qweek  She has tolerated the medication well with only mild nausea for 1-2 days after taking the medication. The ALP continued to be elevated, OCA was increased to twice weekly. Have performed laboratory testing to monitor liver function and degree of liver injury. This included BMP, hepatic panel, CBC with platelet count and INR. Liver transaminases are normal.  ALP is elevated. Total bilirubin is mildly elevated. Serum albumin is depressed.   INR is normal.  The platelet count is depressed. Ascites   Intermittent on step 1. Will increase to step 1 1/2 (furosemide 60 mg, spironolactone 150 mg) for 1 week then decrease back to step 1. Lower extremity edema  Increased over the past 1-2 months. Will increase diuretics to step 1 1/2 for 1 week then decrease back to step 1. Screening for esophageal varices   The patient has small esophageal varices without prior bleeding. The last EGD to assess for varices was performed in 3/2019. EGD to assess for varices and need for banding will not need to be repeated until 3/2021    Hepatic encephalopathy   This has not developed to date. There is no need for treatment with lactulose and/or Xifaxan at this time. No need to restrict dietary protein at this time. Itching   This is secondary to the underlying liver disease. It is mild and the patient does not feel like she needs any medical treatment for this at this time. Essential lavender oil is helping her with this symptom. Screening for hepatocellular carcinoma  HCC screening is overdue. Last imaging with ultrasound was performed 10/2019 and did not suggest [address] The patient wishes to have an MRI instead of ultrasound. Treatment of other medical problems in patients with chronic liver disease  There are no contraindications for the patient to take any medications that are necessary for treatment of other medical issues. The patient does not consume alcohol daily. Normal doses of acetaminophen can be used for pain as needed. Normal doses of acetaminophen as recommended on the label are not hepatotoxic, even in patients with cirrhosis. The patient has cirrhosis. Patients with cirrhosis should not use NSAIDs if possible as this is associated with a higher rate of ILEANA. Counseling for alcohol in patients with chronic liver disease  The patient does not consume any significant amount of alcohol.     Thrombocytopenia   This is secondary to cirrhosis. There is no evidence of overt bleeding. No treatment is required. The platelet count is adequate for the patient to undergo procedures without the need for platelet transfusion or platelet growth factors. Osteoporosis  The risk of osteoporosis is increased in patients with PBC and cirrhosis. DEXA bone density to assess for osteoporosis has not been performed. This should be ordered by the patient's primary care physician. Vaccinations   Routine vaccinations against other bacterial and viral agents can be performed as indicated. Annual flu vaccination should be administered if indicated. ALLERGIES  Allergies   Allergen Reactions    Latex Hives    Sulfa (Sulfonamide Antibiotics) Nausea and Vomiting     MEDICATIONS  Current Outpatient Medications   Medication Sig    Ocaliva 5 mg tab TAKE 1 TABLET TWO (2) DAYS A WEEK. DOSES MUST BE 3 DAYS APART.  ursodioL (ACTIGALL) 500 mg tablet Take 3 tablets by mouth once daily    furosemide (LASIX) 40 mg tablet Take 1 Tab by mouth daily.  spironolactone (ALDACTONE) 100 mg tablet Take 1 Tab by mouth daily. No current facility-administered medications for this visit. SYSTEM REVIEW NOT RELATED TO LIVER DISEASE OR REVIEWED ABOVE:  Constitution systems: Negative for fever, chills, weight gain, weight loss. Eyes: Negative for visual changes. ENT: Negative for sore throat, painful swallowing. Respiratory: Negative for cough, hemoptysis, SOB. Cardiology: Negative for chest pain, palpitations. GI:  Negative for constipation or diarrhea. : Negative for urinary frequency, dysuria, hematuria, nocturia. Skin: Negative for rash. Hematology: Negative for easy bruising, blood clots. Musculo-skeletal: Negative for back pain, muscle pain, weakness. Neurologic: Negative for headaches, dizziness, vertigo, memory problems not related to HE. Psychology: Negative for anxiety, depression.      FAMILY HISTORY:  The patient is adopted and does not know any of her family history. SOCIAL HISTORY:  The patient is . The patient has 3 children and 15 grandchildren. The patient has never used tobacco products. The patient has never consumed significant amounts of alcohol. The patient used to work as . LABORATORY STUDIES:  Liver [place] [address] & Units 9/4/2020 2/10/2020   WBC 3.6 - 11.0 K/uL 5.6 4.1   ANC 1.8 - 8.0 K/UL 3.9 2.5   HGB 11.5 - 16.0 g/dL 13.9 14.5    - 400 K/uL 122 (L) 133 (L)   INR 0.9 - 1.1   1.1 1.1   AST 15 - 37 U/L 25 23   ALT 12 - 78 U/L 16 12   Alk Phos 45 - 117 U/L 246 (H) 223 (H)   Bili, Total 0.2 - 1.0 MG/DL 1.8 (H) 1.6 (H)   Bili, Direct 0.0 - 0.2 MG/DL 0.8 (H) 0.84 (H)   Albumin 3.5 - 5.0 g/dL 3.2 (L) 3.3 (L)   BUN 6 - 20 MG/DL 16 14   Creat 0.55 - 1.02 MG/DL 1.04 (H) 1.03 (H)   Na 136 - 145 mmol/L 138 135   K 3.5 - 5.1 mmol/L 4.1 4.5   Cl 97 - 108 mmol/L 107 99   CO2 21 - 32 mmol/L 24 23   Glucose 65 - 100 mg/dL 109 (H) 104 (H)   Magnesium 1.6 - 2.3 mg/dL  2.2     Liver [place] [address] Ref Rng & Units 10/25/2019   WBC 3.6 - 11.0 K/uL 4.7   ANC 1.8 - 8.0 K/UL 3.0   HGB 11.5 - 16.0 g/dL 13.0    - 400 K/uL 121 (L)   INR 0.9 - 1.1   1.1   AST 15 - 37 U/L 22   ALT 12 - 78 U/L 12   Alk Phos 45 - 117 U/L 207 (H)   Bili, Total 0.2 - 1.0 MG/DL 1.8 (H)   Bili, Direct 0.0 - 0.2 MG/DL 0.70 (H)   Albumin 3.5 - 5.0 g/dL 3.1 (L)   BUN 6 - 20 MG/DL 16   Creat 0.55 - 1.02 MG/DL 0.99   Na 136 - 145 mmol/L 140   K 3.5 - 5.1 mmol/L 3.8   Cl 97 - 108 mmol/L 105   CO2 21 - 32 mmol/L 21   Glucose 65 - 100 mg/dL 96   Magnesium 1.6 - 2.3 mg/dL      Cancer Screening Latest Ref Rng & Units 10/25/2019 5/28/2019 11/14/2018   AFP, Serum 0.0 - 8.0 ng/mL 3.9 3.9 5.0   AFP-L3% 0.0 - 9.9 % Comment Comment 6.3     Repeat testing ordered today. Will follow up when available. SEROLOGIES:  8/2018.   HBsurface antigen negative, anti-HBcore total negative, anti-HBsurface negative, anti-HCV negative, NEYMAR negative    Serologies Latest Ref Rng & Units 10/12/2018   NEYMAR, IFA  Negative   ASMCA 0 - 19 Units 73 (H)   M2 Ab 0.0 - 20.0 Units 25.2 (H)     LIVER HISTOLOGY:  10/2019. FibroScan performed at [address]. EkPa was 13.0. IQR/med 18%. . The results suggested a fibrosis level of F3-4. The CAP score suggests mild fatty liver. ENDOSCOPIC PROCEDURES:  3/2017. Colonoscopy by Dr Josué Catalan. No polyps. 3/2017. EGD by Dr Josué Catalan. Grade 1 esophageal varices. Mild portal gastropathy. 3/2019 Small esophageal varices. No banding performed. Mild portal hypertensive gastropathy. Gastritis of the antrum. No gastric varices identified. RADIOLOGY:  10/2018. Ultrasound of liver. Echogenic consistent with cirrhosis. No liver mass lesions. No dilated bile ducts. No ascites. 6/2019. Ultrasound of liver. Echogenic consistent with cirrhosis. No liver mass lesions. No dilated bile ducts. No ascites. OTHER TESTING:  Not available or performed    FOLLOW-UP:  All of the issues listed above in the Assessment and Plan were discussed with the patient. All questions were answered. The patient expressed a clear understanding of the above. [facility] in 3 months. April CHENCHO [facility] [address] [address] Amari Bales, [address] [facility].  [facility]

## 2020-09-09 LAB
AFP L3 MFR SERPL: NORMAL % (ref 0–9.9)
AFP SERPL-MCNC: 4.2 NG/ML (ref 0–8)

## 2020-09-11 NOTE — PROGRESS NOTES
Called patient with blood work results which are essentially unchanged. We will see her back in 3 months.

## 2020-09-23 ENCOUNTER — TELEPHONE (OUTPATIENT)
Dept: HEMATOLOGY | Age: 75
End: 2020-09-23

## 2020-09-23 DIAGNOSIS — M81.0 AGE-RELATED OSTEOPOROSIS WITHOUT CURRENT PATHOLOGICAL FRACTURE: ICD-10-CM

## 2020-09-23 DIAGNOSIS — K74.3 PRIMARY BILIARY CHOLANGITIS (HCC): Primary | ICD-10-CM

## 2020-09-23 NOTE — TELEPHONE ENCOUNTER
Called, left vm in regards to faxing imaging orders to [place] for pt to return call to office.     Faxed 09/23 @ [phone]

## 2020-09-24 NOTE — TELEPHONE ENCOUNTER
Two pt identifiers confirmed. Informed pt that imaging orders were mailed to [place] to be completed. Pt verbalized understanding of information discussed w/ no further questions at this time.

## 2020-09-28 DIAGNOSIS — K74.3 PRIMARY BILIARY CHOLANGITIS (HCC): ICD-10-CM

## 2020-09-28 NOTE — TELEPHONE ENCOUNTER
Patient states she is down to two pills of ocaliva and requests refill    Bon density test at [facility] imaging is on Thursday and MRI is on 10.19.20.

## 2020-09-29 RX ORDER — OBETICHOLIC ACID 5 MG/1
TABLET, FILM COATED ORAL
Qty: 8 TAB | Refills: 5 | Status: SHIPPED | OUTPATIENT
Start: 2020-09-29 | End: 2021-02-21

## 2020-12-29 ENCOUNTER — TELEPHONE (OUTPATIENT)
Dept: HEMATOLOGY | Age: 75
End: 2020-12-29

## 2020-12-29 NOTE — TELEPHONE ENCOUNTER
Two pt identifiers confirmed. Rico Goodwin (Pharmacist) called to check on the status of PA for Ocaliva 5mg. Informed Rico Goodwin will resubmit PA. Rico Goodwin verbalized understanding of information discussed w/ no further questions at this time. Rachel Mast Page: [id number] - PA Case ID: [id number] Need help?  Call us at [phone]   Status   Sent to [facility]   DrugOcaliva 5MG tablets   [facility] Electronic Prior Authorization Form (2017 NCPDP)

## 2020-12-29 NOTE — TELEPHONE ENCOUNTER
Satish Rubin (Key: [id number]) - [id number]  Ocaliva 5MG tablets  Status: PA Response - Approved    Two pt identifiers confirmed. Informed Pharmacy of PA approval.     Pharmacy verbalized understanding of information discussed w/ no further questions at this time.

## 2021-02-01 ENCOUNTER — OFFICE VISIT (OUTPATIENT)
Dept: HEMATOLOGY | Age: 76
End: 2021-02-01
Payer: MEDICARE

## 2021-02-01 VITALS
DIASTOLIC BLOOD PRESSURE: 66 MMHG | HEART RATE: 78 BPM | TEMPERATURE: 98.2 F | WEIGHT: 199.6 LBS | OXYGEN SATURATION: 97 % | HEIGHT: 62 IN | RESPIRATION RATE: 20 BRPM | BODY MASS INDEX: 36.73 KG/M2 | SYSTOLIC BLOOD PRESSURE: 161 MMHG

## 2021-02-01 DIAGNOSIS — K74.69 OTHER CIRRHOSIS OF LIVER (HCC): ICD-10-CM

## 2021-02-01 DIAGNOSIS — K74.3 PRIMARY BILIARY CHOLANGITIS (HCC): Primary | ICD-10-CM

## 2021-02-01 PROCEDURE — G8427 DOCREV CUR MEDS BY ELIG CLIN: HCPCS | Performed by: NURSE PRACTITIONER

## 2021-02-01 PROCEDURE — G8400 PT W/DXA NO RESULTS DOC: HCPCS | Performed by: NURSE PRACTITIONER

## 2021-02-01 PROCEDURE — G8536 NO DOC ELDER MAL SCRN: HCPCS | Performed by: NURSE PRACTITIONER

## 2021-02-01 PROCEDURE — 99214 OFFICE O/P EST MOD 30 MIN: CPT | Performed by: NURSE PRACTITIONER

## 2021-02-01 PROCEDURE — 1090F PRES/ABSN URINE INCON ASSESS: CPT | Performed by: NURSE PRACTITIONER

## 2021-02-01 PROCEDURE — 3017F COLORECTAL CA SCREEN DOC REV: CPT | Performed by: NURSE PRACTITIONER

## 2021-02-01 PROCEDURE — G8417 CALC BMI ABV UP PARAM F/U: HCPCS | Performed by: NURSE PRACTITIONER

## 2021-02-01 PROCEDURE — G8432 DEP SCR NOT DOC, RNG: HCPCS | Performed by: NURSE PRACTITIONER

## 2021-02-01 PROCEDURE — 1101F PT FALLS ASSESS-DOCD LE1/YR: CPT | Performed by: NURSE PRACTITIONER

## 2021-02-01 NOTE — PROGRESS NOTES
Identified pt with two pt identifiers(name and ). Reviewed record in preparation for visit and have obtained necessary documentation. Chief Complaint   Patient presents with    Cirrhosis Of Liver    Other     ASCITES    Other     PBC      Vitals:    21 1246   BP: (!) 161/66   Pulse: 78   Resp: 20   Temp: 98.2 °F (36.8 °C)   TempSrc: Temporal   SpO2: 97%   Weight: 199 lb 9.6 oz (90.5 kg)   Height: 5' 2\" (1.575 m)   PainSc:   4   PainLoc: Leg       Health Maintenance Review: Patient reminded of \"due or due soon\" health maintenance. I have asked the patient to contact his/her primary care provider (PCP) for follow-up on his/her health maintenance. Coordination of Care Questionnaire:  :   1) Have you been to an emergency room, urgent care, or hospitalized since your last visit? If yes, where when, and reason for visit? no       2. Have seen or consulted any other health care provider since your last visit? If yes, where when, and reason for visit? NO      Patient is accompanied by self I have received verbal consent from Sam Pulliam to discuss any/all medical information while they are present in the room.

## 2021-02-01 NOTE — PROGRESS NOTES
[address] [place] [address], MD, Delcia Nova, Reyes Blossom, MD Tanner Monks, PAPORFIRIO Ogden, [facility]     Lori Carr, [facility]   THAO PascualP-HUMBLE Berumen, [facility]       Fei Pollard [facility]    at [address], [facility] Mal Barajas  22. [phone]    FAX: [address] [place] [address], [address]    [phone]    FAX: [phone]       Patient Care Team:  Mic Silveira MD as PCP - General (Family Medicine)  Mic Silveira MD as PCP - [facility]      Problem List  Date Reviewed: 9/8/2020          Codes Class Noted    Severe obesity ([facility] ICD-10-CM: E66.01  ICD-9-CM: 278.01  9/4/2020        Primary biliary cholangitis ([facility] ICD-10-CM: K74.3  ICD-9-CM: 571.6  10/12/2018        Cirrhosis ([facility] ICD-10-CM: K74.60  ICD-9-CM: 571.5  10/12/2018        Thrombocytopenia ([facility] ICD-10-CM: D69.6  ICD-9-CM: 287.5  10/12/2018        Ascites ICD-10-CM: R18.8  ICD-9-CM: 789.59  10/12/2018              Verónica Rachel [facility] primary biliary cholangitis. The active problem list, all pertinent past medical history, medications, endoscopic studies, radiologic findings and laboratory findings related to the liver disorder were reviewed with the patient. The patient is a 76 y.o.  female who was found to have chronic liver disease, cirrhosis and PBC in 2017 when she developed ascites. Assessment of liver fibrosis was performed with Fibroscan 10/2019. The result was 13.0 kPa which correlates with Bridging fibrosis-cirrhosis. The CAP score of 256 suggests mild fatty liver.     Serologic evaluation for markers of chronic liver disease was positive for AMA. The patient is currently receiving treatment for PBC with JOLIE 1500 mg daily along with OCA 5 mg twice weekly. She is tolerating treatment with minimal side effects of itching. Since the last office visit the patient reports feeling well overall with no ascites or edema. Itching is manageable. She recently sustained an injury to the left hip flexor. This is causing limited mobility. The patient has developed the following complications of cirrhosis: ascites and lower extremity edema which are well controlled on step 1 diuretic therapy. The patient notes itching. The patient has not experienced pain in the right side over the liver, dry eyes, dry mouth or dark urine. The patient completes all daily activities without any functional limitations. 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 The diagnosis of cirrhosis is based upon laboratory studies, imaging and complications of cirrhosis. Elastography performed 10/2019 suggests stage 3-4 fibrosis. The CTP is 7. Child class B The MELD score is 10. Primary biliary cholangitis  The diagnosis is based upon serology and an elevation in ALP. Serologic testing for causes of chronic liver disease were ordered. These were positive for ASMA and AMA. A liver biopsy has not been performed. The patient is being treated with JOLIE 1500 mg daily . She is tolerating the medication well without side effects. The patient has had persistent elevation in ALP despite being on JOLIE. She was as started on OCA in 6/2019 at a dose of 5 mg Qweek  She has tolerated the medication well with only mild nausea for 1-2 days after taking the medication. The ALP continued to be elevated, OCA was increased to twice weekly. Have performed laboratory testing to monitor liver function and degree of liver injury. This included BMP, hepatic panel, CBC with platelet count and INR.     Liver transaminases are normal.  ALP is elevated. Total bilirubin is mildly elevated. Serum albumin is depressed. INR is normal.  The platelet count is depressed. Ascites   Intermittent on step 1. Continue on step 1 diuretic therapy (furosemide 40 mg, spironolactone 100 mg). Lower extremity edema  Continue on step 1 diuretic therapy (furosemide 40 mg, spironolactone 100 mg)    Screening for esophageal varices   The patient has small esophageal varices without prior bleeding. The last EGD to assess for varices was performed in 3/2019. EGD to assess for varices will be scheduled. Hepatic encephalopathy   This has not developed to date. There is no need for treatment with lactulose and/or Xifaxan at this time. No need to restrict dietary protein at this time. Itching   This is secondary to the underlying liver disease. It is mild and the patient does not feel like she needs any medical treatment for this at this time. Essential lavender oil is helping her with this symptom. Screening for hepatocellular carcinoma  HCC screening was recently performed with MRI 9/2020 and was negative for [address] AFP was also normal. Next imaging will be due 3/2021. Treatment of other medical problems in patients with chronic liver disease  There are no contraindications for the patient to take any medications that are necessary for treatment of other medical issues. The patient does not consume alcohol daily. Normal doses of acetaminophen can be used for pain as needed. Normal doses of acetaminophen as recommended on the label are not hepatotoxic, even in patients with cirrhosis. The patient has cirrhosis. Patients with cirrhosis should not use NSAIDs if possible as this is associated with a higher rate of ILEANA. Counseling for alcohol in patients with chronic liver disease  The patient does not consume any significant amount of alcohol. Thrombocytopenia   This is secondary to cirrhosis.   There is no evidence of overt bleeding. No treatment is required. The platelet count is adequate for the patient to undergo procedures without the need for platelet transfusion or platelet growth factors. Osteoporosis  The risk of osteoporosis is increased in patients with PBC and cirrhosis. DEXA bone density to assess for osteoporosis has not been performed. This should be ordered by the patient's primary care physician. Vaccinations   Routine vaccinations against other bacterial and viral agents can be performed as indicated. Annual flu vaccination should be administered if indicated. ALLERGIES  Allergies   Allergen Reactions    Latex Hives    Sulfa (Sulfonamide Antibiotics) Nausea and Vomiting     MEDICATIONS  Current Outpatient Medications   Medication Sig    Ocaliva 5 mg tab TAKE 1 TABLET TWO (2) DAYS A WEEK. DOSES MUST BE 3 DAYS APART.  ursodioL (ACTIGALL) 500 mg tablet Take 3 tablets by mouth once daily    furosemide (LASIX) 40 mg tablet Take 1 Tab by mouth daily.  spironolactone (ALDACTONE) 100 mg tablet Take 1 Tab by mouth daily. No current facility-administered medications for this visit. SYSTEM REVIEW NOT RELATED TO LIVER DISEASE OR REVIEWED ABOVE:  Constitution systems: Negative for fever, chills, weight gain, weight loss. Eyes: Negative for visual changes. ENT: Negative for sore throat, painful swallowing. Respiratory: Negative for cough, hemoptysis, SOB. Cardiology: Negative for chest pain, palpitations. GI:  Negative for constipation or diarrhea. : Negative for urinary frequency, dysuria, hematuria, nocturia. Skin: Negative for rash. Hematology: Negative for easy bruising, blood clots. Musculo-skeletal: Negative for back pain, muscle pain, weakness. Neurologic: Negative for headaches, dizziness, vertigo, memory problems not related to HE. Psychology: Negative for anxiety, depression.      FAMILY HISTORY:  The patient is adopted and does not know any of her family history. SOCIAL HISTORY:  The patient is . The patient has 3 children and 15 grandchildren. The patient has never used tobacco products. The patient has never consumed significant amounts of alcohol. The patient used to work as . LABORATORY STUDIES:  Liver [address] Units 9/4/2020 2/10/2020   WBC 3.6 - 11.0 K/uL 5.6 4.1   ANC 1.8 - 8.0 K/UL 3.9 2.5   HGB 11.5 - 16.0 g/dL 13.9 14.5    - 400 K/uL 122 (L) 133 (L)   INR 0.9 - 1.1   1.1 1.1   AST 15 - 37 U/L 25 23   ALT 12 - 78 U/L 16 12   Alk Phos 45 - 117 U/L 246 (H) 223 (H)   Bili, Total 0.2 - 1.0 MG/DL 1.8 (H) 1.6 (H)   Bili, Direct 0.0 - 0.2 MG/DL 0.8 (H) 0.84 (H)   Albumin 3.5 - 5.0 g/dL 3.2 (L) 3.3 (L)   BUN 6 - 20 MG/DL 16 14   Creat 0.55 - 1.02 MG/DL 1.04 (H) 1.03 (H)   Na 136 - 145 mmol/L 138 135   K 3.5 - 5.1 mmol/L 4.1 4.5   Cl 97 - 108 mmol/L 107 99   CO2 21 - 32 mmol/L 24 23   Glucose 65 - 100 mg/dL 109 (H) 104 (H)   Magnesium 1.6 - 2.3 mg/dL  2.2     Liver [place] [address] Ref Rng & Units 10/25/2019   WBC 3.6 - 11.0 K/uL 4.7   ANC 1.8 - 8.0 K/UL 3.0   HGB 11.5 - 16.0 g/dL 13.0    - 400 K/uL 121 (L)   INR 0.9 - 1.1   1.1   AST 15 - 37 U/L 22   ALT 12 - 78 U/L 12   Alk Phos 45 - 117 U/L 207 (H)   Bili, Total 0.2 - 1.0 MG/DL 1.8 (H)   Bili, Direct 0.0 - 0.2 MG/DL 0.70 (H)   Albumin 3.5 - 5.0 g/dL 3.1 (L)   BUN 6 - 20 MG/DL 16   Creat 0.55 - 1.02 MG/DL 0.99   Na 136 - 145 mmol/L 140   K 3.5 - 5.1 mmol/L 3.8   Cl 97 - 108 mmol/L 105   CO2 21 - 32 mmol/L 21   Glucose 65 - 100 mg/dL 96   Magnesium 1.6 - 2.3 mg/dL      Cancer Screening Latest Ref Rng & Units 9/4/2020 10/25/2019 5/28/2019   AFP, Serum 0.0 - 8.0 ng/mL 4.2 3.9 3.9   AFP-L3% 0.0 - 9.9 % Comment Comment Comment     Repeat testing ordered today. Will follow up when available. SEROLOGIES:  8/2018.   HBsurface antigen negative, anti-HBcore total negative, anti-HBsurface negative, anti-HCV negative, NEYMAR negative    Serologies Latest Ref Rng & Units 10/12/2018   NEYMAR, IFA  Negative   ASMCA 0 - 19 Units 73 (H)   M2 Ab 0.0 - 20.0 Units 25.2 (H)     LIVER HISTOLOGY:  10/2019. FibroScan performed at [address]. EkPa was 13.0. IQR/med 18%. . The results suggested a fibrosis level of F3-4. The CAP score suggests mild fatty liver. ENDOSCOPIC PROCEDURES:  3/2017. Colonoscopy by Dr Aneta Fletcher. No polyps. 3/2017. EGD by Dr Aneta Fletcher. Grade 1 esophageal varices. Mild portal gastropathy. 3/2019 Small esophageal varices. No banding performed. Mild portal hypertensive gastropathy. Gastritis of the antrum. No gastric varices identified. RADIOLOGY:  10/2018. Ultrasound of liver. Echogenic consistent with cirrhosis. No liver mass lesions. No dilated bile ducts. No ascites. 6/2019. Ultrasound of liver. Echogenic consistent with cirrhosis. No liver mass lesions. No dilated bile ducts. No ascites. 9/2020. MRI of liver. Cirrhotic appearing liver. CBD 9 mm without stone. No liver mass or lesion. No ascites. OTHER TESTING:  Not available or performed    FOLLOW-UP:  All of the issues listed above in the Assessment and Plan were discussed with the patient. All questions were answered. The patient expressed a clear understanding of the above. [facility] 87 in 3 months. April S.  CHENCHO Carr-BC  [address] of [address] [facility] [address] [address] Amari Bales, 2000 [facility] 22.  201 [facility]

## 2021-02-02 LAB
ALBUMIN SERPL-MCNC: 3.2 G/DL (ref 3.7–4.7)
ALP SERPL-CCNC: 190 IU/L (ref 39–117)
ALT SERPL-CCNC: 13 IU/L (ref 0–32)
AST SERPL-CCNC: 27 IU/L (ref 0–40)
BASOPHILS # BLD AUTO: 0 X10E3/UL (ref 0–0.2)
BASOPHILS NFR BLD AUTO: 1 %
BILIRUB DIRECT SERPL-MCNC: 0.78 MG/DL (ref 0–0.4)
BILIRUB SERPL-MCNC: 2.4 MG/DL (ref 0–1.2)
BUN SERPL-MCNC: 12 MG/DL (ref 8–27)
BUN/CREAT SERPL: 16 (ref 12–28)
CALCIUM SERPL-MCNC: 9 MG/DL (ref 8.7–10.3)
CHLORIDE SERPL-SCNC: 110 MMOL/L (ref 96–106)
CO2 SERPL-SCNC: 20 MMOL/L (ref 20–29)
CREAT SERPL-MCNC: 0.74 MG/DL (ref 0.57–1)
EOSINOPHIL # BLD AUTO: 0.1 X10E3/UL (ref 0–0.4)
EOSINOPHIL NFR BLD AUTO: 2 %
ERYTHROCYTE [DISTWIDTH] IN BLOOD BY AUTOMATED COUNT: 12.7 % (ref 11.7–15.4)
GLUCOSE SERPL-MCNC: 97 MG/DL (ref 65–99)
HCT VFR BLD AUTO: 42.1 % (ref 34–46.6)
HGB BLD-MCNC: 15 G/DL (ref 11.1–15.9)
IMM GRANULOCYTES # BLD AUTO: 0 X10E3/UL (ref 0–0.1)
IMM GRANULOCYTES NFR BLD AUTO: 0 %
INR PPP: 1.1 (ref 0.9–1.2)
LYMPHOCYTES # BLD AUTO: 0.7 X10E3/UL (ref 0.7–3.1)
LYMPHOCYTES NFR BLD AUTO: 16 %
MCH RBC QN AUTO: 35 PG (ref 26.6–33)
MCHC RBC AUTO-ENTMCNC: 35.6 G/DL (ref 31.5–35.7)
MCV RBC AUTO: 98 FL (ref 79–97)
MONOCYTES # BLD AUTO: 0.5 X10E3/UL (ref 0.1–0.9)
MONOCYTES NFR BLD AUTO: 12 %
NEUTROPHILS # BLD AUTO: 2.9 X10E3/UL (ref 1.4–7)
NEUTROPHILS NFR BLD AUTO: 69 %
PLATELET # BLD AUTO: 103 X10E3/UL (ref 150–450)
POTASSIUM SERPL-SCNC: 4 MMOL/L (ref 3.5–5.2)
PROT SERPL-MCNC: 7.5 G/DL (ref 6–8.5)
PROTHROMBIN TIME: 12 SEC (ref 9.1–12)
RBC # BLD AUTO: 4.28 X10E6/UL (ref 3.77–5.28)
SODIUM SERPL-SCNC: 142 MMOL/L (ref 134–144)
WBC # BLD AUTO: 4.2 X10E3/UL (ref 3.4–10.8)

## 2021-02-02 NOTE — PROGRESS NOTES
Letter sent to the patient regarding the blood work results.  The liver numbers improved slightly and the other blood work was normal.

## 2021-02-20 DIAGNOSIS — K74.3 PRIMARY BILIARY CHOLANGITIS (HCC): ICD-10-CM

## 2021-02-21 RX ORDER — OBETICHOLIC ACID 5 MG/1
TABLET, FILM COATED ORAL
Qty: 8 TAB | Refills: 5 | Status: SHIPPED | OUTPATIENT
Start: 2021-02-21 | End: 2021-07-19

## 2021-04-02 ENCOUNTER — TRANSCRIBE ORDER (OUTPATIENT)
Dept: REGISTRATION | Age: 76
End: 2021-04-02

## 2021-04-02 ENCOUNTER — HOSPITAL ENCOUNTER (OUTPATIENT)
Dept: PREADMISSION TESTING | Age: 76
Discharge: HOME | End: 2021-04-02
Payer: MEDICARE

## 2021-04-02 DIAGNOSIS — Z01.812 PRE-PROCEDURE LAB EXAM: Primary | ICD-10-CM

## 2021-04-02 DIAGNOSIS — Z01.812 PRE-PROCEDURE LAB EXAM: ICD-10-CM

## 2021-04-02 PROCEDURE — U0003 INFECTIOUS AGENT DETECTION BY NUCLEIC ACID (DNA OR RNA); SEVERE ACUTE RESPIRATORY SYNDROME CORONAVIRUS 2 (SARS-COV-2) (CORONAVIRUS DISEASE [COVID-19]), AMPLIFIED PROBE TECHNIQUE, MAKING USE OF HIGH THROUGHPUT TECHNOLOGIES AS DESCRIBED BY CMS-2020-01-R: HCPCS

## 2021-04-03 LAB — SARS-COV-2, COV2NT: NOT DETECTED

## 2021-04-08 ENCOUNTER — HOSPITAL ENCOUNTER (OUTPATIENT)
Age: 76
Setting detail: OUTPATIENT SURGERY
Discharge: HOME | End: 2021-04-08
Attending: INTERNAL MEDICINE | Admitting: INTERNAL MEDICINE
Payer: MEDICARE

## 2021-04-08 ENCOUNTER — ANESTHESIA EVENT (OUTPATIENT)
Dept: ENDOSCOPY | Age: 76
End: 2021-04-08
Payer: MEDICARE

## 2021-04-08 ENCOUNTER — ANESTHESIA (OUTPATIENT)
Dept: ENDOSCOPY | Age: 76
End: 2021-04-08
Payer: MEDICARE

## 2021-04-08 VITALS
SYSTOLIC BLOOD PRESSURE: 114 MMHG | HEART RATE: 72 BPM | HEIGHT: 62 IN | WEIGHT: 200 LBS | DIASTOLIC BLOOD PRESSURE: 66 MMHG | RESPIRATION RATE: 25 BRPM | TEMPERATURE: 97.3 F | OXYGEN SATURATION: 95 % | BODY MASS INDEX: 36.8 KG/M2

## 2021-04-08 DIAGNOSIS — I85.10 SECONDARY ESOPHAGEAL VARICES WITHOUT BLEEDING (HCC): ICD-10-CM

## 2021-04-08 PROCEDURE — 74011000250 HC RX REV CODE- 250: Performed by: NURSE ANESTHETIST, CERTIFIED REGISTERED

## 2021-04-08 PROCEDURE — 74011000258 HC RX REV CODE- 258: Performed by: INTERNAL MEDICINE

## 2021-04-08 PROCEDURE — 43235 EGD DIAGNOSTIC BRUSH WASH: CPT | Performed by: INTERNAL MEDICINE

## 2021-04-08 PROCEDURE — 76060000031 HC ANESTHESIA FIRST 0.5 HR: Performed by: INTERNAL MEDICINE

## 2021-04-08 PROCEDURE — 76040000019: Performed by: INTERNAL MEDICINE

## 2021-04-08 PROCEDURE — 2709999900 HC NON-CHARGEABLE SUPPLY: Performed by: INTERNAL MEDICINE

## 2021-04-08 PROCEDURE — 74011250636 HC RX REV CODE- 250/636: Performed by: NURSE ANESTHETIST, CERTIFIED REGISTERED

## 2021-04-08 RX ORDER — SODIUM CHLORIDE 0.9 % (FLUSH) 0.9 %
5-40 SYRINGE (ML) INJECTION AS NEEDED
Status: DISCONTINUED | OUTPATIENT
Start: 2021-04-08 | End: 2021-04-08 | Stop reason: HOSPADM

## 2021-04-08 RX ORDER — FLUMAZENIL 0.1 MG/ML
0.2 INJECTION INTRAVENOUS
Status: DISCONTINUED | OUTPATIENT
Start: 2021-04-08 | End: 2021-04-08 | Stop reason: HOSPADM

## 2021-04-08 RX ORDER — SODIUM CHLORIDE 9 MG/ML
INJECTION, SOLUTION INTRAVENOUS
Status: DISCONTINUED | OUTPATIENT
Start: 2021-04-08 | End: 2021-04-08 | Stop reason: HOSPADM

## 2021-04-08 RX ORDER — DEXTROMETHORPHAN/PSEUDOEPHED 2.5-7.5/.8
1.2 DROPS ORAL
Status: DISCONTINUED | OUTPATIENT
Start: 2021-04-08 | End: 2021-04-08 | Stop reason: HOSPADM

## 2021-04-08 RX ORDER — SODIUM CHLORIDE 0.9 % (FLUSH) 0.9 %
5-40 SYRINGE (ML) INJECTION EVERY 8 HOURS
Status: DISCONTINUED | OUTPATIENT
Start: 2021-04-08 | End: 2021-04-08 | Stop reason: HOSPADM

## 2021-04-08 RX ORDER — ATROPINE SULFATE 0.1 MG/ML
0.5 INJECTION INTRAVENOUS
Status: DISCONTINUED | OUTPATIENT
Start: 2021-04-08 | End: 2021-04-08 | Stop reason: HOSPADM

## 2021-04-08 RX ORDER — NALOXONE HYDROCHLORIDE 0.4 MG/ML
0.4 INJECTION, SOLUTION INTRAMUSCULAR; INTRAVENOUS; SUBCUTANEOUS
Status: DISCONTINUED | OUTPATIENT
Start: 2021-04-08 | End: 2021-04-08 | Stop reason: HOSPADM

## 2021-04-08 RX ORDER — LIDOCAINE HYDROCHLORIDE 20 MG/ML
INJECTION, SOLUTION INFILTRATION; PERINEURAL AS NEEDED
Status: DISCONTINUED | OUTPATIENT
Start: 2021-04-08 | End: 2021-04-08 | Stop reason: HOSPADM

## 2021-04-08 RX ORDER — SODIUM CHLORIDE 9 MG/ML
50 INJECTION, SOLUTION INTRAVENOUS CONTINUOUS
Status: DISCONTINUED | OUTPATIENT
Start: 2021-04-08 | End: 2021-04-08 | Stop reason: HOSPADM

## 2021-04-08 RX ORDER — EPINEPHRINE 0.1 MG/ML
1 INJECTION INTRACARDIAC; INTRAVENOUS
Status: DISCONTINUED | OUTPATIENT
Start: 2021-04-08 | End: 2021-04-08 | Stop reason: HOSPADM

## 2021-04-08 RX ORDER — FENTANYL CITRATE 50 UG/ML
50-200 INJECTION, SOLUTION INTRAMUSCULAR; INTRAVENOUS
Status: DISCONTINUED | OUTPATIENT
Start: 2021-04-08 | End: 2021-04-08 | Stop reason: HOSPADM

## 2021-04-08 RX ORDER — MIDAZOLAM HYDROCHLORIDE 1 MG/ML
5-10 INJECTION, SOLUTION INTRAMUSCULAR; INTRAVENOUS
Status: DISCONTINUED | OUTPATIENT
Start: 2021-04-08 | End: 2021-04-08 | Stop reason: HOSPADM

## 2021-04-08 RX ORDER — PROPOFOL 10 MG/ML
INJECTION, EMULSION INTRAVENOUS AS NEEDED
Status: DISCONTINUED | OUTPATIENT
Start: 2021-04-08 | End: 2021-04-08 | Stop reason: HOSPADM

## 2021-04-08 RX ADMIN — LIDOCAINE HYDROCHLORIDE 50 MG: 20 INJECTION, SOLUTION INFILTRATION; PERINEURAL at 15:45

## 2021-04-08 RX ADMIN — PROPOFOL 100 MG: 10 INJECTION, EMULSION INTRAVENOUS at 15:45

## 2021-04-08 RX ADMIN — SODIUM CHLORIDE: 900 INJECTION, SOLUTION INTRAVENOUS at 15:34

## 2021-04-08 RX ADMIN — PROPOFOL 50 MG: 10 INJECTION, EMULSION INTRAVENOUS at 15:47

## 2021-04-08 RX ADMIN — PROPOFOL 50 MG: 10 INJECTION, EMULSION INTRAVENOUS at 15:48

## 2021-04-08 NOTE — PROCEDURES
Edison Nagel MD, Pito Campbell MD, MPH      Nolvia Hanna, PAPORFIRIO Lovelace, [facility]-BC     Lori Carr, [facility]   Sanam Suarez JARED-C    Lisa Johnston, [facility]       Fei Pollard [facility] 136    at [address], [facility] Mal Barajas  22.    [phone]    FAX: [address] [place] [address], [address] [place] [address] - [address]    [phone]    FAX: [phone]         UPPER ENDOSCOPY PROCEDURE NOTE    Obed Beck  1945    INDICATION: Cirrhosis. Screening for esophageal varices with variceal ligation if  indicated. : Sebas Miguel MD    SURGICAL ASSISTANT:  None    PROSTHETIC DEVISES, TISSUE GRAFTS, ORGAN TRANSPLANTS:  Not applicable     ANESTHESIA/SEDATION: Sedation per anesthesiology      PROCEDURE DESCRIPTION:  Infomed consent was obtained from the patient for the procedure. All risks and benefits of the procedure explained. The procedure was performed in the endoscopy suite. The patient was laying on a stretcher and moved to the left lateral decubitus position prior to administration of sedation. Sedation was administered by anesthesiology. See their note for details. The endoscope was inserted into the mouth and advanced under direct vision to the second portion of the duodenum. Careful inspection of upper gastrointestinal tract was made as the endoscope was inserted and withdrawn. Retroflexion of the endoscope to view of the cardia of the stomach was performed. The findings and interventions are described below. FINDINGS:  Esophagus:    Small esophageal varices. No banding performed.     Stomach:   Normal    Duodenum:   Normal bulb and second portion    SPECIMENS COLLECTED: None    INTERVENTIONS:  None    COMPLICATIONS: None. The patient tolerated the procedure well. EBL: Negligible. RECOMMENDATIONS:  Observe until discharge parameters are achieved. May resume previous diet. Repeat endoscopy to reassess varices and need for banding in 2 years. Follow-up Liver [facility] office as scheduled.       Jd Greenfield MD  [place] [address].  [phone]  [address] [place] [address]

## 2021-04-08 NOTE — DISCHARGE INSTRUCTIONS
José Luis Bynum MD, [facility] [place] [facility] MD Alfredo, MPH      Ozzy Hung, RICHARD Mcconnell, [facility]-BC     Lori Carr, [facility]   Nikita Pelletier JARED-HUMBLE Garcia, [facility]       Fei Son De Esposito 136    at [address], [facility] Mal Barajas  22.    [phone]    FAX: [address] [place] [address], [address]    [phone]    FAX: [phone]         ENDOSCOPY DISCHARGE INSTRUCTIONS      Tatum Shicatherine  1945  Date: 4/8/2021    DISCOMFORT:  Use lozenges or warm salt water gargle for sore thoat  Apply warm compress to IV site if red. If redness or soreness persists call the office. You may experience gas and bloating. Walking and belching will help relieve this. You may experience chest discomfort or pressure especially if banding of esophageal varices was performed. DIET:  You may resume your normal diet. ACTIVITY:  Spend the remainder of the day resting. Avoid any strenuous activity. You may not operate a vehicle for 12 hours. You may not engage in an occupation involving machinery or appliances for rest of today. Avoid making any critical or financial decisions for at least 24 hour. Call the [facility] [address] if you have any of the following:  Increasing chest or abdominal pain, nausea, vomiting, vomiting blood, abdominal distension or swelling, fever or chills, bloody discharge from nose or mouth or shortness of breath. Follow-up Instructions:  Call Dr. Kee Godfrey for any questions or problems at the phone number listed above. If a biopsy was performed, you will be contacted by the office staff or Dr Kee Godfrey within 1 week.    If you have not heard from us by then you may call the office at the phone number listed above to inquire about the results. ENDOSCOPY FINDINGS:  Your endoscopy demonstrated small veins in esophagus. Will repeat EGD to look for development of varices in 2 years. Keep follow-up appointment with April on 4/21/2021. DISCHARGE SUMMARY from the Nurse: The following personal items collected during your admission are returned to you:   Dental Appliance: Dental Appliances: At bedside, Lowers, Uppers  Vision: Visual Aid: Glasses  Hearing Aid:    Jewelry:    Clothing:    Other Valuables:    Valuables sent to safe:                          Learning About Coronavirus (COVID-19)  Coronavirus (COVID-19): Overview  What is coronavirus (RDRBN-80)? The coronavirus disease (COVID-19) is caused by a virus. It is an illness that was first found in [place], [place], in December 2019. It has since spread worldwide. The virus can cause fever, cough, and trouble breathing. In severe cases, it can cause pneumonia and make it hard to breathe without help. It can cause death. Coronaviruses are a large group of viruses. They cause the common cold. They also cause more serious illnesses like Middle East respiratory syndrome (MERS) and severe acute respiratory syndrome (SARS). COVID-19 is caused by a novel coronavirus. That means it's a new type that has not been seen in people before. This virus spreads person-to-person through droplets from coughing and sneezing. It can also spread when you are close to someone who is infected. And it can spread when you touch something that has the virus on it, such as a doorknob or a tabletop. What can you do to protect yourself from coronavirus (COVID-19)? The best way to protect yourself from getting sick is to:  · Avoid areas where there is an outbreak. · Avoid contact with people who may be infected. · Wash your hands often with soap or alcohol-based hand sanitizers.   · Avoid crowds and try to stay at least 6 feet away from other people. · Wash your hands often, especially after you cough or sneeze. Use soap and water, and scrub for at least 20 seconds. If soap and water aren't available, use an alcohol-based hand . · Avoid touching your mouth, nose, and eyes. What can you do to avoid spreading the virus to others? To help avoid spreading the virus to others:  · Cover your mouth with a tissue when you cough or sneeze. Then throw the tissue in the trash. · Use a disinfectant to clean things that you touch often. · Stay home if you are sick or have been exposed to the virus. Don't go to school, work, or public areas. And don't use public transportation. · If you are sick:  ? Leave your home only if you need to get medical care. But call the doctor's office first so they know you're coming. And wear a face mask, if you have one.  ? If you have a face mask, wear it whenever you're around other people. It can help stop the spread of the virus when you cough or sneeze. ? Clean and disinfect your home every day. Use household  and disinfectant wipes or sprays. Take special care to clean things that you grab with your hands. These include doorknobs, remote controls, phones, and handles on your refrigerator and microwave. And don't forget countertops, tabletops, bathrooms, and computer keyboards. When to call for help  Call 911 anytime you think you may need emergency care. For example, call if:  · You have severe trouble breathing. (You can't talk at all.)  · You have constant chest pain or pressure. · You are severely dizzy or lightheaded. · You are confused or can't think clearly. · Your face and lips have a blue color. · You pass out (lose consciousness) or are very hard to wake up. Call your doctor now if you develop symptoms such as:  · Shortness of breath. · Fever. · Cough. If you need to get care, call ahead to the doctor's office for instructions before you go.  Make sure you wear a face mask, if you have one, to prevent exposing other people to the virus. Where can you get the latest information? The following health organizations are tracking and studying this virus. Their websites contain the most up-to-date information. Caridad Jr also learn what to do if you think you may have been exposed to the virus. · U.S. Centers for Disease Control and Prevention (CDC): The CDC provides updated news about the disease and travel advice. The website also tells you how to prevent the spread of infection. www.cdc.gov  · World Health Organization [facility]): WHO offers information about the virus outbreaks. WHO also has travel advice. www.who.int  Current as of: April 1, 2020               Content Version: 12.4  © 2006-2020 [employer]. Care instructions adapted under license by your healthcare professional. If you have questions about a medical condition or this instruction, always ask your healthcare professional. [address] any warranty or liability for your use of this information.

## 2021-04-08 NOTE — H&P
Heena Beckford MD, Beverley Deleon MD, MPH      [facility], RICHARD Delacruz [facility]     Lori Carr, [facility]   Lisa Yanez JAREDHUMBLE Maza [facility]       Fei Pollard [facility] 136    at [address]    [address] [place] [address], [facility] Mal Barajas  22.    [phone]    FAX: [address] [place] [address], [address]    [phone]    FAX: [phone]         PRE-PROCEDURE NOTE - EGD    H and P from last office visit reviewed. Allergies reviewed. Out-patient medicaton list reviewed. Patient Active Problem List   Diagnosis Code    Primary biliary cholangitis (HCC) K74.3    Cirrhosis ([facility] K74.60    Thrombocytopenia ([facility] D69.6    Ascites R18.8    Severe obesity ([facility] E66.01       Allergies   Allergen Reactions    Latex Hives    Sulfa (Sulfonamide Antibiotics) Nausea and Vomiting       No current facility-administered medications on file prior to encounter. Current Outpatient Medications on File Prior to Encounter   Medication Sig Dispense Refill    Ocaliva 5 mg tab TAKE 1 TABLET TWO DAYS A WEEK. DOSES MUST BE THREE DAYS APART. 8 Tab 5    ursodioL (ACTIGALL) 500 mg tablet Take 3 tablets by mouth once daily 270 Tab 5    furosemide (LASIX) 40 mg tablet Take 1 Tab by mouth daily. 90 Tab 3    spironolactone (ALDACTONE) 100 mg tablet Take 1 Tab by mouth daily. 90 Tab 3       For EGD to assess for esophageal and gastric varices. Plan to perform banding if indicated based upon variceal size and appearance. The risks of the procedure were discussed with the patient. These included reaction to anesthesia, pain, perforation and bleeding. All questions were answered.   The patient wishes to proceed with the procedure. The patient was counseled at length about the risks of chris Covid-19 in the rosas-operative and post-operative states including the recovery window of their procedure. The patient was made aware that chris Covid-19 after a surgical procedure may worsen their prognosis for recovering from the virus and lend to a higher morbidity and or mortality risk. The patient was given the options of postponing their procedure. All of the risks, benefits, and alternatives were discussed. The patient does  wish to proceed with the procedure. PHYSICAL EXAMINATION:  Visit Vitals  BP (!) 128/54   Pulse 74   Temp 98.6 °F (37 °C)   Resp 16   Ht 5' 2\" (1.575 m)   Wt 200 lb (90.7 kg)   SpO2 99%   Breastfeeding No   BMI 36.58 kg/m²       General: No acute distress. Eyes: Sclera anicteric. ENT: No oral lesions. Thyroid normal.  Nodes: No adenopathy. Skin: No spider angiomata. No jaundice. No palmar erythema. Respiratory: Lungs clear to auscultation. Cardiovascular: Regular heart rate. No murmurs. No JVD. Abdomen: Soft non-tender, liver size normal to percussion/palpation. Spleen not palpable. No obvious ascites. Extremities: No edema. No muscle wasting. No gross arthritic changes. Neurologic: Alert and oriented. Cranial nerves grossly intact. No asterixis. MOST RECENT LABORATORY STUDIES:  Lab Results   Component Value Date/Time    WBC 4.2 02/01/2021 02:04 PM    HGB 15.0 02/01/2021 02:04 PM    HCT 42.1 02/01/2021 02:04 PM    PLATELET 019 (L) 59/29/6701 02:04 PM    MCV 98 (H) 02/01/2021 02:04 PM     Lab Results   Component Value Date/Time    INR 1.1 02/01/2021 02:04 PM    INR 1.1 09/04/2020 02:54 PM    INR 1.1 02/10/2020 12:00 AM    Prothrombin time 12.0 02/01/2021 02:04 PM    Prothrombin time 11.2 (H) 09/04/2020 02:54 PM    Prothrombin time 11.4 02/10/2020 12:00 AM       ASSESSMENT AND PLAN:  EGD to assess for esophageal and/or gastric varices.   Sedation per anesthesiology      Fadumo Vilchis MD  [facility] of [address], [address] [facility], [facility].  [phone]  [address]

## 2021-04-08 NOTE — ANESTHESIA PREPROCEDURE EVALUATION
Relevant Problems   GASTROINTESTINAL   (+) Cirrhosis (HCC)   (+) Primary biliary cholangitis (HCC)      ENDOCRINE   (+) Severe obesity (HCC)       Anesthetic History   No history of anesthetic complications            Review of Systems / Medical History  Patient summary reviewed, nursing notes reviewed and pertinent labs reviewed    Pulmonary  Within defined limits                 Neuro/Psych   Within defined limits           Cardiovascular  Within defined limits                Exercise tolerance: <4 METS     GI/Hepatic/Renal  Within defined limits         Liver disease     Endo/Other        Obesity     Other Findings   Comments: Severe obesity  Primary biliary cholangitis  cirrhosis (HCC)   Thrombocytopenia (HCC)  Ascites                Physical Exam    Airway  Mallampati: II  TM Distance: > 6 cm  Neck ROM: normal range of motion   Mouth opening: Normal     Cardiovascular  Regular rate and rhythm,  S1 and S2 normal,  no murmur, click, rub, or gallop             Dental  No notable dental hx       Pulmonary  Breath sounds clear to auscultation               Abdominal  GI exam deferred       Other Findings            Anesthetic Plan    ASA: 3  Anesthesia type: MAC          Induction: Intravenous  Anesthetic plan and risks discussed with: Patient

## 2021-04-08 NOTE — PERIOP NOTES

## 2021-04-09 NOTE — ANESTHESIA POSTPROCEDURE EVALUATION
Post-Anesthesia Evaluation and Assessment    Patient: Prem Mars MRN: [id number]  SSN: [id number]    YOB: 1945  Age: 76 y.o. Sex: female      I have evaluated the patient and they are stable and ready for discharge from the PACU. Cardiovascular Function/Vital Signs  Visit Vitals  /66   Pulse 72   Temp 36.3 °C (97.3 °F)   Resp 25   Ht 5' 2\" (1.575 m)   Wt 90.7 kg (200 lb)   SpO2 95%   Breastfeeding No   BMI 36.58 kg/m²       Patient is status post MAC anesthesia for Procedure(s):  ESOPHAGOGASTRODUODENOSCOPY (EGD)   :-.    Nausea/Vomiting: None    Postoperative hydration reviewed and adequate. Pain:  Pain Scale 1: Numeric (0 - 10) (04/08/21 1610)  Pain Intensity 1: 0 (04/08/21 1610)   Managed    Neurological Status: At baseline    Mental Status, Level of Consciousness: Alert and  oriented to person, place, and time    Pulmonary Status:   O2 Device: Room air (04/08/21 1610)   Adequate oxygenation and airway patent    Complications related to anesthesia: None    Post-anesthesia assessment completed. No concerns    Signed By: Tamela Alex MD     April 9, 2021              Procedure(s):  ESOPHAGOGASTRODUODENOSCOPY (EGD)   :-.    MAC    <BSHSIANPOST>    INITIAL Post-op Vital signs:   Vitals Value Taken Time   /66 04/08/21 1610   Temp 36.3 °C (97.3 °F) 04/08/21 1558   Pulse 69 04/08/21 1614   Resp 34 04/08/21 1614   SpO2 94 % 04/08/21 1614   Vitals shown include unvalidated device data.

## 2021-05-17 ENCOUNTER — OFFICE VISIT (OUTPATIENT)
Dept: HEMATOLOGY | Age: 76
End: 2021-05-17
Payer: MEDICARE

## 2021-05-17 VITALS
TEMPERATURE: 97 F | BODY MASS INDEX: 37.69 KG/M2 | SYSTOLIC BLOOD PRESSURE: 137 MMHG | HEIGHT: 62 IN | DIASTOLIC BLOOD PRESSURE: 65 MMHG | RESPIRATION RATE: 16 BRPM | OXYGEN SATURATION: 98 % | WEIGHT: 204.8 LBS | HEART RATE: 72 BPM

## 2021-05-17 DIAGNOSIS — K74.3 PRIMARY BILIARY CHOLANGITIS (HCC): ICD-10-CM

## 2021-05-17 DIAGNOSIS — K74.69 OTHER CIRRHOSIS OF LIVER (HCC): Primary | ICD-10-CM

## 2021-05-17 PROCEDURE — 99214 OFFICE O/P EST MOD 30 MIN: CPT | Performed by: NURSE PRACTITIONER

## 2021-05-17 PROCEDURE — G8427 DOCREV CUR MEDS BY ELIG CLIN: HCPCS | Performed by: NURSE PRACTITIONER

## 2021-05-17 PROCEDURE — G8417 CALC BMI ABV UP PARAM F/U: HCPCS | Performed by: NURSE PRACTITIONER

## 2021-05-17 PROCEDURE — G8536 NO DOC ELDER MAL SCRN: HCPCS | Performed by: NURSE PRACTITIONER

## 2021-05-17 PROCEDURE — 1090F PRES/ABSN URINE INCON ASSESS: CPT | Performed by: NURSE PRACTITIONER

## 2021-05-17 PROCEDURE — 3017F COLORECTAL CA SCREEN DOC REV: CPT | Performed by: NURSE PRACTITIONER

## 2021-05-17 PROCEDURE — G8432 DEP SCR NOT DOC, RNG: HCPCS | Performed by: NURSE PRACTITIONER

## 2021-05-17 PROCEDURE — 1101F PT FALLS ASSESS-DOCD LE1/YR: CPT | Performed by: NURSE PRACTITIONER

## 2021-05-17 PROCEDURE — G8400 PT W/DXA NO RESULTS DOC: HCPCS | Performed by: NURSE PRACTITIONER

## 2021-05-17 NOTE — PROGRESS NOTES
Nandini Pham MD, Annell Rana, Jeri Fleeting, MD Danita Holter, RICHARD Tuttle, [facility]     Lori Carr, [facility]   Torrie Casillas JARED-HUMBLE [place], [facility]       Fei Pollard [facility] 136    at [address], [facility] Mal Barajas  22.    [phone]    FAX: [address] [place] [address], [address]    [phone]    FAX: [phone]       Patient Care Team:  Austin Izaguirre MD as PCP - General (Internal Medicine)  Baylee Herr MD as PCP - [facility]      Problem List  Date Reviewed: 4/8/2021          Codes Class Noted    Severe obesity ([facility] [address] ICD-10-CM: E66.01  ICD-9-CM: 278.01  9/4/2020        Primary biliary cholangitis ([facility] [address] ICD-10-CM: K74.3  ICD-9-CM: 571.6  10/12/2018        Cirrhosis ([facility] [address] ICD-10-CM: K74.60  ICD-9-CM: 571.5  10/12/2018        Thrombocytopenia ([address] ICD-10-CM: D69.6  ICD-9-CM: 287.5  10/12/2018        Ascites ICD-10-CM: R18.8  ICD-9-CM: 789.59  10/12/2018              Yanet Flores returns to the [address] for management of primary biliary cholangitis. The active problem list, all pertinent past medical history, medications, endoscopic studies, radiologic findings and laboratory findings related to the liver disorder were reviewed with the patient. The patient is a 76 y.o.  female who was found to have chronic liver disease, cirrhosis and PBC in 2017 when she developed ascites. Assessment of liver fibrosis was performed with Fibroscan 10/2019. The result was 13.0 kPa which correlates with Bridging fibrosis-cirrhosis. The CAP score of 256 suggests mild fatty liver.     Serologic evaluation for markers of chronic liver disease was positive for AMA. The patient is currently receiving treatment for PBC with JOLIE 1500 mg daily along with OCA 5 mg twice weekly. She is tolerating treatment with minimal side effects of itching. Since the last office visit the patient notes left knee pain that is severe at times accompanied by swelling. There has also been a noticeable amount of fatigue prompting her to nap most days. Itching has been manageable without medication. The patient has developed the following complications of cirrhosis: ascites and lower extremity edema which are well controlled on step 1 diuretic therapy. The patient notes itching. The patient has not experienced pain in the right side over the liver, dry eyes, dry mouth or dark urine. The patient completes all daily activities without any functional limitations. 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 The diagnosis of cirrhosis is based upon laboratory studies, imaging and complications of cirrhosis. Elastography performed 10/2019 suggests stage 3-4 fibrosis. Have performed laboratory testing to monitor liver function and degree of liver injury. This included BMP, hepatic panel, CBC with platelet count and INR. Laboratory testing from 2/01/2021 reviewed in detail. Follow-up testing ordered today. The liver transaminases are normal. The ALP is elevated. The liver function and platelet count are depressed. The INR is slightly prolonged. The CTP is 7. Child class B The MELD score is 10    Primary biliary cholangitis  The diagnosis is based upon serology and an elevation in ALP. Serologic testing for causes of chronic liver disease were positive for ASMA and AMA. A liver biopsy has not been performed. The patient is being treated with JOLIE 1500 mg daily along with OCA twice weekly. She is tolerating the medication well without side effects.      The patient has had persistent elevation in ALP despite being on JOLIE.  She was as started on OCA in 6/2019 at a dose of 5 mg Qweek. The ALP continued to be elevated, OCA was increased to twice weekly. We are unable to increase the dose further due to decompensated liver disease and a history of itching. She is not responding to treatment. MRI was performed 9/2020 which demonstrated an enlarged CBC of 9 mm without evidence of stone or stricture. Ascites   Intermittent on step 1. Continue on step 1 diuretic therapy (furosemide 40 mg, spironolactone 100 mg). Lower extremity edema  Continue on step 1 diuretic therapy (furosemide 40 mg, spironolactone 100 mg)    Screening for esophageal varices   The patient has small esophageal varices without prior bleeding. EGD was performed by Dr. Reji Magdaleno 4/08/2021. Small esophageal varices found but not banded. Recommend repeating in 2 years. Hepatic encephalopathy   This has not developed to date. There is no need for treatment with lactulose and/or Xifaxan at this time. No need to restrict dietary protein at this time. Itching   This is secondary to the underlying liver disease. It is mild and the patient does not feel like she needs any medical treatment for this at this time. Essential lavender oil is helping her with this symptom. Screening for hepatocellular carcinoma  HCC screening last performed with MRI 9/2020 and was negative for [address] AFP was also normal. Will order AFP today and imaging to be done prior to the next appointment. Treatment of other medical problems in patients with chronic liver disease  There are no contraindications for the patient to take any medications that are necessary for treatment of other medical issues. The patient does not consume alcohol daily. Normal doses of acetaminophen can be used for pain as needed. Normal doses of acetaminophen as recommended on the label are not hepatotoxic, even in patients with cirrhosis. The patient has cirrhosis.   Patients with cirrhosis should not use NSAIDs if possible as this is associated with a higher rate of ILEANA. Counseling for alcohol in patients with chronic liver disease  The patient does not consume any significant amount of alcohol. Thrombocytopenia   This is secondary to cirrhosis. There is no evidence of overt bleeding. No treatment is required. The platelet count is adequate for the patient to undergo procedures without the need for platelet transfusion or platelet growth factors. Osteoporosis  The risk of osteoporosis is increased in patients with PBC and cirrhosis. DEXA bone density to assess for osteoporosis has not been performed. This should be ordered by the patient's primary care physician. Vaccinations   Routine vaccinations against other bacterial and viral agents can be performed as indicated. Annual flu vaccination should be administered if indicated. ALLERGIES  Allergies   Allergen Reactions    Latex Hives    Sulfa (Sulfonamide Antibiotics) Nausea and Vomiting     MEDICATIONS  Current Outpatient Medications   Medication Sig    Ocaliva 5 mg tab TAKE 1 TABLET TWO DAYS A WEEK. DOSES MUST BE THREE DAYS APART.  ursodioL (ACTIGALL) 500 mg tablet Take 3 tablets by mouth once daily    furosemide (LASIX) 40 mg tablet Take 1 Tab by mouth daily.  spironolactone (ALDACTONE) 100 mg tablet Take 1 Tab by mouth daily. No current facility-administered medications for this visit. SYSTEM REVIEW NOT RELATED TO LIVER DISEASE OR REVIEWED ABOVE:  Constitution systems: Negative for fever, chills, weight gain, weight loss. Eyes: Negative for visual changes. ENT: Negative for sore throat, painful swallowing. Respiratory: Negative for cough, hemoptysis, SOB. Cardiology: Negative for chest pain, palpitations. GI:  Negative for constipation or diarrhea. : Negative for urinary frequency, dysuria, hematuria, nocturia. Skin: Negative for rash.   Hematology: Negative for easy bruising, blood clots. Musculo-skelatal: Low back pain, myalgias, arthralgias. Neurologic: Negative for headaches, dizziness, vertigo, memory problems not related to HE. Psychology: Negative for anxiety, depression. FAMILY HISTORY:  The patient is adopted and does not know any of her family history. SOCIAL HISTORY:  The patient is . The patient has 3 children and 15 grandchildren. The patient has never used tobacco products. The patient has never consumed significant amounts of alcohol. The patient used to work as . PHYSICAL EXAMINATION:  Visit Vitals  /65 (BP 1 Location: Right upper arm, BP Patient Position: Sitting, BP Cuff Size: Large adult)   Pulse 72   Temp 97 °F (36.1 °C) (Temporal)   Resp 16   Ht 5' 2\" (1.575 m)   Wt 204 lb 12.8 oz (92.9 kg)   SpO2 98%   BMI 37.46 kg/m²       General: No acute distress. Eyes: Sclera anicteric. ENT: No oral lesions. Thyroid normal.  Nodes: No adenopathy. Skin: No spider angiomata. No jaundice. No palmar erythema. Respiratory: Lungs clear to auscultation. Cardiovascular: Regular heart rate. No murmurs. No JVD. Abdomen: Soft non-tender, liver size normal to percussion/palpation. Spleen not palpable. No obvious ascites. Extremities: No edema. No muscle wasting. Swelling noted adjacent to the left patella. Pain with movement. Decreased strength with standing in B/L lower extremities. Neurologic: Alert and oriented. Cranial nerves grossly intact. No asterixis.     LABORATORY STUDIES:  [facility] of [address] [place] [address] Units 5/17/2021 2/1/2021 9/4/2020   WBC 3.6 - 11.0 K/uL 4.4 4.2 5.6   ANC 1.8 - 8.0 K/UL 3.2 2.9 3.9   HGB 11.5 - 16.0 g/dL 12.8 15.0 13.9    - 400 K/uL 107 (L) 103 (L) 122 (L)   INR 0.9 - 1.1   1.2 (H) 1.1 1.1   AST 15 - 37 U/L 22 27 25   ALT 12 - 78 U/L 12 13 16   Alk Phos 45 - 117 U/L 319 (H) 190 (H) 246 (H)   Bili, Total 0.2 - 1.0 MG/DL 1.7 (H) 2.4 (H) 1.8 (H)   Bili, Direct 0.0 - 0.2 MG/DL 0.8 (H) 0.78 (H) 0.8 (H)   Albumin 3.5 - 5.0 g/dL 2.7 (L) 3.2 (L) 3.2 (L)   BUN 6 - 20 MG/DL 8 12 16   Creat 0.55 - 1.02 MG/DL 0.55 0.74 1.04 (H)   Na 136 - 145 mmol/L 139 142 138   K 3.5 - 5.1 mmol/L 3.6 4.0 4.1   Cl 97 - 108 mmol/L 109 (H) 110 (H) 107   CO2 21 - 32 mmol/L 25 20 24   Glucose 65 - 100 mg/dL 87 97 109 (H)     Cancer Screening Latest Ref Rng & Units 9/4/2020 10/25/2019 5/28/2019   AFP, Serum 0.0 - 8.0 ng/mL 4.2 3.9 3.9   AFP-L3% 0.0 - 9.9 % Comment Comment Comment     Laboratory testing from 2/01/2021 reviewed in detail. Additional testing included to evaluate progression or regression of disease. Laboratory testing results from today will be communicated by phone or mail. SEROLOGIES:  8/2018. HBsurface antigen negative, anti-HBcore total negative, anti-HBsurface negative, anti-HCV negative, NEYMAR negative    Serologies Latest Ref Rng & Units 10/12/2018   NEYMAR, IFA  Negative   ASMCA 0 - 19 Units 73 (H)   M2 Ab 0.0 - 20.0 Units 25.2 (H)     LIVER HISTOLOGY:  10/2019. FibroScan performed at [address]. EkPa was 13.0. IQR/med 18%. . The results suggested a fibrosis level of F3-4. The CAP score suggests mild fatty liver. ENDOSCOPIC PROCEDURES:  3/2017. Colonoscopy by Dr Val Davis. No polyps. 3/2017. EGD by Dr Val Davis. Grade 1 esophageal varices. Mild portal gastropathy. 3/2019 Small esophageal varices. No banding performed. Mild portal hypertensive gastropathy. Gastritis of the antrum. No gastric varices identified. RADIOLOGY:  10/2018. Ultrasound of liver. Echogenic consistent with cirrhosis. No liver mass lesions. No dilated bile ducts. No ascites. 6/2019. Ultrasound of liver. Echogenic consistent with cirrhosis. No liver mass lesions. No dilated bile ducts. No ascites. 9/2020. MRI of liver. Cirrhotic appearing liver. CBD 9 mm without stone. No liver mass or lesion. No ascites.      OTHER TESTING:  Not available or performed    FOLLOW-UP:  All of the issues listed above in the Assessment and Plan were discussed with the patient. All questions were answered. The patient expressed a clear understanding of the above. [facility] 87 in 3 months. Imaging will be completed prior to the next appointment. CHENCHO Pereyra-[facility] of [address] [facility] [address] [address] [address], [address] [place] [address] 22.  201 [facility]

## 2021-05-17 NOTE — PROGRESS NOTES
Identified pt with two pt identifiers(name and ). Reviewed record in preparation for visit and have obtained necessary documentation. Chief Complaint   Patient presents with    Cirrhosis Of Liver     f/u    Other     PBC      Vitals:    21 1320   BP: 137/65   Pulse: 72   Resp: 16   Temp: 97 °F (36.1 °C)   TempSrc: Temporal   SpO2: 98%   Weight: 204 lb 12.8 oz (92.9 kg)   Height: 5' 2\" (1.575 m)   PainSc:   3   PainLoc: Knee       Health Maintenance Review: Patient reminded of \"due or due soon\" health maintenance. I have asked the patient to contact his/her primary care provider (PCP) for follow-up on his/her health maintenance. Coordination of Care Questionnaire:  :   1) Have you been to an emergency room, urgent care, or hospitalized since your last visit? If yes, where when, and reason for visit? yes       2. Have seen or consulted any other health care provider since your last visit? If yes, where when, and reason for visit? NO      Patient is accompanied by self I have received verbal consent from Chris Daniels to discuss any/all medical information while they are present in the room.

## 2021-05-18 LAB
ALBUMIN SERPL-MCNC: 2.7 G/DL (ref 3.5–5)
ALBUMIN/GLOB SERPL: 0.6 {RATIO} (ref 1.1–2.2)
ALP SERPL-CCNC: 319 U/L (ref 45–117)
ALT SERPL-CCNC: 12 U/L (ref 12–78)
ANION GAP SERPL CALC-SCNC: 5 MMOL/L (ref 5–15)
AST SERPL-CCNC: 22 U/L (ref 15–37)
BASOPHILS # BLD: 0 K/UL (ref 0–0.1)
BASOPHILS NFR BLD: 1 % (ref 0–1)
BILIRUB DIRECT SERPL-MCNC: 0.8 MG/DL (ref 0–0.2)
BILIRUB SERPL-MCNC: 1.7 MG/DL (ref 0.2–1)
BUN SERPL-MCNC: 8 MG/DL (ref 6–20)
BUN/CREAT SERPL: 15 (ref 12–20)
CALCIUM SERPL-MCNC: 8.1 MG/DL (ref 8.5–10.1)
CHLORIDE SERPL-SCNC: 109 MMOL/L (ref 97–108)
CO2 SERPL-SCNC: 25 MMOL/L (ref 21–32)
CREAT SERPL-MCNC: 0.55 MG/DL (ref 0.55–1.02)
DIFFERENTIAL METHOD BLD: ABNORMAL
EOSINOPHIL # BLD: 0 K/UL (ref 0–0.4)
EOSINOPHIL NFR BLD: 1 % (ref 0–7)
ERYTHROCYTE [DISTWIDTH] IN BLOOD BY AUTOMATED COUNT: 14.2 % (ref 11.5–14.5)
GLOBULIN SER CALC-MCNC: 4.3 G/DL (ref 2–4)
GLUCOSE SERPL-MCNC: 87 MG/DL (ref 65–100)
HCT VFR BLD AUTO: 38.2 % (ref 35–47)
HGB BLD-MCNC: 12.8 G/DL (ref 11.5–16)
IMM GRANULOCYTES # BLD AUTO: 0 K/UL (ref 0–0.04)
IMM GRANULOCYTES NFR BLD AUTO: 0 % (ref 0–0.5)
INR PPP: 1.2 (ref 0.9–1.1)
LYMPHOCYTES # BLD: 0.7 K/UL (ref 0.8–3.5)
LYMPHOCYTES NFR BLD: 17 % (ref 12–49)
MCH RBC QN AUTO: 34.8 PG (ref 26–34)
MCHC RBC AUTO-ENTMCNC: 33.5 G/DL (ref 30–36.5)
MCV RBC AUTO: 103.8 FL (ref 80–99)
MONOCYTES # BLD: 0.5 K/UL (ref 0–1)
MONOCYTES NFR BLD: 12 % (ref 5–13)
NEUTS SEG # BLD: 3.2 K/UL (ref 1.8–8)
NEUTS SEG NFR BLD: 69 % (ref 32–75)
NRBC # BLD: 0 K/UL (ref 0–0.01)
NRBC BLD-RTO: 0 PER 100 WBC
PLATELET # BLD AUTO: 107 K/UL (ref 150–400)
PMV BLD AUTO: 10.4 FL (ref 8.9–12.9)
POTASSIUM SERPL-SCNC: 3.6 MMOL/L (ref 3.5–5.1)
PROT SERPL-MCNC: 7 G/DL (ref 6.4–8.2)
PROTHROMBIN TIME: 12.1 SEC (ref 9–11.1)
RBC # BLD AUTO: 3.68 M/UL (ref 3.8–5.2)
RBC MORPH BLD: ABNORMAL
SODIUM SERPL-SCNC: 139 MMOL/L (ref 136–145)
WBC # BLD AUTO: 4.4 K/UL (ref 3.6–11)

## 2021-05-19 LAB
AFP L3 MFR SERPL: NORMAL % (ref 0–9.9)
AFP SERPL-MCNC: 3.9 NG/ML (ref 0–8)

## 2021-05-21 NOTE — PROGRESS NOTES
Letter sent to the patient regarding the blood work results. The liver numbers elevated, particularly the ALP. Will consider an MRI in the future if this continues to trend upwards.

## 2021-07-09 ENCOUNTER — TELEPHONE (OUTPATIENT)
Dept: HEMATOLOGY | Age: 76
End: 2021-07-09

## 2021-07-09 NOTE — TELEPHONE ENCOUNTER
----- Message from Lazaro Ahuja sent at 7/8/2021  1:33 PM EDT -----  Regarding:  Life  Contact: [phone]  General Message/Vendor Calls    Caller's first and last name:Pt      Reason for call:Pt would like a call back to discuss getting a bill paid as she had tried the billing line for multiple days and has not been able to get through. Callback required yes/no and why:Yes, discuss.        Best contact number(s):[phone]      Details to clarify the request:n/a      Lazaro Ahuja

## 2021-07-09 NOTE — TELEPHONE ENCOUNTER
Tried calling the patient this morning to give her the telephone number to reach the billing department. Left a voice message for the patient to please call [phone], to discuss her billing concerns.  [address]

## 2021-07-19 ENCOUNTER — DOCUMENTATION ONLY (OUTPATIENT)
Dept: HEMATOLOGY | Age: 76
End: 2021-07-19

## 2021-07-19 NOTE — PROGRESS NOTES
Returned call to [address] regarding Sanya Pickard. Eulalia LYNN cirrhosis is now contraindicated. Will discontinue the medication and see her back as scheduled.

## 2021-11-04 DIAGNOSIS — R60.1 GENERALIZED EDEMA: ICD-10-CM

## 2021-11-04 DIAGNOSIS — K74.3 PRIMARY BILIARY CHOLANGITIS (HCC): ICD-10-CM

## 2021-11-05 RX ORDER — SPIRONOLACTONE 100 MG/1
100 TABLET, FILM COATED ORAL DAILY
Qty: 90 TABLET | Refills: 3 | Status: SHIPPED | OUTPATIENT
Start: 2021-11-05 | End: 2021-11-16 | Stop reason: SDUPTHER

## 2021-11-05 RX ORDER — URSODIOL 500 MG/1
TABLET, FILM COATED ORAL
Qty: 270 TABLET | Refills: 5 | Status: SHIPPED | OUTPATIENT
Start: 2021-11-05 | End: 2021-11-16 | Stop reason: SDUPTHER

## 2021-11-16 DIAGNOSIS — K74.3 PRIMARY BILIARY CHOLANGITIS (HCC): ICD-10-CM

## 2021-11-16 DIAGNOSIS — R60.1 GENERALIZED EDEMA: ICD-10-CM

## 2021-11-16 RX ORDER — FUROSEMIDE 40 MG/1
40 TABLET ORAL DAILY
Qty: 90 TABLET | Refills: 3 | Status: SHIPPED | OUTPATIENT
Start: 2021-11-16

## 2021-11-16 RX ORDER — SPIRONOLACTONE 100 MG/1
100 TABLET, FILM COATED ORAL DAILY
Qty: 90 TABLET | Refills: 3 | Status: SHIPPED | OUTPATIENT
Start: 2021-11-16

## 2021-11-16 RX ORDER — URSODIOL 500 MG/1
TABLET, FILM COATED ORAL
Qty: 270 TABLET | Refills: 5 | Status: SHIPPED | OUTPATIENT
Start: 2021-11-16

## 2021-11-22 ENCOUNTER — OFFICE VISIT (OUTPATIENT)
Dept: HEMATOLOGY | Age: 76
End: 2021-11-22
Payer: MEDICARE

## 2021-11-22 VITALS
WEIGHT: 197.4 LBS | HEIGHT: 62 IN | SYSTOLIC BLOOD PRESSURE: 148 MMHG | BODY MASS INDEX: 36.33 KG/M2 | HEART RATE: 87 BPM | TEMPERATURE: 97.1 F | DIASTOLIC BLOOD PRESSURE: 76 MMHG | RESPIRATION RATE: 18 BRPM | OXYGEN SATURATION: 97 %

## 2021-11-22 DIAGNOSIS — K74.69 OTHER CIRRHOSIS OF LIVER (HCC): Primary | ICD-10-CM

## 2021-11-22 DIAGNOSIS — K74.3 PRIMARY BILIARY CHOLANGITIS (HCC): ICD-10-CM

## 2021-11-22 PROCEDURE — 1101F PT FALLS ASSESS-DOCD LE1/YR: CPT | Performed by: NURSE PRACTITIONER

## 2021-11-22 PROCEDURE — 99214 OFFICE O/P EST MOD 30 MIN: CPT | Performed by: NURSE PRACTITIONER

## 2021-11-22 PROCEDURE — G8399 PT W/DXA RESULTS DOCUMENT: HCPCS | Performed by: NURSE PRACTITIONER

## 2021-11-22 PROCEDURE — G8432 DEP SCR NOT DOC, RNG: HCPCS | Performed by: NURSE PRACTITIONER

## 2021-11-22 PROCEDURE — G8427 DOCREV CUR MEDS BY ELIG CLIN: HCPCS | Performed by: NURSE PRACTITIONER

## 2021-11-22 PROCEDURE — 1090F PRES/ABSN URINE INCON ASSESS: CPT | Performed by: NURSE PRACTITIONER

## 2021-11-22 PROCEDURE — G8536 NO DOC ELDER MAL SCRN: HCPCS | Performed by: NURSE PRACTITIONER

## 2021-11-22 PROCEDURE — G8417 CALC BMI ABV UP PARAM F/U: HCPCS | Performed by: NURSE PRACTITIONER

## 2021-11-22 NOTE — PROGRESS NOTES
Michael Tomlinson MD, Bebo Farfan MD Delanna Offer, RICHARD Benitez, [facility]     Lori Carr, [facility]   Armando Breaux [facility]    Phoebe Mcleod, [facility]       Fei Pollard [address]    at [address], [address] [place] [address], [facility].    [phone]    FAX: [address] [place] [address]    at [address], [address]    [phone]    FAX: [phone]       Patient Care Team:  Reggie Emery MD as PCP - General (Internal Medicine)      Problem List  Date Reviewed: 5/18/2021          Codes Class Noted    Severe obesity ([facility] ICD-10-CM: E66.01  ICD-9-CM: 278.01  9/4/2020        Primary biliary cholangitis ([facility] ICD-10-CM: K74.3  ICD-9-CM: 571.6  10/12/2018        Cirrhosis ([facility] ICD-10-CM: K74.60  ICD-9-CM: 571.5  10/12/2018        Thrombocytopenia ([facility] ICD-10-CM: D69.6  ICD-9-CM: 287.5  10/12/2018        Ascites ICD-10-CM: R18.8  ICD-9-CM: 789.59  10/12/2018              Hamp Lent returns to the [address] for management of primary biliary cholangitis. The active problem list, all pertinent past medical history, medications, endoscopic studies, radiologic findings and laboratory findings related to the liver disorder were reviewed with the patient. The patient is a 68 y.o.  female who was found to have chronic liver disease, cirrhosis and PBC in 2017 when she developed ascites. Assessment of liver fibrosis was performed with Fibroscan 10/2019. The result was 13.0 kPa which correlates with Bridging fibrosis-cirrhosis. The CAP score of 256 suggests mild fatty liver. Serologic evaluation for markers of chronic liver disease was positive for AMA.       The patient is currently receiving treatment for PBC with JOLIE 1500 mg daily    OCA was discontinued 7/2021 due to a change in indication with the company. She notes low back pain since discontinuation. It is unclear if this is musculoskeletal or from the change in medication. Since the last office visit the patient continues to have intermittent itching and low back pain. The patient has developed the following complications of cirrhosis: ascites and lower extremity edema which are well controlled on step 1 diuretic therapy. The patient notes itching. The patient has not experienced pain in the right side over the liver, dry eyes, dry mouth or dark urine. The patient completes all daily activities without any functional limitations. 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 The diagnosis of cirrhosis is based upon laboratory studies, imaging and complications of cirrhosis. Elastography performed 10/2019 suggests stage 3-4 fibrosis. Have performed laboratory testing to monitor liver function and degree of liver injury. This included BMP, hepatic panel, CBC with platelet count and INR. Laboratory testing from 5/17/2021 reviewed in detail. Follow-up testing ordered today. The liver transaminases are normal. The ALP is elevated. The liver function is mildly depressed. The platelet count is depressed. The CTP is 7. Child class B The MELD score is 10. Primary biliary cholangitis  The diagnosis is based upon serology and an elevation in ALP. Serologic testing for causes of chronic liver disease were positive for ASMA and AMA. A liver biopsy has not been performed. The patient is being treated with JOLIE 1500 mg daily along with OCA twice weekly. She is tolerating the medication well without side effects. The patient has had persistent elevation in ALP despite being on JOLIE. She was as started on OCA in 6/2019 at a dose of 5 mg Qweek. OCA was discontinued 7/2021. She is not responding to treatment. MRI was performed 9/2020 which demonstrated an enlarged CBC of 9 mm without evidence of stone or stricture. Ascites   Intermittent on step 1. Continue on step 1 diuretic therapy (furosemide 40 mg, spironolactone 100 mg). Lower extremity edema  Continue on step 1 diuretic therapy (furosemide 40 mg, spironolactone 100 mg)    Screening for esophageal varices   The patient has small esophageal varices without prior bleeding. EGD was performed by Dr. Vargas Brood 4/08/2021. Small esophageal varices found but not banded. Recommend repeating in 2 years. Hepatic encephalopathy   This has not developed to date. There is no need for treatment with lactulose and/or Xifaxan at this time. No need to restrict dietary protein at this time. Itching   This is secondary to the underlying liver disease. It is mild and the patient does not feel like she needs any medical treatment for this at this time. Essential lavender oil is helping her with this symptom. Screening for hepatocellular carcinoma  HCC screening last performed with MRI 10/2020 and was negative for [address] AFP was also normal. The patient states there is significant pain with ultrasound. Will order CT of abdomen. AFP ordered today. Treatment of other medical problems in patients with chronic liver disease  There are no contraindications for the patient to take any medications that are necessary for treatment of other medical issues. The patient does not consume alcohol daily. Normal doses of acetaminophen can be used for pain as needed. Normal doses of acetaminophen as recommended on the label are not hepatotoxic, even in patients with cirrhosis. The patient has cirrhosis. Patients with cirrhosis should not use NSAIDs if possible as this is associated with a higher rate of ILEANA.         Counseling for alcohol in patients with chronic liver disease  The patient does not consume any significant amount of alcohol. Thrombocytopenia   This is secondary to cirrhosis. There is no evidence of overt bleeding. No treatment is required. The platelet count is adequate for the patient to undergo procedures without the need for platelet transfusion or platelet growth factors. Osteoporosis  The risk of osteoporosis is increased in patients with PBC and cirrhosis. DEXA bone density to assess for osteoporosis has not been performed. This should be ordered by the patient's primary care physician. Vaccinations   Routine vaccinations against other bacterial and viral agents can be performed as indicated. Annual flu vaccination should be administered if indicated. ALLERGIES  Allergies   Allergen Reactions    Latex Hives    Sulfa (Sulfonamide Antibiotics) Nausea and Vomiting     MEDICATIONS  Current Outpatient Medications   Medication Sig    ursodioL (ACTIGALL) 500 mg tablet Take 3 tablets by mouth once daily    spironolactone (ALDACTONE) 100 mg tablet Take 1 Tablet by mouth daily.  furosemide (LASIX) 40 mg tablet Take 1 Tablet by mouth daily. No current facility-administered medications for this visit. SYSTEM REVIEW NOT RELATED TO LIVER DISEASE OR REVIEWED ABOVE:  Constitution systems: Negative for fever, chills, weight gain, weight loss. Eyes: Negative for visual changes. ENT: Negative for sore throat, painful swallowing. Respiratory: Negative for cough, hemoptysis, SOB. Cardiology: Negative for chest pain, palpitations. GI:  Negative for constipation or diarrhea. : Negative for urinary frequency, dysuria, hematuria, nocturia. Skin: Negative for rash. Hematology: Negative for easy bruising, blood clots. Musculo-skelatal: Low back pain, myalgias, arthralgias. Weakness   Neurologic: Negative for headaches, dizziness, vertigo, memory problems not related to HE. Psychology: Negative for anxiety, depression.      FAMILY HISTORY:  The patient is adopted and does not know any of her family history. SOCIAL HISTORY:  The patient is . The patient has 3 children and 15 grandchildren. The patient has never used tobacco products. The patient has never consumed significant amounts of alcohol. The patient used to work as . PHYSICAL EXAMINATION:  Visit Vitals  BP (!) 148/76 (BP 1 Location: Left upper arm, BP Patient Position: Sitting, BP Cuff Size: Adult)   Pulse 87   Temp 97.1 °F (36.2 °C) (Temporal)   Resp 18   Ht 5' 2\" (1.575 m)   Wt 197 lb 6.4 oz (89.5 kg)   SpO2 97%   BMI 36.10 kg/m²       General: No acute distress. Eyes: Sclera anicteric. ENT: No oral lesions. Thyroid normal.  Nodes: No adenopathy. Skin: No spider angiomata. No jaundice. No palmar erythema. Respiratory: Lungs clear to auscultation. Cardiovascular: Regular heart rate. No murmurs. No JVD. Abdomen: Soft non-tender, liver size normal to percussion/palpation. Spleen not palpable. No obvious ascites. Extremities: No edema. No muscle wasting. No gross arthritic changes. Neurologic: Alert and oriented. Cranial nerves grossly intact. No asterixis.     LABORATORY STUDIES:  Liver [place] of [id number] & Units 5/17/2021 2/1/2021 9/4/2020   WBC 3.6 - 11.0 K/uL 4.4 4.2 5.6   ANC 1.8 - 8.0 K/UL 3.2 2.9 3.9   HGB 11.5 - 16.0 g/dL 12.8 15.0 13.9    - 400 K/uL 107 (L) 103 (L) 122 (L)   INR 0.9 - 1.1   1.2 (H) 1.1 1.1   AST 15 - 37 U/L 22 27 25   ALT 12 - 78 U/L 12 13 16   Alk Phos 45 - 117 U/L 319 (H) 190 (H) 246 (H)   Bili, Total 0.2 - 1.0 MG/DL 1.7 (H) 2.4 (H) 1.8 (H)   Bili, Direct 0.0 - 0.2 MG/DL 0.8 (H) 0.78 (H) 0.8 (H)   Albumin 3.5 - 5.0 g/dL 2.7 (L) 3.2 (L) 3.2 (L)   BUN 6 - 20 MG/DL 8 12 16   Creat 0.55 - 1.02 MG/DL 0.55 0.74 1.04 (H)   Na 136 - 145 mmol/L 139 142 138   K 3.5 - 5.1 mmol/L 3.6 4.0 4.1   Cl 97 - 108 mmol/L 109 (H) 110 (H) 107   CO2 21 - 32 mmol/L 25 20 24   Glucose 65 - 100 mg/dL 87 97 109 (H)     Cancer Screening Latest Ref Rng & Units 5/17/2021 9/4/2020 10/25/2019   AFP, Serum 0.0 - 8.0 ng/mL 3.9 4.2 3.9   AFP-L3% 0.0 - 9.9 % Comment Comment Comment     Laboratory testing from 5/17/2021 reviewed in detail. Additional testing included to evaluate progression or regression of disease. Laboratory testing results from today will be communicated by My Chart. SEROLOGIES:  8/2018. HBsurface antigen negative, anti-HBcore total negative, anti-HBsurface negative, anti-HCV negative, NEYMAR negative    Serologies Latest Ref Rng & Units 10/12/2018   NEYMAR, IFA  Negative   ASMCA 0 - 19 Units 73 (H)   M2 Ab 0.0 - 20.0 Units 25.2 (H)     LIVER HISTOLOGY:  10/2019. FibroScan performed at [address]. EkPa was 13.0. IQR/med 18%. . The results suggested a fibrosis level of F3-4. The CAP score suggests mild fatty liver. ENDOSCOPIC PROCEDURES:  3/2017. Colonoscopy by Dr Lauro Hall. No polyps. 3/2017. EGD by Dr Lauro Hall. Grade 1 esophageal varices. Mild portal gastropathy. 3/2019 Small esophageal varices. No banding performed. Mild portal hypertensive gastropathy. Gastritis of the antrum. No gastric varices identified. RADIOLOGY:  10/2018. Ultrasound of liver. Echogenic consistent with cirrhosis. No liver mass lesions. No dilated bile ducts. No ascites. 6/2019. Ultrasound of liver. Echogenic consistent with cirrhosis. No liver mass lesions. No dilated bile ducts. No ascites. 9/2020. MRI of liver. Cirrhotic appearing liver. CBD 9 mm without stone. No liver mass or lesion. No ascites. OTHER TESTING:  Not available or performed    FOLLOW-UP:  All of the issues listed above in the Assessment and Plan were discussed with the patient. All questions were answered. The patient expressed a clear understanding of the above. [facility] in 3 months for routine monitoring. April S.  [facility] [phone] [address]  [facility], [facility]

## 2021-11-22 NOTE — PROGRESS NOTES
Identified pt with two pt identifiers(name and ). Reviewed record in preparation for visit and have obtained necessary documentation. All patient medications has been reviewed. No chief complaint on file. 3 most recent PHQ Screens 2021   Little interest or pleasure in doing things Not at all   Feeling down, depressed, irritable, or hopeless Not at all   Total Score PHQ 2 0     Abuse Screening Questionnaire 2021   Do you ever feel afraid of your partner? N   Are you in a relationship with someone who physically or mentally threatens you? N   Is it safe for you to go home? Y       Health Maintenance Due   Topic    Hepatitis C Screening     DTaP/Tdap/Td series (1 - Tdap)    Shingrix Vaccine Age 49> (1 of 2)    Pneumococcal 65+ years (1 of 1 - PPSV23)    Medicare Yearly Exam     Flu Vaccine (1)     Health Maintenance Review: Patient reminded of \"due or due soon\" health maintenance. I have asked the patient to contact his/her primary care provider (PCP) for follow-up on his/her health maintenance. Vitals:    21 1621   BP: (!) 148/76   Pulse: 87   Resp: 18   Temp: 97.1 °F (36.2 °C)   TempSrc: Temporal   SpO2: 97%   Weight: 197 lb 6.4 oz (89.5 kg)   Height: 5' 2\" (1.575 m)   PainSc:   7   PainLoc: Back       Wt Readings from Last 3 Encounters:   21 197 lb 6.4 oz (89.5 kg)   21 204 lb 12.8 oz (92.9 kg)   21 200 lb (90.7 kg)     Temp Readings from Last 3 Encounters:   21 97.1 °F (36.2 °C) (Temporal)   21 97 °F (36.1 °C) (Temporal)   21 97.3 °F (36.3 °C)     BP Readings from Last 3 Encounters:   21 (!) 148/76   21 137/65   21 114/66     Pulse Readings from Last 3 Encounters:   21 87   21 72   21 72       Coordination of Care Questionnaire:   1) Have you been to an emergency room, urgent care, or hospitalized since your last visit? No    2. Have seen or consulted any other health care provider since your last visit? No

## 2021-11-30 LAB
AFP L3 MFR SERPL: NORMAL % (ref 0–9.9)
AFP SERPL-MCNC: 3.4 NG/ML (ref 0–8)
ALBUMIN SERPL-MCNC: 3.1 G/DL (ref 3.7–4.7)
ALP SERPL-CCNC: 285 IU/L (ref 44–121)
ALT SERPL-CCNC: 8 IU/L (ref 0–32)
AST SERPL-CCNC: 22 IU/L (ref 0–40)
BASOPHILS # BLD AUTO: 0.1 X10E3/UL (ref 0–0.2)
BASOPHILS NFR BLD AUTO: 1 %
BILIRUB DIRECT SERPL-MCNC: 0.63 MG/DL (ref 0–0.4)
BILIRUB SERPL-MCNC: 1.7 MG/DL (ref 0–1.2)
BUN SERPL-MCNC: 11 MG/DL (ref 8–27)
BUN/CREAT SERPL: 14 (ref 12–28)
CALCIUM SERPL-MCNC: 9.1 MG/DL (ref 8.7–10.3)
CHLORIDE SERPL-SCNC: 107 MMOL/L (ref 96–106)
CO2 SERPL-SCNC: 21 MMOL/L (ref 20–29)
CREAT SERPL-MCNC: 0.78 MG/DL (ref 0.57–1)
EOSINOPHIL # BLD AUTO: 0.3 X10E3/UL (ref 0–0.4)
EOSINOPHIL NFR BLD AUTO: 7 %
ERYTHROCYTE [DISTWIDTH] IN BLOOD BY AUTOMATED COUNT: 11.6 % (ref 11.7–15.4)
GLUCOSE SERPL-MCNC: 96 MG/DL (ref 65–99)
HCT VFR BLD AUTO: 32.4 % (ref 34–46.6)
HGB BLD-MCNC: 11.6 G/DL (ref 11.1–15.9)
IMM GRANULOCYTES # BLD AUTO: 0 X10E3/UL (ref 0–0.1)
IMM GRANULOCYTES NFR BLD AUTO: 0 %
INR PPP: 1.1 (ref 0.9–1.2)
LYMPHOCYTES # BLD AUTO: 1 X10E3/UL (ref 0.7–3.1)
LYMPHOCYTES NFR BLD AUTO: 20 %
MCH RBC QN AUTO: 33.7 PG (ref 26.6–33)
MCHC RBC AUTO-ENTMCNC: 35.8 G/DL (ref 31.5–35.7)
MCV RBC AUTO: 94 FL (ref 79–97)
MONOCYTES # BLD AUTO: 0.7 X10E3/UL (ref 0.1–0.9)
MONOCYTES NFR BLD AUTO: 13 %
NEUTROPHILS # BLD AUTO: 3 X10E3/UL (ref 1.4–7)
NEUTROPHILS NFR BLD AUTO: 59 %
PLATELET # BLD AUTO: 125 X10E3/UL (ref 150–450)
POTASSIUM SERPL-SCNC: 4.1 MMOL/L (ref 3.5–5.2)
PROT SERPL-MCNC: 6.6 G/DL (ref 6–8.5)
PROTHROMBIN TIME: 11.4 SEC (ref 9.1–12)
RBC # BLD AUTO: 3.44 X10E6/UL (ref 3.77–5.28)
SODIUM SERPL-SCNC: 140 MMOL/L (ref 134–144)
WBC # BLD AUTO: 5.1 X10E3/UL (ref 3.4–10.8)

## 2021-12-01 NOTE — PROGRESS NOTES
Letter sent to the patient regarding the blood work results which show stable liver numbers and function. We will see her back in 3 months.

## 2021-12-07 ENCOUNTER — HOSPITAL ENCOUNTER (OUTPATIENT)
Dept: CT IMAGING | Age: 76
Discharge: HOME | End: 2021-12-07
Payer: MEDICARE

## 2021-12-07 DIAGNOSIS — K74.3 PRIMARY BILIARY CHOLANGITIS (HCC): ICD-10-CM

## 2021-12-07 DIAGNOSIS — K74.69 OTHER CIRRHOSIS OF LIVER (HCC): ICD-10-CM

## 2021-12-07 PROCEDURE — 74160 CT ABDOMEN W/CONTRAST: CPT

## 2021-12-07 PROCEDURE — 74011000636 HC RX REV CODE- 636: Performed by: NURSE PRACTITIONER

## 2021-12-07 RX ADMIN — IOPAMIDOL 100 ML: 755 INJECTION, SOLUTION INTRAVENOUS at 13:30

## 2021-12-20 NOTE — PROGRESS NOTES
Letter sent to the patient regarding the blood work results. There are no concerning liver masses or lesions. There are stones in the gallbladder but they are stable.

## 2022-03-08 ENCOUNTER — OFFICE VISIT (OUTPATIENT)
Dept: HEMATOLOGY | Age: 77
End: 2022-03-08
Payer: MEDICARE

## 2022-03-08 VITALS
TEMPERATURE: 96.9 F | BODY MASS INDEX: 33.71 KG/M2 | WEIGHT: 183.2 LBS | HEIGHT: 62 IN | DIASTOLIC BLOOD PRESSURE: 74 MMHG | SYSTOLIC BLOOD PRESSURE: 152 MMHG | HEART RATE: 76 BPM

## 2022-03-08 DIAGNOSIS — K74.69 OTHER CIRRHOSIS OF LIVER (HCC): Primary | ICD-10-CM

## 2022-03-08 DIAGNOSIS — K74.3 PRIMARY BILIARY CHOLANGITIS (HCC): ICD-10-CM

## 2022-03-08 DIAGNOSIS — M54.50 CHRONIC BILATERAL LOW BACK PAIN WITHOUT SCIATICA: ICD-10-CM

## 2022-03-08 DIAGNOSIS — G89.29 CHRONIC BILATERAL LOW BACK PAIN WITHOUT SCIATICA: ICD-10-CM

## 2022-03-08 PROCEDURE — G8399 PT W/DXA RESULTS DOCUMENT: HCPCS | Performed by: NURSE PRACTITIONER

## 2022-03-08 PROCEDURE — G8417 CALC BMI ABV UP PARAM F/U: HCPCS | Performed by: NURSE PRACTITIONER

## 2022-03-08 PROCEDURE — 99214 OFFICE O/P EST MOD 30 MIN: CPT | Performed by: NURSE PRACTITIONER

## 2022-03-08 PROCEDURE — 1101F PT FALLS ASSESS-DOCD LE1/YR: CPT | Performed by: NURSE PRACTITIONER

## 2022-03-08 PROCEDURE — G8536 NO DOC ELDER MAL SCRN: HCPCS | Performed by: NURSE PRACTITIONER

## 2022-03-08 PROCEDURE — 1090F PRES/ABSN URINE INCON ASSESS: CPT | Performed by: NURSE PRACTITIONER

## 2022-03-08 PROCEDURE — G8427 DOCREV CUR MEDS BY ELIG CLIN: HCPCS | Performed by: NURSE PRACTITIONER

## 2022-03-08 PROCEDURE — G8432 DEP SCR NOT DOC, RNG: HCPCS | Performed by: NURSE PRACTITIONER

## 2022-03-08 NOTE — PROGRESS NOTES
Identified pt with two pt identifiers(name and ). Reviewed record in preparation for visit and have obtained necessary documentation. Chief Complaint   Patient presents with    Cirrhosis Of Liver     3month f/u with April      Vitals:    22 1310   BP: (!) 152/74   Pulse: 76   Temp: 96.9 °F (36.1 °C)   TempSrc: Temporal   Weight: 183 lb 3.2 oz (83.1 kg)   Height: 5' 2\" (1.575 m)   PainSc:   5   PainLoc: Back       Health Maintenance Review: Patient reminded of \"due or due soon\" health maintenance. I have asked the patient to contact his/her primary care provider (PCP) for follow-up on his/her health maintenance. Coordination of Care Questionnaire:  :   1) Have you been to an emergency room, urgent care, or hospitalized since your last visit? If yes, where when, and reason for visit? no       2. Have seen or consulted any other health care provider since your last visit? If yes, where when, and reason for visit? NO      Patient is accompanied by self I have received verbal consent from Saurav Daniels to discuss any/all medical information while they are present in the room.

## 2022-03-08 NOTE — PROGRESS NOTES
[address] [facility], Tara EPSTEIN Jenne Pont, MD Idelia Morocco, RICHARD Cuevas, ACNP-BC     April S Bruno, PCNP-BC   Adelaida BundyANGELES, [facility]       [facility] [address]    at [address], [address] [place] [address] Mal Bales  22.    [phone]    FAX: [address] [place] [address], [address]    [phone]    FAX: [phone]       Patient Care Team:  Maria L Hays MD as PCP - General (Internal Medicine)      Problem List  Date Reviewed: 11/26/2021          Codes Class Noted    Severe obesity ([facility] ICD-10-CM: E66.01  ICD-9-CM: 278.01  9/4/2020        Primary biliary cholangitis ([facility] ICD-10-CM: K74.3  ICD-9-CM: 571.6  10/12/2018        Cirrhosis ([facility] ICD-10-CM: K74.60  ICD-9-CM: 571.5  10/12/2018        Thrombocytopenia ([facility] ICD-10-CM: D69.6  ICD-9-CM: 287.5  10/12/2018        Ascites ICD-10-CM: R18.8  ICD-9-CM: 789.59  10/12/2018              Tereso Girard returns to the [employer] of [address] [place] [address] for management of primary biliary cholangitis. The active problem list, all pertinent past medical history, medications, endoscopic studies, radiologic findings and laboratory findings related to the liver disorder were reviewed with the patient. The patient is a 68 y.o.  female who was found to have chronic liver disease, cirrhosis and PBC in 2017 when she developed ascites. Assessment of liver fibrosis was performed with Fibroscan 10/2019. The result was 13.0 kPa which correlates with Bridging fibrosis-cirrhosis. The CAP score of 256 suggests mild fatty liver. Serologic evaluation for markers of chronic liver disease was positive for AMA.       The patient is currently receiving treatment for PBC with JOLIE 1500 mg daily    OCA was discontinued 7/2021 due to a change in indication with the company. Since the last office visit the patient continues to have low back pain. A recent abdominal CT 12/2021 noted an incidental finding of a chronic compression fracture which may be contributing. The patient has developed the following complications of cirrhosis: ascites and lower extremity edema which are well controlled on furosemide 40 mg, spironolactone 100 mg. The patient notes mild itching which is manageable. The patient has not experienced pain in the right side over the liver, dry eyes, dry mouth or dark urine. The patient completes all daily activities with mild to moderate functional limitations due to low back pain. She uses a cane for an assistive device. 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 The diagnosis of cirrhosis is based upon laboratory studies, imaging and complications of cirrhosis. Elastography performed 10/2019 suggests stage 3-4 fibrosis. Have performed laboratory testing to monitor liver function and degree of liver injury. This included BMP, hepatic panel, CBC with platelet count and INR. Laboratory testing from 11/19/2021 reviewed in detail. Follow-up testing ordered today. The liver transaminases are normal. The ALP is elevated but improved. The liver function is normal. The platelet count is depressed. The CTP is 9. Child class B  The MELD score is 12. Primary biliary cholangitis  The diagnosis is based upon serology and an elevation in ALP. Serologic testing for causes of chronic liver disease were positive for ASMA and AMA. A liver biopsy has not been performed. The patient is being treated with JOLIE 1500 mg daily along with OCA twice weekly. She is tolerating the medication well without side effects. The patient has had persistent elevation in ALP despite being on JOLIE.   She was as started on OCA in 6/2019 at a dose of 5 mg Qweek. OCA was discontinued 7/2021. She is not responding to treatment. MRI was performed 9/2020 which demonstrated an enlarged CBC of 9 mm without evidence of stone or stricture. Imaging with abdominal CT 12/2021 notes cholelithiasis with no ductal dilatation. Cirrhotic appearing liver. Ascites   Intermittent on step 1. Continue on step 1 diuretic therapy (furosemide 40 mg, spironolactone 100 mg). Lower extremity edema  Continue on step 1 diuretic therapy (furosemide 40 mg, spironolactone 100 mg)    Low back pain  Advised patient to seek treatment with orthopedics. Screening for esophageal varices   The patient has small esophageal varices without prior bleeding. EGD was performed by Dr. Boo Agosto 4/08/2021. Small esophageal varices found but not banded. Recommend repeating in 2 years. Hepatic encephalopathy   This has not developed to date. There is no need for treatment with lactulose and/or Xifaxan at this time. No need to restrict dietary protein at this time. Itching   This is secondary to the underlying liver disease. It is mild and the patient does not feel like she needs any medical treatment for this at this time. Essential lavender oil is helping her with this symptom. Screening for hepatocellular carcinoma  HCC screening last performed with CT 12/2021 and was negative for [address] AFP was 11/2021. The next imaging will be due 6/2022. She has significant pain with ultrasound and prefers doing a CT for screening. Treatment of other medical problems in patients with chronic liver disease  There are no contraindications for the patient to take any medications that are necessary for treatment of other medical issues. The patient does not consume alcohol daily. Normal doses of acetaminophen can be used for pain as needed.  Normal doses of acetaminophen as recommended on the label are not hepatotoxic, even in patients with cirrhosis. The patient has cirrhosis. Patients with cirrhosis should not use NSAIDs if possible as this is associated with a higher rate of ILEANA. Counseling for alcohol in patients with chronic liver disease  The patient does not consume any significant amount of alcohol. Thrombocytopenia   This is secondary to cirrhosis. There is no evidence of overt bleeding. No treatment is required. The platelet count is adequate for the patient to undergo procedures without the need for platelet transfusion or platelet growth factors. Osteoporosis  The risk of osteoporosis is increased in patients with PBC and cirrhosis. DEXA bone density to assess for osteoporosis has not been performed. This should be ordered by the patient's primary care physician. Vaccinations   Routine vaccinations against other bacterial and viral agents can be performed as indicated. Annual flu vaccination should be administered if indicated. ALLERGIES  Allergies   Allergen Reactions    Latex Hives    Sulfa (Sulfonamide Antibiotics) Nausea and Vomiting     MEDICATIONS  Current Outpatient Medications   Medication Sig    ursodioL (ACTIGALL) 500 mg tablet Take 3 tablets by mouth once daily    spironolactone (ALDACTONE) 100 mg tablet Take 1 Tablet by mouth daily.  furosemide (LASIX) 40 mg tablet Take 1 Tablet by mouth daily. No current facility-administered medications for this visit. SYSTEM REVIEW NOT RELATED TO LIVER DISEASE OR REVIEWED ABOVE:  Constitution systems: Negative for fever, chills, weight gain, weight loss. Eyes: Negative for visual changes. ENT: Negative for sore throat, painful swallowing. Respiratory: Negative for cough, hemoptysis, SOB. Cardiology: Negative for chest pain, palpitations. GI:  Negative for constipation or diarrhea. : Negative for urinary frequency, dysuria, hematuria, nocturia. Skin: Negative for rash. Hematology: Negative for easy bruising, blood clots. Musculo-skelatal: Low back pain, myalgias, arthralgias. Weakness   Neurologic: Negative for headaches, dizziness, vertigo, memory problems not related to HE. Psychology: Negative for anxiety, depression. FAMILY HISTORY:  The patient is adopted and does not know any of her family history. SOCIAL HISTORY:  The patient is . The patient has 3 children and 15 grandchildren. The patient has never used tobacco products. The patient has never consumed significant amounts of alcohol. The patient used to work as . PHYSICAL EXAMINATION:  Visit Vitals  BP (!) 152/74 (BP 1 Location: Left upper arm, BP Patient Position: Sitting, BP Cuff Size: Adult)   Pulse 76   Temp 96.9 °F (36.1 °C) (Temporal)   Ht 5' 2\" (1.575 m)   Wt 183 lb 3.2 oz (83.1 kg)   BMI 33.51 kg/m²       General: No acute distress. Eyes: Sclera anicteric. ENT: No oral lesions. Thyroid normal.  Nodes: No adenopathy. Skin: No spider angiomata. No jaundice. No palmar erythema. Respiratory: Lungs clear to auscultation. Cardiovascular: Regular heart rate. No murmurs. No JVD. Abdomen: Soft non-tender, liver size normal to percussion/palpation. Spleen not palpable. No obvious ascites. Extremities: No edema. No muscle wasting. No gross arthritic changes. Neurologic: Alert and oriented. Cranial nerves grossly intact. No asterixis.     LABORATORY STUDIES:  L[place] of [id number] Units 3/8/2022 11/29/2021   WBC 3.6 - 11.0 K/uL 4.7 5.1   ANC 1.8 - 8.0 K/UL 3.2 3.0   HGB 11.5 - 16.0 g/dL 12.0 11.6    - 400 K/uL 135 (L) 125 (L)   INR 0.9 - 1.1   1.2 (H) 1.1   AST 15 - 37 U/L 22 22   ALT 12 - 78 U/L 14 8   Alk Phos 45 - 117 U/L 228 (H) 285 (H)   Bili, Total 0.2 - 1.0 MG/DL 2.5 (H) 1.7 (H)   Bili, Direct 0.0 - 0.2 MG/DL 1.0 (H) 0.63 (H)   Albumin 3.5 - 5.0 g/dL 2.6 (L) 3.1 (L)   BUN 6 - 20 MG/DL 18 11   Creat 0.55 - 1.02 MG/DL 0.90 0.78   Na 136 - 145 mmol/L 140 140   K 3.5 - 5.1 mmol/L 3.9 4.1   Cl 97 - 108 mmol/L 109 (H) 107 (H)   CO2 21 - 32 mmol/L 27 21   Glucose 65 - 100 mg/dL 100 96     Liver [facility] Latest Ref Rng & Units 5/17/2021   WBC 3.6 - 11.0 K/uL 4.4   ANC 1.8 - 8.0 K/UL 3.2   HGB 11.5 - 16.0 g/dL 12.8    - 400 K/uL 107 (L)   INR 0.9 - 1.1   1.2 (H)   AST 15 - 37 U/L 22   ALT 12 - 78 U/L 12   Alk Phos 45 - 117 U/L 319 (H)   Bili, Total 0.2 - 1.0 MG/DL 1.7 (H)   Bili, Direct 0.0 - 0.2 MG/DL 0.8 (H)   Albumin 3.5 - 5.0 g/dL 2.7 (L)   BUN 6 - 20 MG/DL 8   Creat 0.55 - 1.02 MG/DL 0.55   Na 136 - 145 mmol/L 139   K 3.5 - 5.1 mmol/L 3.6   Cl 97 - 108 mmol/L 109 (H)   CO2 21 - 32 mmol/L 25   Glucose 65 - 100 mg/dL 87     Cancer Screening Latest Ref Rng & Units 11/29/2021 5/17/2021 9/4/2020   AFP, Serum 0.0 - 8.0 ng/mL 3.4 3.9 4.2   AFP-L3% 0.0 - 9.9 % Comment Comment Comment     Laboratory testing from 11/29/2021 reviewed in detail. Additional testing included to evaluate progression or regression of disease. Laboratory testing results from today will be communicated by mail. SEROLOGIES:  8/2018. HBsurface antigen negative, anti-HBcore total negative, anti-HBsurface negative, anti-HCV negative, NEYMAR negative    Serologies Latest Ref Rng & Units 10/12/2018   NEYMAR, IFA  Negative   ASMCA 0 - 19 Units 73 (H)   M2 Ab 0.0 - 20.0 Units 25.2 (H)     LIVER HISTOLOGY:  10/2019. Fib[facility] at [facility]. EkPa was 13.0. IQR/med 18%. . The results suggested a fibrosis level of F3-4. The CAP score suggests mild fatty liver. ENDOSCOPIC PROCEDURES:  3/2017. Colonoscopy by Dr Makenna Paredes. No polyps. 3/2017. EGD by Dr Makenna Paredes. Grade 1 esophageal varices. Mild portal gastropathy. 3/2019 Small esophageal varices. No banding performed. Mild portal hypertensive gastropathy. Gastritis of the antrum. No gastric varices identified. RADIOLOGY:  10/2018. Ultrasound of liver. Echogenic consistent with cirrhosis. No liver mass lesions.  No dilated bile ducts. No ascites. 6/2019. Ultrasound of liver. Echogenic consistent with cirrhosis. No liver mass lesions. No dilated bile ducts. No ascites. 9/2020. MRI of liver. Cirrhotic appearing liver. CBD 9 mm without stone. No liver mass or lesion. No ascites. 12/2021. CT of abdomen. Cirrhotic appearing liver. Cholelithiasis. No ductal dilatation. No liver mass or lesion. No ascites. Small right pleural effusion. OTHER TESTING:  Not available or performed    FOLLOW-UP:  All of the issues listed above in the Assessment and Plan were discussed with the patient. All questions were answered. The patient expressed a clear understanding of the above. [address] [address] in 3 months for ongoing monitoring. April S.  [facility] [address] [address] Amari Bales, [address] [place] [address] 22.  [address] [facility]

## 2022-03-09 LAB
ALBUMIN SERPL-MCNC: 2.6 G/DL (ref 3.5–5)
ALBUMIN/GLOB SERPL: 0.6 {RATIO} (ref 1.1–2.2)
ALP SERPL-CCNC: 228 U/L (ref 45–117)
ALT SERPL-CCNC: 14 U/L (ref 12–78)
ANION GAP SERPL CALC-SCNC: 4 MMOL/L (ref 5–15)
AST SERPL-CCNC: 22 U/L (ref 15–37)
BASOPHILS # BLD: 0 K/UL (ref 0–0.1)
BASOPHILS NFR BLD: 1 % (ref 0–1)
BILIRUB DIRECT SERPL-MCNC: 1 MG/DL (ref 0–0.2)
BILIRUB SERPL-MCNC: 2.5 MG/DL (ref 0.2–1)
BUN SERPL-MCNC: 18 MG/DL (ref 6–20)
BUN/CREAT SERPL: 20 (ref 12–20)
CALCIUM SERPL-MCNC: 8.6 MG/DL (ref 8.5–10.1)
CHLORIDE SERPL-SCNC: 109 MMOL/L (ref 97–108)
CO2 SERPL-SCNC: 27 MMOL/L (ref 21–32)
CREAT SERPL-MCNC: 0.9 MG/DL (ref 0.55–1.02)
DIFFERENTIAL METHOD BLD: ABNORMAL
EOSINOPHIL # BLD: 0.1 K/UL (ref 0–0.4)
EOSINOPHIL NFR BLD: 3 % (ref 0–7)
ERYTHROCYTE [DISTWIDTH] IN BLOOD BY AUTOMATED COUNT: 16 % (ref 11.5–14.5)
GLOBULIN SER CALC-MCNC: 4.6 G/DL (ref 2–4)
GLUCOSE SERPL-MCNC: 100 MG/DL (ref 65–100)
HCT VFR BLD AUTO: 35.6 % (ref 35–47)
HGB BLD-MCNC: 12 G/DL (ref 11.5–16)
IMM GRANULOCYTES # BLD AUTO: 0 K/UL (ref 0–0.04)
IMM GRANULOCYTES NFR BLD AUTO: 0 % (ref 0–0.5)
INR PPP: 1.2 (ref 0.9–1.1)
LYMPHOCYTES # BLD: 0.7 K/UL (ref 0.8–3.5)
LYMPHOCYTES NFR BLD: 14 % (ref 12–49)
MCH RBC QN AUTO: 35.1 PG (ref 26–34)
MCHC RBC AUTO-ENTMCNC: 33.7 G/DL (ref 30–36.5)
MCV RBC AUTO: 104.1 FL (ref 80–99)
MONOCYTES # BLD: 0.7 K/UL (ref 0–1)
MONOCYTES NFR BLD: 15 % (ref 5–13)
NEUTS SEG # BLD: 3.2 K/UL (ref 1.8–8)
NEUTS SEG NFR BLD: 67 % (ref 32–75)
NRBC # BLD: 0 K/UL (ref 0–0.01)
NRBC BLD-RTO: 0 PER 100 WBC
PLATELET # BLD AUTO: 135 K/UL (ref 150–400)
PMV BLD AUTO: 10.4 FL (ref 8.9–12.9)
POTASSIUM SERPL-SCNC: 3.9 MMOL/L (ref 3.5–5.1)
PROT SERPL-MCNC: 7.2 G/DL (ref 6.4–8.2)
PROTHROMBIN TIME: 12.4 SEC (ref 9–11.1)
RBC # BLD AUTO: 3.42 M/UL (ref 3.8–5.2)
RBC MORPH BLD: ABNORMAL
RBC MORPH BLD: ABNORMAL
SODIUM SERPL-SCNC: 140 MMOL/L (ref 136–145)
WBC # BLD AUTO: 4.7 K/UL (ref 3.6–11)

## 2022-03-18 PROBLEM — K74.3 PRIMARY BILIARY CHOLANGITIS (HCC): Status: ACTIVE | Noted: 2018-10-12

## 2022-03-18 PROBLEM — D69.6 THROMBOCYTOPENIA (HCC): Status: ACTIVE | Noted: 2018-10-12

## 2022-03-18 PROBLEM — K74.60 CIRRHOSIS (HCC): Status: ACTIVE | Noted: 2018-10-12

## 2022-03-18 PROBLEM — R18.8 ASCITES: Status: ACTIVE | Noted: 2018-10-12

## 2022-03-19 PROBLEM — E66.01 SEVERE OBESITY (HCC): Status: ACTIVE | Noted: 2020-09-04

## 2022-06-09 ENCOUNTER — OFFICE VISIT (OUTPATIENT)
Dept: HEMATOLOGY | Age: 77
End: 2022-06-09
Payer: MEDICARE

## 2022-06-09 VITALS
WEIGHT: 179 LBS | HEART RATE: 88 BPM | BODY MASS INDEX: 32.94 KG/M2 | DIASTOLIC BLOOD PRESSURE: 74 MMHG | RESPIRATION RATE: 16 BRPM | SYSTOLIC BLOOD PRESSURE: 112 MMHG | OXYGEN SATURATION: 98 % | TEMPERATURE: 98 F | HEIGHT: 62 IN

## 2022-06-09 DIAGNOSIS — K74.69 OTHER CIRRHOSIS OF LIVER (HCC): Primary | ICD-10-CM

## 2022-06-09 DIAGNOSIS — K74.3 PRIMARY BILIARY CHOLANGITIS (HCC): ICD-10-CM

## 2022-06-09 PROCEDURE — G8432 DEP SCR NOT DOC, RNG: HCPCS | Performed by: NURSE PRACTITIONER

## 2022-06-09 PROCEDURE — G8536 NO DOC ELDER MAL SCRN: HCPCS | Performed by: NURSE PRACTITIONER

## 2022-06-09 PROCEDURE — 1123F ACP DISCUSS/DSCN MKR DOCD: CPT | Performed by: NURSE PRACTITIONER

## 2022-06-09 PROCEDURE — 1101F PT FALLS ASSESS-DOCD LE1/YR: CPT | Performed by: NURSE PRACTITIONER

## 2022-06-09 PROCEDURE — G8427 DOCREV CUR MEDS BY ELIG CLIN: HCPCS | Performed by: NURSE PRACTITIONER

## 2022-06-09 PROCEDURE — 1090F PRES/ABSN URINE INCON ASSESS: CPT | Performed by: NURSE PRACTITIONER

## 2022-06-09 PROCEDURE — G8417 CALC BMI ABV UP PARAM F/U: HCPCS | Performed by: NURSE PRACTITIONER

## 2022-06-09 PROCEDURE — G8399 PT W/DXA RESULTS DOCUMENT: HCPCS | Performed by: NURSE PRACTITIONER

## 2022-06-09 PROCEDURE — 99214 OFFICE O/P EST MOD 30 MIN: CPT | Performed by: NURSE PRACTITIONER

## 2022-06-09 RX ORDER — PREDNISONE 10 MG/1
TABLET ORAL
COMMUNITY
End: 2022-06-09

## 2022-06-09 RX ORDER — PREDNISONE 20 MG/1
TABLET ORAL
COMMUNITY
End: 2022-06-09

## 2022-06-09 RX ORDER — LATANOPROST 50 UG/ML
SOLUTION/ DROPS OPHTHALMIC
COMMUNITY
Start: 2022-06-08

## 2022-06-09 RX ORDER — CYCLOBENZAPRINE HCL 5 MG
TABLET ORAL
COMMUNITY

## 2022-06-09 NOTE — PROGRESS NOTES
[address], [place], [address], [place], [place]      RICHARD Van, ACNP-BC     April S Bruno [facility]   Alexander Handy FNP-HUMBLE Morin, [facility]       [facility]    at [place] [id number] [facility], [address] [facility], [facility] 22.    [phone]    FAX: [address] [place] [address], [address]    [phone]    FAX: [phone]       Patient Care Team:  Heather Churchill MD as PCP - General (Internal Medicine Physician)      Problem List  Date Reviewed: 6/12/2022          Codes Class Noted    Severe obesity ([facility] ICD-10-CM: E66.01  ICD-9-CM: 278.01  9/4/2020        Primary biliary cholangitis ([facility] ICD-10-CM: K74.3  ICD-9-CM: 571.6  10/12/2018        Cirrhosis ([facility] ICD-10-CM: K74.60  ICD-9-CM: 571.5  10/12/2018        Thrombocytopenia ([facility] ICD-10-CM: D69.6  ICD-9-CM: 287.5  10/12/2018        Ascites ICD-10-CM: R18.8  ICD-9-CM: 789.59  10/12/2018              [facility] for management of primary biliary cholangitis. The active problem list, all pertinent past medical history, medications, endoscopic studies, radiologic findings and laboratory findings related to the liver disorder were reviewed with the patient. The patient is a 68 y.o.  female who was found to have chronic liver disease, cirrhosis and PBC in 2017 when she developed ascites. Assessment of liver fibrosis was performed with Fibroscan 10/2019. The result was 13.0 kPa which correlates with Bridging fibrosis-cirrhosis. The CAP score of 256 suggests mild fatty liver. Serologic evaluation for markers of chronic liver disease was positive for AMA.       The patient is currently receiving treatment for PBC with JOLIE 1500 mg daily    OCA was discontinued 7/2021 due to a change in indication with the company. Since the last office visit the back pain has resolved. She feels well overall with no new complications of liver disease. The patient has developed the following complications of cirrhosis: ascites and lower extremity edema which are well controlled on furosemide 40 mg, spironolactone 100 mg. The patient notes mild itching which is manageable. The patient has not experienced pain in the right side over the liver, dry eyes, dry mouth or dark urine. The patient completes all daily activities with mild to moderate functional limitations due to low back pain. She uses a cane for an assistive device. 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 The diagnosis of cirrhosis is based upon laboratory studies, imaging and complications of cirrhosis. Elastography performed 10/2019 suggests stage 3-4 fibrosis. Have performed laboratory testing to monitor liver function and degree of liver injury. This included BMP, hepatic panel, CBC with platelet count and INR. Laboratory testing from 3/08/2022 reviewed in detail. Follow-up testing ordered today. The liver transaminases are normal. The ALP is elevated. The liver function is mildly depressed. The platelet count is depressed. The CTP is 8. Child class B  The MELD score is 11. Primary biliary cholangitis  The diagnosis is based upon serology and an elevation in ALP. Serologic testing for causes of chronic liver disease were positive for ASMA and AMA. A liver biopsy has not been performed. The patient is being treated with JOLIE 1500 mg daily along with OCA twice weekly. She is tolerating the medication well without side effects. Will continue at the current dose. The patient has had persistent elevation in ALP despite being on JOLIE. She was as started on OCA in 6/2019 at a dose of 5 mg Qweek.  OCA was discontinued 7/2021. She is not responding to treatment. MRI was performed 9/2020 which demonstrated an enlarged CBC of 9 mm without evidence of stone or stricture. Imaging with abdominal CT 12/2021 notes cholelithiasis with no ductal dilatation. Cirrhotic appearing liver. Ascites   Intermittent on step 1. Continue on step 1 diuretic therapy (furosemide 40 mg, spironolactone 100 mg). Lower extremity edema  Continue on step 1 diuretic therapy (furosemide 40 mg, spironolactone 100 mg)    Low back pain  Advised patient to seek treatment with orthopedics. Screening for esophageal varices   The patient has small esophageal varices without prior bleeding. EGD was performed by Dr. Bhavya Mcdonough 4/08/2021. Small esophageal varices found but not banded. Recommend repeating in 2 years. Hepatic encephalopathy   This has not developed to date. There is no need for treatment with lactulose and/or Xifaxan at this time. No need to restrict dietary protein at this time. Itching   This is secondary to the underlying liver disease. It is mild and the patient does not feel like she needs any medical treatment for this at this time. Essential lavender oil is helping her with this symptom. Screening for hepatocellular carcinoma  HCC screening last performed with CT 12/2021 and was negative for [address] AFP was 11/2021. The next imaging will be due 6/2022. She has significant pain with ultrasound and prefers doing a CT for screening. This will be ordered at her next office visit. Treatment of other medical problems in patients with chronic liver disease  There are no contraindications for the patient to take any medications that are necessary for treatment of other medical issues. The patient does not consume alcohol daily. Normal doses of acetaminophen can be used for pain as needed.  Normal doses of acetaminophen as recommended on the label are not hepatotoxic, even in patients with cirrhosis. The patient has cirrhosis. Patients with cirrhosis should not use NSAIDs if possible as this is associated with a higher rate of ILEANA. Counseling for alcohol in patients with chronic liver disease  The patient does not consume any significant amount of alcohol. Thrombocytopenia   This is secondary to cirrhosis. There is no evidence of overt bleeding. No treatment is required. The platelet count is adequate for the patient to undergo procedures without the need for platelet transfusion or platelet growth factors. Osteoporosis  The risk of osteoporosis is increased in patients with PBC and cirrhosis. DEXA bone density to assess for osteoporosis has not been performed. This should be ordered by the patient's primary care physician. Vaccinations   Routine vaccinations against other bacterial and viral agents can be performed as indicated. Annual flu vaccination should be administered if indicated. ALLERGIES  Allergies   Allergen Reactions    Latex Hives    Sulfa (Sulfonamide Antibiotics) Nausea and Vomiting     MEDICATIONS  Current Outpatient Medications   Medication Sig    latanoprost (XALATAN) 0.005 % ophthalmic solution     cyclobenzaprine (FLEXERIL) 5 mg tablet cyclobenzaprine 5 mg tablet   TAKE 1 2 TO 2 TABLETS BY MOUTH THREE TIMES DAILY AS NEEDED    ursodioL (ACTIGALL) 500 mg tablet Take 3 tablets by mouth once daily    spironolactone (ALDACTONE) 100 mg tablet Take 1 Tablet by mouth daily.  furosemide (LASIX) 40 mg tablet Take 1 Tablet by mouth daily. No current facility-administered medications for this visit. SYSTEM REVIEW NOT RELATED TO LIVER DISEASE OR REVIEWED ABOVE:  Constitution systems: Negative for fever, chills, weight gain, weight loss. Eyes: Negative for visual changes. ENT: Negative for sore throat, painful swallowing. Respiratory: Negative for cough, hemoptysis, SOB. Cardiology: Negative for chest pain, palpitations.   GI:  Negative for constipation or diarrhea. : Negative for urinary frequency, dysuria, hematuria, nocturia. Skin: Negative for rash. Hematology: Negative for easy bruising, blood clots. Musculo-skelatal: Low back pain, myalgias, arthralgias. Weakness   Neurologic: Negative for headaches, dizziness, vertigo, memory problems not related to HE. Psychology: Negative for anxiety, depression. FAMILY HISTORY:  The patient is adopted and does not know any of her family history. SOCIAL HISTORY:  The patient is . The patient has 3 children and 15 grandchildren. The patient has never used tobacco products. The patient has never consumed significant amounts of alcohol. The patient used to work as . PHYSICAL EXAMINATION:  Visit Vitals  /74 (BP 1 Location: Left upper arm, BP Patient Position: Sitting, BP Cuff Size: Adult)   Pulse 88   Temp 98 °F (36.7 °C) (Temporal)   Resp 16   Ht 5' 2\" (1.575 m)   Wt 179 lb (81.2 kg)   SpO2 98%   BMI 32.74 kg/m²       General: No acute distress. Eyes: Sclera anicteric. ENT: No oral lesions. Thyroid normal.  Nodes: No adenopathy. Skin: No spider angiomata. No jaundice. No palmar erythema. Respiratory: Lungs clear to auscultation. Cardiovascular: Regular heart rate. No murmurs. No JVD. Abdomen: Soft non-tender, liver size normal to percussion/palpation. Spleen not palpable. No obvious ascites. Extremities: No edema. No muscle wasting. No gross arthritic changes. Neurologic: Alert and oriented. Cranial nerves grossly intact. No asterixis.     LABORATORY STUDIES:  Liver [facility] of [address] [place] [address] Units 6/9/2022 3/8/2022 11/29/2021   WBC 3.6 - 11.0 K/uL 3.4 (L) 4.7 5.1   ANC 1.8 - 8.0 K/UL 1.7 (L) 3.2 3.0   HGB 11.5 - 16.0 g/dL 12.8 12.0 11.6    - 400 K/uL 100 (L) 135 (L) 125 (L)   INR 0.9 - 1.1   1.2 (H) 1.2 (H) 1.1   AST 15 - 37 U/L 27 22 22   ALT 12 - 78 U/L 14 14 8   Alk Phos 45 - 117 U/L 190 (H) 228 (H) 285 (H) Bili, Total 0.2 - 1.0 MG/DL 2.1 (H) 2.5 (H) 1.7 (H)   Bili, Direct 0.0 - 0.2 MG/DL 1.0 (H) 1.0 (H) 0.63 (H)   Albumin 3.5 - 5.0 g/dL 2.9 (L) 2.6 (L) 3.1 (L)   BUN 6 - 20 MG/DL 12 18 11   Creat 0.55 - 1.02 MG/DL 0.82 0.90 0.78   Na 136 - 145 mmol/L 140 140 140   K 3.5 - 5.1 mmol/L 4.1 3.9 4.1   Cl 97 - 108 mmol/L 111 (H) 109 (H) 107 (H)   CO2 21 - 32 mmol/L 26 27 21   Glucose 65 - 100 mg/dL 105 (H) 100 96     Cancer Screening Latest Ref Rng & Units 11/29/2021 5/17/2021 9/4/2020   AFP, Serum 0.0 - 8.0 ng/mL 3.4 3.9 4.2   AFP-L3% 0.0 - 9.9 % Comment Comment Comment     Laboratory testing from 3/08/2022 reviewed in detail. Additional testing included to evaluate progression or regression of disease. Laboratory testing results from today will be communicated by mail. SEROLOGIES:  8/2018. HBsurface antigen negative, anti-HBcore total negative, anti-HBsurface negative, anti-HCV negative, NEYMAR negative    Serologies Latest Ref Rng & Units 10/12/2018   NEYMAR, IFA  Negative   ASMCA 0 - 19 Units 73 (H)   M2 Ab 0.0 - 20.0 Units 25.2 (H)     LIVER HISTOLOGY:  10/2019. FibroScan performed at [address]. EkPa was 13.0. IQR/med 18%. . The results suggested a fibrosis level of F3-4. The CAP score suggests mild fatty liver. ENDOSCOPIC PROCEDURES:  3/2017. Colonoscopy by Dr Izabela Hi. No polyps. 3/2017. EGD by Dr Izabela Hi. Grade 1 esophageal varices. Mild portal gastropathy. 3/2019 Small esophageal varices. No banding performed. Mild portal hypertensive gastropathy. Gastritis of the antrum. No gastric varices identified. RADIOLOGY:  10/2018. Ultrasound of liver. Echogenic consistent with cirrhosis. No liver mass lesions. No dilated bile ducts. No ascites. 6/2019. Ultrasound of liver. Echogenic consistent with cirrhosis. No liver mass lesions. No dilated bile ducts. No ascites. 9/2020. MRI of liver. Cirrhotic appearing liver. CBD 9 mm without stone. No liver mass or lesion. No ascites. 12/2021. CT of abdomen. Cirrhotic appearing liver. Cholelithiasis. No ductal dilatation. No liver mass or lesion. No ascites. Small right pleural effusion. OTHER TESTING:  Not available or performed    FOLLOW-UP:  All of the issues listed above in the Assessment and Plan were discussed with the patient. All questions were answered. The patient expressed a clear understanding of the above. [address] [place] [address] in 4 months for ongoing monitoring. She is going to visit her daughter in [place] for 3 months, will extend the visit slightly. TAMMY Pereyra [address] of [address] [address] Amari Bales, [address] [facility].  [address] [facility]

## 2022-06-09 NOTE — PROGRESS NOTES
Identified pt with two pt identifiers(name and ). Reviewed record in preparation for visit and have obtained necessary documentation. Chief Complaint   Patient presents with    Cirrhosis Of Liver     3 mo f/u      Vitals:    22 1344   BP: 112/74   Pulse: 88   Resp: 16   Temp: 98 °F (36.7 °C)   TempSrc: Temporal   SpO2: 98%   Weight: 179 lb (81.2 kg)   Height: 5' 2\" (1.575 m)   PainSc:   0 - No pain       Health Maintenance Review: Patient reminded of \"due or due soon\" health maintenance. I have asked the patient to contact his/her primary care provider (PCP) for follow-up on his/her health maintenance. Coordination of Care Questionnaire:  :   1) Have you been to an emergency room, urgent care, or hospitalized since your last visit? If yes, where when, and reason for visit? no       2. Have seen or consulted any other health care provider since your last visit? If yes, where when, and reason for visit? YES, Eye doctor      Patient is accompanied by self I have received verbal consent from Stormy To to discuss any/all medical information while they are present in the room.

## 2022-06-10 LAB
ALBUMIN SERPL-MCNC: 2.9 G/DL (ref 3.5–5)
ALBUMIN/GLOB SERPL: 0.7 {RATIO} (ref 1.1–2.2)
ALP SERPL-CCNC: 190 U/L (ref 45–117)
ALT SERPL-CCNC: 14 U/L (ref 12–78)
ANION GAP SERPL CALC-SCNC: 3 MMOL/L (ref 5–15)
AST SERPL-CCNC: 27 U/L (ref 15–37)
BASOPHILS # BLD: 0 K/UL (ref 0–0.1)
BASOPHILS NFR BLD: 1 % (ref 0–1)
BILIRUB DIRECT SERPL-MCNC: 1 MG/DL (ref 0–0.2)
BILIRUB SERPL-MCNC: 2.1 MG/DL (ref 0.2–1)
BUN SERPL-MCNC: 12 MG/DL (ref 6–20)
BUN/CREAT SERPL: 15 (ref 12–20)
CALCIUM SERPL-MCNC: 8.9 MG/DL (ref 8.5–10.1)
CHLORIDE SERPL-SCNC: 111 MMOL/L (ref 97–108)
CO2 SERPL-SCNC: 26 MMOL/L (ref 21–32)
CREAT SERPL-MCNC: 0.82 MG/DL (ref 0.55–1.02)
DIFFERENTIAL METHOD BLD: ABNORMAL
EOSINOPHIL # BLD: 0.1 K/UL (ref 0–0.4)
EOSINOPHIL NFR BLD: 3 % (ref 0–7)
ERYTHROCYTE [DISTWIDTH] IN BLOOD BY AUTOMATED COUNT: 14.9 % (ref 11.5–14.5)
GLOBULIN SER CALC-MCNC: 4 G/DL (ref 2–4)
GLUCOSE SERPL-MCNC: 105 MG/DL (ref 65–100)
HCT VFR BLD AUTO: 38.4 % (ref 35–47)
HGB BLD-MCNC: 12.8 G/DL (ref 11.5–16)
IMM GRANULOCYTES # BLD AUTO: 0 K/UL (ref 0–0.04)
IMM GRANULOCYTES NFR BLD AUTO: 0 % (ref 0–0.5)
INR PPP: 1.2 (ref 0.9–1.1)
LYMPHOCYTES # BLD: 1 K/UL (ref 0.8–3.5)
LYMPHOCYTES NFR BLD: 28 % (ref 12–49)
MCH RBC QN AUTO: 34 PG (ref 26–34)
MCHC RBC AUTO-ENTMCNC: 33.3 G/DL (ref 30–36.5)
MCV RBC AUTO: 102.1 FL (ref 80–99)
MONOCYTES # BLD: 0.6 K/UL (ref 0–1)
MONOCYTES NFR BLD: 17 % (ref 5–13)
NEUTS SEG # BLD: 1.7 K/UL (ref 1.8–8)
NEUTS SEG NFR BLD: 51 % (ref 32–75)
NRBC # BLD: 0 K/UL (ref 0–0.01)
NRBC BLD-RTO: 0 PER 100 WBC
PLATELET # BLD AUTO: 100 K/UL (ref 150–400)
POTASSIUM SERPL-SCNC: 4.1 MMOL/L (ref 3.5–5.1)
PROT SERPL-MCNC: 6.9 G/DL (ref 6.4–8.2)
PROTHROMBIN TIME: 12.6 SEC (ref 9–11.1)
RBC # BLD AUTO: 3.76 M/UL (ref 3.8–5.2)
RBC MORPH BLD: ABNORMAL
SODIUM SERPL-SCNC: 140 MMOL/L (ref 136–145)
WBC # BLD AUTO: 3.4 K/UL (ref 3.6–11)

## 2022-06-14 LAB
AFP L3 MFR SERPL: 20.6 % (ref 0–9.9)
AFP SERPL-MCNC: 6.3 NG/ML (ref 0–9.2)

## 2022-06-17 DIAGNOSIS — K74.69 OTHER CIRRHOSIS OF LIVER (HCC): Primary | ICD-10-CM

## 2022-06-17 NOTE — PROGRESS NOTES
Called patient and left message regarding the blood work results. The liver enzymes and function have improved. The AFP L3 is elevated.  Will order triple phase CT

## 2022-06-21 ENCOUNTER — DOCUMENTATION ONLY (OUTPATIENT)
Dept: HEMATOLOGY | Age: 77
End: 2022-06-21

## 2022-06-21 NOTE — PROGRESS NOTES
Patient returned call. Advised she schedule CT scan due to elevated AFPL3. Phone number for scheduling provided.

## 2022-10-06 ENCOUNTER — OFFICE VISIT (OUTPATIENT)
Dept: HEMATOLOGY | Age: 77
End: 2022-10-06
Payer: MEDICARE

## 2022-10-06 VITALS
DIASTOLIC BLOOD PRESSURE: 56 MMHG | OXYGEN SATURATION: 96 % | SYSTOLIC BLOOD PRESSURE: 121 MMHG | WEIGHT: 177 LBS | BODY MASS INDEX: 32.57 KG/M2 | TEMPERATURE: 96.7 F | HEART RATE: 69 BPM | HEIGHT: 62 IN

## 2022-10-06 DIAGNOSIS — K74.69 OTHER CIRRHOSIS OF LIVER (HCC): Primary | ICD-10-CM

## 2022-10-06 DIAGNOSIS — R77.2 ELEVATED AFP: ICD-10-CM

## 2022-10-06 DIAGNOSIS — K74.3 PRIMARY BILIARY CHOLANGITIS (HCC): ICD-10-CM

## 2022-10-06 PROCEDURE — G8427 DOCREV CUR MEDS BY ELIG CLIN: HCPCS | Performed by: NURSE PRACTITIONER

## 2022-10-06 PROCEDURE — G8399 PT W/DXA RESULTS DOCUMENT: HCPCS | Performed by: NURSE PRACTITIONER

## 2022-10-06 PROCEDURE — 1101F PT FALLS ASSESS-DOCD LE1/YR: CPT | Performed by: NURSE PRACTITIONER

## 2022-10-06 PROCEDURE — 1123F ACP DISCUSS/DSCN MKR DOCD: CPT | Performed by: NURSE PRACTITIONER

## 2022-10-06 PROCEDURE — G8536 NO DOC ELDER MAL SCRN: HCPCS | Performed by: NURSE PRACTITIONER

## 2022-10-06 PROCEDURE — G8432 DEP SCR NOT DOC, RNG: HCPCS | Performed by: NURSE PRACTITIONER

## 2022-10-06 PROCEDURE — G8417 CALC BMI ABV UP PARAM F/U: HCPCS | Performed by: NURSE PRACTITIONER

## 2022-10-06 PROCEDURE — 99214 OFFICE O/P EST MOD 30 MIN: CPT | Performed by: NURSE PRACTITIONER

## 2022-10-06 PROCEDURE — 1090F PRES/ABSN URINE INCON ASSESS: CPT | Performed by: NURSE PRACTITIONER

## 2022-10-06 NOTE — PROGRESS NOTES
Identified pt with two pt identifiers(name and ). Reviewed record in preparation for visit and have obtained necessary documentation. Chief Complaint   Patient presents with    Follow-up      Vitals:    10/06/22 1519   BP: (!) 121/56   Pulse: 69   Temp: (!) 96.7 °F (35.9 °C)   TempSrc: Temporal   SpO2: 96%   Weight: 177 lb (80.3 kg)   Height: 5' 2\" (1.575 m)   PainSc:   6   PainLoc: Back       Health Maintenance Review: Patient reminded of \"due or due soon\" health maintenance. I have asked the patient to contact his/her primary care provider (PCP) for follow-up on his/her health maintenance. Coordination of Care Questionnaire:  :   1) Have you been to an emergency room, urgent care, or hospitalized since your last visit? If yes, where when, and reason for visit? no       2. Have seen or consulted any other health care provider since your last visit? If yes, where when, and reason for visit? NO      Patient is accompanied by self I have received verbal consent from Radha Payne to discuss any/all medical information while they are present in the room.

## 2022-10-06 NOTE — PROGRESS NOTES
[address] [facility] [place], [place], [address], [place], [place]      Andrzej Solomon, RICHARD Espinal, [facility]     Lori Carr, [facility]   Lukas Monahan, FNP-C Jaynee Castleman, [facility]       Fei [facility] [address]    at [place] [address] [address] [facility], [address]  22.    [phone]    FAX: [address] [place] [address], [address]    [phone]    FAX: [phone]       Patient Care Team:  See Hernandez MD as PCP - General (Internal Medicine Physician)      Problem List  Date Reviewed: 6/12/2022            Codes Class Noted    Severe obesity ([facility] ICD-10-CM: E66.01  ICD-9-CM: 278.01  9/4/2020        Primary biliary cholangitis ([facility] ICD-10-CM: K74.3  ICD-9-CM: 571.6  10/12/2018        Cirrhosis ([facility] ICD-10-CM: K74.60  ICD-9-CM: 571.5  10/12/2018        Thrombocytopenia ([facility] ICD-10-CM: D69.6  ICD-9-CM: 287.5  10/12/2018        Ascites ICD-10-CM: R18.8  ICD-9-CM: 789.59  10/12/2018         [facility] for management of primary biliary cholangitis. The active problem list, all pertinent past medical history, medications, endoscopic studies, radiologic findings and laboratory findings related to the liver disorder were reviewed with the patient. The patient is a 68 y.o.  female who was found to have chronic liver disease, cirrhosis and PBC in 2017 when she developed ascites. Assessment of liver fibrosis was performed with Fibroscan 10/2019. The result was 13.0 kPa which correlates with Bridging fibrosis-cirrhosis. The CAP score of 256 suggests mild fatty liver. Serologic evaluation for markers of chronic liver disease was positive for AMA.       The patient is currently receiving treatment for PBC with JOLIE 1500 mg daily    OCA was discontinued 7/2021 due to a change in indication with the company. Since the last office visit the patient has been visiting with family in [place] for the past 3 months. The AFP L3 increased prior to her leaving 6/2022. A triple phase CT scan was ordered but not completed. Advised this be scheduled ASAP. Will order AFP again today. Weight has decreased 27 lbs. The patient has developed the following complications of cirrhosis: ascites and lower extremity edema which are well controlled on furosemide 40 mg, spironolactone 100 mg. The patient notes mild itching which is manageable. The patient has not experienced pain in the right side over the liver, dry eyes, dry mouth or dark urine. The patient completes all daily activities with mild to moderate functional limitations due to low back pain. She uses a cane for an assistive device. 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 The diagnosis of cirrhosis is based upon laboratory studies, imaging and complications of cirrhosis. Elastography performed 10/2019 suggests stage 3-4 fibrosis. Have performed laboratory testing to monitor liver function and degree of liver injury. This included BMP, hepatic panel, CBC with platelet count and INR. Laboratory testing from 6/09/2022 reviewed in detail. Follow-up testing ordered today. The liver transaminases are normal. The ALP is elevated. The liver function is normal. The platelet count is depressed. The CTP is 8. Child class B  The MELD score is 13. Primary biliary cholangitis  The diagnosis is based upon serology and an elevation in ALP. Serologic testing for causes of chronic liver disease were positive for ASMA and AMA. A liver biopsy has not been performed. The patient has had persistent elevation in ALP despite being on JOLIE 1500 mg daily. Jennifer Holder She was as started on OCA in 6/2019 at a dose of 5 mg Qweek.  OCA was discontinued 7/2021. She is not responding to treatment. MRI was performed 9/2020 which demonstrated an enlarged CBC of 9 mm without evidence of stone or stricture. Imaging with abdominal CT 12/2021 notes cholelithiasis with no ductal dilatation. Cirrhotic appearing liver. Will continue treatment with JOLIE 1500 mg daily. Ascites   Intermittent on step 1. Will continue step 1 diuretic therapy (furosemide 40 mg, spironolactone 100 mg). Lower extremity edema  Will continue step 1 diuretic therapy (furosemide 40 mg, spironolactone 100 mg)    Screening for esophageal varices   The patient has small esophageal varices without prior bleeding. EGD was performed by Dr. Freddy Sampson 4/08/2021. Small esophageal varices found but not banded. Recommend repeating in 2 years. Hepatic encephalopathy   This has not developed to date. There is no need for treatment with lactulose and/or Xifaxan at this time. No need to restrict dietary protein at this time. Itching   This is secondary to the underlying liver disease. It is mild and the patient does not feel like she needs any medical treatment for this at this time. Essential lavender oil is helping her with this symptom. Screening for hepatocellular carcinoma  HCC screening last performed with CT scan 12/2021 and was negative for [address] AFP L3 elevated 6/2022. CT scan ordered but has not yet been performed. Advised she schedule this ASAP. AFP ordered today. Treatment of other medical problems in patients with chronic liver disease  There are no contraindications for the patient to take any medications that are necessary for treatment of other medical issues. The patient does not consume alcohol daily. Normal doses of acetaminophen can be used for pain as needed. Normal doses of acetaminophen as recommended on the label are not hepatotoxic, even in patients with cirrhosis. The patient has cirrhosis.   Patients with cirrhosis should not use NSAIDs if possible as this is associated with a higher rate of ILEANA. Counseling for alcohol in patients with chronic liver disease  The patient does not consume any significant amount of alcohol. Thrombocytopenia   This is secondary to cirrhosis. There is no evidence of overt bleeding. No treatment is required. The platelet count is adequate for the patient to undergo procedures without the need for platelet transfusion or platelet growth factors. Osteoporosis  The risk of osteoporosis is increased in patients with PBC and cirrhosis. DEXA bone density to assess for osteoporosis has not been performed. This should be ordered by the patient's primary care physician. Vaccinations   Routine vaccinations against other bacterial and viral agents can be performed as indicated. Annual flu vaccination should be administered if indicated. ALLERGIES  Allergies   Allergen Reactions    Latex Hives    Sulfa (Sulfonamide Antibiotics) Nausea and Vomiting     MEDICATIONS  Current Outpatient Medications   Medication Sig    latanoprost (XALATAN) 0.005 % ophthalmic solution     cyclobenzaprine (FLEXERIL) 5 mg tablet cyclobenzaprine 5 mg tablet   TAKE 1 2 TO 2 TABLETS BY MOUTH THREE TIMES DAILY AS NEEDED    ursodioL (ACTIGALL) 500 mg tablet Take 3 tablets by mouth once daily    spironolactone (ALDACTONE) 100 mg tablet Take 1 Tablet by mouth daily. furosemide (LASIX) 40 mg tablet Take 1 Tablet by mouth daily. No current facility-administered medications for this visit. SYSTEM REVIEW NOT RELATED TO LIVER DISEASE OR REVIEWED ABOVE:  Constitution systems: Negative for fever, chills, weight gain, weight loss. Eyes: Negative for visual changes. ENT: Negative for sore throat, painful swallowing. Respiratory: Negative for cough, hemoptysis, SOB. Cardiology: Negative for chest pain, palpitations. GI:  Negative for constipation or diarrhea.   : Negative for urinary frequency, dysuria, hematuria, nocturia. Skin: Negative for rash. Hematology: Negative for easy bruising, blood clots. Musculo-skelatal: Low back pain, myalgias, arthralgias. Weakness   Neurologic: Negative for headaches, dizziness, vertigo, memory problems not related to HE. Psychology: Negative for anxiety, depression. FAMILY HISTORY:  The patient is adopted and does not know any of her family history. SOCIAL HISTORY:  The patient is . The patient has 3 children and 15 grandchildren. The patient has never used tobacco products. The patient has never consumed significant amounts of alcohol. The patient used to work as . PHYSICAL EXAMINATION:  Visit Vitals  BP (!) 121/56 (BP 1 Location: Right upper arm, BP Patient Position: Sitting, BP Cuff Size: Adult)   Pulse 69   Temp (!) 96.7 °F (35.9 °C) (Temporal)   Ht 5' 2\" (1.575 m)   Wt 177 lb (80.3 kg)   SpO2 96%   BMI 32.37 kg/m²       General: No acute distress. Eyes: Sclera anicteric. ENT: No oral lesions. Thyroid normal.  Nodes: No adenopathy. Skin: No spider angiomata. No jaundice. No palmar erythema. Respiratory: Lungs clear to auscultation. Cardiovascular: Regular heart rate. No murmurs. No JVD. Abdomen: Soft non-tender, liver size normal to percussion/palpation. Spleen not palpable. No obvious ascites. Extremities: No edema. No muscle wasting. No gross arthritic changes. Neurologic: Alert and oriented. Cranial nerves grossly intact. No asterixis.     LABORATORY STUDIES:  Liver [place] of [address] Units 10/6/2022 6/9/2022 3/8/2022   WBC 3.6 - 11.0 K/uL 4.2 3.4 (L) 4.7   ANC 1.8 - 8.0 K/UL 2.8 1.7 (L) 3.2   HGB 11.5 - 16.0 g/dL 12.9 12.8 12.0    - 400 K/uL 111 (L) 100 (L) 135 (L)   INR 0.9 - 1.1   1.2 (H) 1.2 (H) 1.2 (H)   AST 15 - 37 U/L 26 27 22   ALT 12 - 78 U/L 15 14 14   Alk Phos 45 - 117 U/L 264 (H) 190 (H) 228 (H)   Bili, Total 0.2 - 1.0 MG/DL 1.9 (H) 2.1 (H) 2.5 (H)   Bili, Direct 0.0 - 0.2 MG/DL 0.8 (H) 1.0 (H) 1.0 (H)   Albumin 3.5 - 5.0 g/dL 3.0 (L) 2.9 (L) 2.6 (L)   BUN 6 - 20 MG/DL 24 (H) 12 18   Creat 0.55 - 1.02 MG/DL 1.16 (H) 0.82 0.90   Na 136 - 145 mmol/L 143 140 140   K 3.5 - 5.1 mmol/L 4.1 4.1 3.9   Cl 97 - 108 mmol/L 112 (H) 111 (H) 109 (H)   CO2 21 - 32 mmol/L 24 26 27   Glucose 65 - 100 mg/dL 88 105 (H) 100     Cancer Screening Latest Ref Rng & Units 6/9/2022 11/29/2021 5/17/2021   AFP, Serum 0.0 - 9.2 ng/mL 6.3 3.4 3.9   AFP-L3% 0.0 - 9.9 % 20.6 (H) Comment Comment     Laboratory testing from 6/09/2022 reviewed in detail. Additional testing included to evaluate progression or regression of disease. Laboratory testing results from today will be communicated by mail. SEROLOGIES:  8/2018. HBsurface antigen negative, anti-HBcore total negative, anti-HBsurface negative, anti-HCV negative, NEYMAR negative    Serologies Latest Ref Rng & Units 10/12/2018   NEYMAR, IFA  Negative   ASMCA 0 - 19 Units 73 (H)   M2 Ab 0.0 - 20.0 Units 25.2 (H)     LIVER HISTOLOGY:  10/2019. [facility]. EkPa was 13.0. IQR/med 18%. . The results suggested a fibrosis level of F3-4. The CAP score suggests mild fatty liver. ENDOSCOPIC PROCEDURES:  3/2017. Colonoscopy by Dr Lennox Pian. No polyps. 3/2017. EGD by Dr Lennox Pian. Grade 1 esophageal varices. Mild portal gastropathy. 3/2019 Small esophageal varices. No banding performed. Mild portal hypertensive gastropathy. Gastritis of the antrum. No gastric varices identified. RADIOLOGY:  10/2018. Ultrasound of liver. Echogenic consistent with cirrhosis. No liver mass lesions. No dilated bile ducts. No ascites. 6/2019. Ultrasound of liver. Echogenic consistent with cirrhosis. No liver mass lesions. No dilated bile ducts. No ascites. 9/2020. MRI of liver. Cirrhotic appearing liver. CBD 9 mm without stone. No liver mass or lesion. No ascites. 12/2021. CT of abdomen. Cirrhotic appearing liver. Cholelithiasis.  No ductal dilatation. No liver mass or lesion. No ascites. Small right pleural effusion. OTHER TESTING:  Not available or performed    FOLLOW-UP:  All of the issues listed above in the Assessment and Plan were discussed with the patient. All questions were answered. The patient expressed a clear understanding of the above. [address] [address] in 3 months for ongoing monitoring and treatment. Will follow-up on CT scan results when available. April SSiddharth Carr, [facility] of [address] [facility] [address] [address] [address], [address] [place] [address] 22.  [facility]

## 2022-10-07 LAB
ALBUMIN SERPL-MCNC: 3 G/DL (ref 3.5–5)
ALBUMIN/GLOB SERPL: 0.7 {RATIO} (ref 1.1–2.2)
ALP SERPL-CCNC: 264 U/L (ref 45–117)
ALT SERPL-CCNC: 15 U/L (ref 12–78)
ANION GAP SERPL CALC-SCNC: 7 MMOL/L (ref 5–15)
AST SERPL-CCNC: 26 U/L (ref 15–37)
BASOPHILS # BLD: 0 K/UL (ref 0–0.1)
BASOPHILS NFR BLD: 1 % (ref 0–1)
BILIRUB DIRECT SERPL-MCNC: 0.8 MG/DL (ref 0–0.2)
BILIRUB SERPL-MCNC: 1.9 MG/DL (ref 0.2–1)
BUN SERPL-MCNC: 24 MG/DL (ref 6–20)
BUN/CREAT SERPL: 21 (ref 12–20)
CALCIUM SERPL-MCNC: 8.8 MG/DL (ref 8.5–10.1)
CHLORIDE SERPL-SCNC: 112 MMOL/L (ref 97–108)
CO2 SERPL-SCNC: 24 MMOL/L (ref 21–32)
CREAT SERPL-MCNC: 1.16 MG/DL (ref 0.55–1.02)
DIFFERENTIAL METHOD BLD: ABNORMAL
EOSINOPHIL # BLD: 0.1 K/UL (ref 0–0.4)
EOSINOPHIL NFR BLD: 2 % (ref 0–7)
ERYTHROCYTE [DISTWIDTH] IN BLOOD BY AUTOMATED COUNT: 14.4 % (ref 11.5–14.5)
GLOBULIN SER CALC-MCNC: 4.4 G/DL (ref 2–4)
GLUCOSE SERPL-MCNC: 88 MG/DL (ref 65–100)
HCT VFR BLD AUTO: 38.3 % (ref 35–47)
HGB BLD-MCNC: 12.9 G/DL (ref 11.5–16)
IMM GRANULOCYTES # BLD AUTO: 0 K/UL (ref 0–0.04)
IMM GRANULOCYTES NFR BLD AUTO: 0 % (ref 0–0.5)
INR PPP: 1.2 (ref 0.9–1.1)
LYMPHOCYTES # BLD: 0.8 K/UL (ref 0.8–3.5)
LYMPHOCYTES NFR BLD: 18 % (ref 12–49)
MCH RBC QN AUTO: 33.6 PG (ref 26–34)
MCHC RBC AUTO-ENTMCNC: 33.7 G/DL (ref 30–36.5)
MCV RBC AUTO: 99.7 FL (ref 80–99)
MONOCYTES # BLD: 0.5 K/UL (ref 0–1)
MONOCYTES NFR BLD: 12 % (ref 5–13)
NEUTS SEG # BLD: 2.8 K/UL (ref 1.8–8)
NEUTS SEG NFR BLD: 67 % (ref 32–75)
NRBC # BLD: 0 K/UL (ref 0–0.01)
NRBC BLD-RTO: 0 PER 100 WBC
PLATELET # BLD AUTO: 111 K/UL (ref 150–400)
PMV BLD AUTO: 10.6 FL (ref 8.9–12.9)
POTASSIUM SERPL-SCNC: 4.1 MMOL/L (ref 3.5–5.1)
PROT SERPL-MCNC: 7.4 G/DL (ref 6.4–8.2)
PROTHROMBIN TIME: 12.4 SEC (ref 9–11.1)
RBC # BLD AUTO: 3.84 M/UL (ref 3.8–5.2)
RBC MORPH BLD: ABNORMAL
RBC MORPH BLD: ABNORMAL
SODIUM SERPL-SCNC: 143 MMOL/L (ref 136–145)
WBC # BLD AUTO: 4.2 K/UL (ref 3.6–11)

## 2022-10-11 ENCOUNTER — DOCUMENTATION ONLY (OUTPATIENT)
Dept: HEMATOLOGY | Age: 77
End: 2022-10-11

## 2022-10-11 DIAGNOSIS — R77.2 ELEVATED ALPHA-FETOPROTEIN: ICD-10-CM

## 2022-10-11 DIAGNOSIS — K74.69 OTHER CIRRHOSIS OF LIVER (HCC): Primary | ICD-10-CM

## 2022-10-11 LAB
AFP L3 MFR SERPL: 30.2 % (ref 0–9.9)
AFP SERPL-MCNC: 63.4 NG/ML (ref 0–9.2)

## 2022-10-11 NOTE — PROGRESS NOTES
Called patient and left message regarding blood work results. CT scan was ordered 6/2022 and not yet completed. The AFP was rising at that time and has now increased further. Stressed importance of having this done. Liver enzymes and function are stable. The sr. Creatinine increased slightly.

## 2022-10-18 ENCOUNTER — HOSPITAL ENCOUNTER (OUTPATIENT)
Dept: CT IMAGING | Age: 77
Discharge: HOME | End: 2022-10-18
Payer: MEDICARE

## 2022-10-18 DIAGNOSIS — K74.69 OTHER CIRRHOSIS OF LIVER (HCC): ICD-10-CM

## 2022-10-18 DIAGNOSIS — R77.2 ELEVATED ALPHA-FETOPROTEIN: ICD-10-CM

## 2022-10-18 PROCEDURE — 74170 CT ABD WO CNTRST FLWD CNTRST: CPT

## 2022-10-18 PROCEDURE — 74011000636 HC RX REV CODE- 636: Performed by: NURSE PRACTITIONER

## 2022-10-18 RX ADMIN — IOPAMIDOL 100 ML: 755 INJECTION, SOLUTION INTRAVENOUS at 13:40

## 2022-10-19 ENCOUNTER — TELEPHONE (OUTPATIENT)
Dept: HEMATOLOGY | Age: 77
End: 2022-10-19

## 2022-10-19 NOTE — TELEPHONE ENCOUNTER
Called patient to discuss IR consult and options for liver lesion treatment. Mailed education pamphlet and information sheet to patient's home address. Called IR scheduling and scheduled patient's consult for November 1st at 2pm.     3:50pm - returned call to patient and shared that consult is scheduled for 11/1/22 at 2pm. Check in at inpatient/admissions department at 1:45pm - come into [facility] main entrance to the appt. Patient verbalized understanding.

## 2022-10-19 NOTE — PROGRESS NOTES
Called patient to discuss recent finding on CT scan which is positive for [address] Will set her up for radiology consult and treatment.

## 2022-11-01 ENCOUNTER — HOSPITAL ENCOUNTER (OUTPATIENT)
Dept: INTERVENTIONAL RADIOLOGY/VASCULAR | Age: 77
Discharge: HOME | End: 2022-11-01
Attending: NURSE PRACTITIONER | Admitting: RADIOLOGY
Payer: MEDICARE

## 2022-11-01 ENCOUNTER — HOSPITAL ENCOUNTER (OUTPATIENT)
Dept: RADIATION THERAPY | Age: 77
Discharge: HOME | End: 2022-11-01

## 2022-11-01 DIAGNOSIS — C22.0 HEPATOCELLULAR CARCINOMA (HCC): ICD-10-CM

## 2022-11-01 PROCEDURE — 99212 OFFICE O/P EST SF 10 MIN: CPT

## 2022-11-29 DIAGNOSIS — K74.3 PRIMARY BILIARY CHOLANGITIS (HCC): ICD-10-CM

## 2022-11-29 RX ORDER — URSODIOL 500 MG/1
TABLET, FILM COATED ORAL
Qty: 270 TABLET | Refills: 3 | Status: SHIPPED | OUTPATIENT
Start: 2022-11-29

## 2022-12-09 ENCOUNTER — TRANSCRIBE ORDER (OUTPATIENT)
Dept: SCHEDULING | Age: 77
End: 2022-12-09

## 2022-12-09 DIAGNOSIS — C22.0 HEPATOCELLULAR CARCINOMA (HCC): Primary | ICD-10-CM

## 2022-12-15 ENCOUNTER — TELEPHONE (OUTPATIENT)
Dept: HEMATOLOGY | Age: 77
End: 2022-12-15

## 2022-12-15 NOTE — TELEPHONE ENCOUNTER
Called patient to remind of follow up appt 12/16 with April. Patient states her appt is 2/14/23. She would like to speak with Miya Palma regarding this.  Patient requested to cancel appt tomorrow

## 2022-12-15 NOTE — TELEPHONE ENCOUNTER
Called patient and left  re: appt rescheduling, until there is a date for liver mass treatment the date of FU is still unsure. I will follow with you through next week when mapping procedure is completed to see what the date of treatment is determined to be - then we can schedule FU imaging and appt with Ms Carr.

## 2022-12-20 ENCOUNTER — APPOINTMENT (OUTPATIENT)
Dept: NUCLEAR MEDICINE | Age: 77
End: 2022-12-20
Attending: RADIOLOGY
Payer: MEDICARE

## 2022-12-20 ENCOUNTER — HOSPITAL ENCOUNTER (OUTPATIENT)
Dept: INTERVENTIONAL RADIOLOGY/VASCULAR | Age: 77
Discharge: HOME | End: 2022-12-20
Attending: RADIOLOGY | Admitting: RADIOLOGY
Payer: MEDICARE

## 2022-12-20 VITALS
OXYGEN SATURATION: 96 % | HEIGHT: 62 IN | TEMPERATURE: 97.5 F | BODY MASS INDEX: 32.39 KG/M2 | DIASTOLIC BLOOD PRESSURE: 69 MMHG | RESPIRATION RATE: 18 BRPM | HEART RATE: 85 BPM | WEIGHT: 176 LBS | SYSTOLIC BLOOD PRESSURE: 96 MMHG

## 2022-12-20 DIAGNOSIS — C22.0 HEPATOCELLULAR CARCINOMA (HCC): ICD-10-CM

## 2022-12-20 PROCEDURE — 74011000250 HC RX REV CODE- 250: Performed by: RADIOLOGY

## 2022-12-20 PROCEDURE — 2709999900 HC NON-CHARGEABLE SUPPLY

## 2022-12-20 PROCEDURE — A9540 TC99M MAA: HCPCS

## 2022-12-20 PROCEDURE — C1760 CLOSURE DEV, VASC: HCPCS

## 2022-12-20 PROCEDURE — 77030012468 HC VLV BLEEDBK CNTRL ABBT -B

## 2022-12-20 PROCEDURE — 76942 ECHO GUIDE FOR BIOPSY: CPT

## 2022-12-20 PROCEDURE — 74011250636 HC RX REV CODE- 250/636: Performed by: RADIOLOGY

## 2022-12-20 PROCEDURE — C1769 GUIDE WIRE: HCPCS

## 2022-12-20 PROCEDURE — C1894 INTRO/SHEATH, NON-LASER: HCPCS

## 2022-12-20 PROCEDURE — C1887 CATHETER, GUIDING: HCPCS

## 2022-12-20 PROCEDURE — 36247 INS CATH ABD/L-EXT ART 3RD: CPT

## 2022-12-20 PROCEDURE — 74011000636 HC RX REV CODE- 636

## 2022-12-20 RX ORDER — FENTANYL CITRATE 50 UG/ML
25-200 INJECTION, SOLUTION INTRAMUSCULAR; INTRAVENOUS
Status: DISCONTINUED | OUTPATIENT
Start: 2022-12-20 | End: 2022-12-20

## 2022-12-20 RX ORDER — FLUMAZENIL 0.1 MG/ML
0.5 INJECTION INTRAVENOUS
Status: DISCONTINUED | OUTPATIENT
Start: 2022-12-20 | End: 2022-12-20

## 2022-12-20 RX ORDER — LIDOCAINE HYDROCHLORIDE 10 MG/ML
10 INJECTION INFILTRATION; PERINEURAL
Status: COMPLETED | OUTPATIENT
Start: 2022-12-20 | End: 2022-12-20

## 2022-12-20 RX ORDER — SODIUM CHLORIDE 9 MG/ML
25 INJECTION, SOLUTION INTRAVENOUS
Status: DISCONTINUED | OUTPATIENT
Start: 2022-12-20 | End: 2022-12-20

## 2022-12-20 RX ORDER — MIDAZOLAM HYDROCHLORIDE 1 MG/ML
.5-5 INJECTION, SOLUTION INTRAMUSCULAR; INTRAVENOUS
Status: DISCONTINUED | OUTPATIENT
Start: 2022-12-20 | End: 2022-12-20

## 2022-12-20 RX ORDER — NALOXONE HYDROCHLORIDE 0.4 MG/ML
1 INJECTION, SOLUTION INTRAMUSCULAR; INTRAVENOUS; SUBCUTANEOUS ONCE
Status: DISCONTINUED | OUTPATIENT
Start: 2022-12-20 | End: 2022-12-20

## 2022-12-20 RX ADMIN — CEFAZOLIN 2 G: 1 INJECTION, POWDER, FOR SOLUTION INTRAMUSCULAR; INTRAVENOUS at 11:07

## 2022-12-20 RX ADMIN — LIDOCAINE HYDROCHLORIDE 4 ML: 10 INJECTION, SOLUTION INFILTRATION; PERINEURAL at 12:31

## 2022-12-20 RX ADMIN — SODIUM CHLORIDE 25 ML/HR: 9 INJECTION, SOLUTION INTRAVENOUS at 11:07

## 2022-12-20 RX ADMIN — FENTANYL CITRATE 25 MCG: 50 INJECTION INTRAMUSCULAR; INTRAVENOUS at 12:30

## 2022-12-20 RX ADMIN — IOPAMIDOL 74 ML: 612 INJECTION, SOLUTION INTRAVENOUS at 13:07

## 2022-12-20 RX ADMIN — MIDAZOLAM HYDROCHLORIDE 1 MG: 1 INJECTION, SOLUTION INTRAMUSCULAR; INTRAVENOUS at 12:30

## 2022-12-20 NOTE — ROUTINE PROCESS
1045: Pt arrives via wheelchair to angio department accompanied by daughter Lydia Delarosa for Mapping (hepatic and mesenteric arteriogram with possible embolization injection of macro-aggregated albumin for nuclear medicine imaging) procedure. All assessments completed and consent was reviewed. Education given was regarding procedure, moderate sedation, post-procedure care and  management/follow-up. Opportunity for questions was provided and all questions and concerns were addressed. 1315: Pt transported to nuclear [facility]. 1430: Name of procedure: MAPPING    Sedation medication given: 1 mg versed and 25 mcg fent    Sedation tolerated: well    Total sedation time: 32 minutes    Vital Signs: stable    Any complications related to procedure: see orders    Post procedure Care Needed/order sets placed in connect care: see orders    Pt tolerated procedure well. VSS. No C/O pain. Dressing site CDI. No bleeding or hematoma noted on site. IV D/Cd. Discharge instructions given. Copy on chart and copy given to patient. Pt verbalized understanding. Pt taken to car via wheelchair. NAD noted at time of discharge.

## 2022-12-20 NOTE — H&P
Interventional and Vascular Radiology History and Physical    Patient: Kriss Rojas 68 y.o. female       Chief Complaint: No chief complaint on file. History of Present Illness: hepatoma     History:    Past Medical History:   Diagnosis Date    Liver cirrhosis ([address]      No family history on file. Social History     Socioeconomic History    Marital status:      Spouse name: Not on file    Number of children: Not on file    Years of education: Not on file    Highest education level: Not on file   Occupational History    Not on file   Tobacco Use    Smoking status: Never    Smokeless tobacco: Never   Vaping Use    Vaping Use: Never used   Substance and Sexual Activity    Alcohol use: No    Drug use: No    Sexual activity: Not on file   Other Topics Concern    Not on file   Social History Narrative    Not on file     Social Determinants of Health     Financial Resource Strain: Not on file   Food Insecurity: Not on file   Transportation Needs: Not on file   Physical Activity: Not on file   Stress: Not on file   Social Connections: Not on file   Intimate Partner Violence: Not on file   Housing Stability: Not on file       Allergies: Allergies   Allergen Reactions    Latex Hives    Sulfa (Sulfonamide Antibiotics) Nausea and Vomiting       Current Medications:  No current facility-administered medications for this encounter. Physical Exam:  Blood pressure 96/69, pulse 85, temperature 97.5 °F (36.4 °C), resp. rate 18, height 5' 2\" (1.575 m), weight 79.8 kg (176 lb), SpO2 96 %, not currently breastfeeding. LUNG: clear to auscultation bilaterally, HEART: regular rate and rhythm, S1, S2 normal, no murmur, click, rub or gallop      Alerts:    Hospital Problems  Date Reviewed: 10/8/2022   None      Laboratory:    No results for input(s): HGB, HCT, WBC, PLT, INR, BUN, CREA, K, CRCLT, HGBEXT, HCTEXT, PLTEXT, INREXT in the last 72 hours.     No lab exists for component: PTT, PT      Plan of Care/Planned Procedure:  Risks, benefits, and alternatives reviewed with patient and she agrees to proceed with the procedure. Conscious sedation will be performed with IV fentanyl and versed.  Plan is for liver mapping arteriogram       Sony Chowdhury MD

## 2022-12-20 NOTE — PROGRESS NOTES
Patient allergies verbally reviewed by staff and providers during/before procedural timeout. Prior to opening products, dual verification was performed by IR staff to confirm Latex free contents. No contraindications noted, see LDA/Implants for product information.

## 2022-12-20 NOTE — DISCHARGE INSTRUCTIONS
Angiogram: What to Expect at Home  Your Recovery  An angiogram is an X-ray test that uses dye and a camera to take pictures of the blood flow in an artery or a vein. The doctor inserted a thin, flexible tube (catheter) into a blood vessel in your groin. In some cases, the catheter is placed in a blood vessel in the arm. An angiogram is done for many reasons. For example, you may have an angiogram to find the source of bleeding, such as an ulcer. Or it may be done to look for blocked blood vessels in your lungs. After an angiogram, your groin or arm may have a bruise and feel sore for a day or two. You can do light activities around the house but nothing strenuous for several days. Your doctor may give you specific instructions on when you can do your normal activities again, such as driving and going back to work. This care sheet gives you a general idea about how long it will take for you to recover. But each person recovers at a different pace. Follow the steps below to feel better as quickly as possible. How can you care for yourself at home? Activity    Do not do strenuous exercise and do not lift, pull, or push anything heavy until your doctor says it is okay. This may be for a day or two. You can walk around the house and do light activity, such as cooking. If the catheter was placed in your groin, try not to walk up stairs for the first couple of days. If the catheter was placed in your arm near your wrist, do not bend your wrist deeply for the first couple of days. Be careful using your hand to get into and out of a chair or bed. If your doctor recommends it, get more exercise. Walking is a good choice. Bit by bit, increase the amount you walk every day. Try for at least 30 minutes on most days of the week. Diet    Drink plenty of fluids to help your body flush out the dye.  If you have kidney, heart, or liver disease and have to limit fluids, talk with your doctor before you increase the amount of fluids you drink. You can eat your normal diet. If your stomach is upset, try bland, low-fat foods like plain rice, broiled chicken, toast, and yogurt. Medicines    Be safe with medicines. Read and follow all instructions on the label. If the doctor gave you a prescription medicine for pain, take it as prescribed. If you are not taking a prescription pain medicine, ask your doctor if you can take an over-the-counter medicine. If you stopped taking aspirin or some other blood thinner, your doctor will tell you when to start taking it again. Your doctor will tell you if and when you can restart your medicines. He or she will also give you instructions about taking any new medicines. Care of the catheter site    You will have a dressing over the cut (incision). A dressing helps the incision heal and protects it. Your doctor will tell you how to take care of this. Put ice or a cold pack on the area for 10 to 20 minutes at a time to help with soreness or swelling. Put a thin cloth between the ice and your skin. You may shower 24 to 48 hours after the procedure, if your doctor okays it. Pat the incision dry. Do not soak the catheter site until it is healed. Don't take a bath for 1 week, or until your doctor tells you it is okay. Watch for bleeding from the site. A small amount of blood (up to the size of a quarter) on the bandage can be normal.     If you are bleeding, lie down and press on the area for 15 minutes to try to make it stop. If the bleeding does not stop, call your doctor or seek immediate medical care. Follow-up care is a key part of your treatment and safety. Be sure to make and go to all appointments, and call your doctor if you are having problems. It's also a good idea to know your test results and keep a list of the medicines you take. When should you call for help? Call 911 anytime you think you may need emergency care.  For example, call if:    You passed out (lost consciousness). You have severe trouble breathing. You have sudden chest pain and shortness of breath, or you cough up blood. Call your doctor now or seek immediate medical care if:    You are bleeding from the area where the catheter was put in your artery. You have a fast-growing, painful lump at the catheter site. You have signs of infection, such as: Increased pain, swelling, warmth, or redness. Red streaks leading from the incision. Pus draining from the incision. A fever. Watch closely for any changes in your health, and be sure to contact your doctor if:    You don't get better as expected. Where can you learn more? Go to http://www.gray.com/  Enter [id number] in the search box to learn more about \"Angiogram: What to Expect at Home. \"  Current as of: April 5, 2022               Content Version: 13.4  © 2006-2022 [employer]. Care instructions adapted under license by [employer] (which disclaims liability or warranty for this information). If you have questions about a medical condition or this instruction, always ask your healthcare professional. [place] [address] any warranty or liability for your use of this information. You have received sedation medications today. YOU SHOULD NOT DRIVE FOR 24 HOURS, DO NOT OPERATE HEAVY MACHINERY, DO NOT MAKE ANY LEGAL DECISIONS OR SIGN LEGAL DOCUMENTS FOR 24 HOURS. DO NOT DRINK ALCOHOL, TAKE ANY MEDICATIONS UNLESS PRESCRIBED BY YOUR DOCTOR. IF YOU ARE A CAREGIVER, SOMEONE SHOULD TAKE THAT ROLE FOR 24 HOURS. Side effects of sedation medications and other medications used today have been reviewed  Those side effects can include but are not limited to: dizziness, drowsiness, poor balance, fatigue, sleepiness. Take precautions at home to prevent falls, such as assistance with walking or stairs if allowed and /or when needed or position changes.  Allergic or adverse reactions could include nausea, itching, hives, dizziness, or anything else out of the ordinary. Should you experience any of these significant changes, please call [phone] between the hours of 7:30 am and 3:30 pm or [phone] after hours. After hours, ask the  to page the X-ray Technologist, and describe the problem to the technologist. If you are experiencing chest pain, shortness of breath, altered mental state, unusual bleeding or any other emergent symptom you should call 911 immediately.

## 2022-12-22 DIAGNOSIS — C22.0 HEPATOCELLULAR CARCINOMA (HCC): Primary | ICD-10-CM

## 2022-12-23 ENCOUNTER — HOSPITAL ENCOUNTER (OUTPATIENT)
Dept: RADIATION THERAPY | Age: 77
Discharge: HOME | End: 2022-12-23
Payer: MEDICARE

## 2022-12-23 PROCEDURE — 77470 SPECIAL RADIATION TREATMENT: CPT

## 2022-12-27 ENCOUNTER — HOSPITAL ENCOUNTER (OUTPATIENT)
Dept: RADIATION THERAPY | Age: 77
End: 2022-12-27

## 2022-12-28 NOTE — OP NOTES
Spoke with pt regarding appointment for y-90 treatment tomorrow. She stated she had gotten a message from insurance stating that the procedure is not yet approved and that she needed to postpone the procedure.

## 2022-12-29 ENCOUNTER — HOSPITAL ENCOUNTER (OUTPATIENT)
Dept: RADIATION THERAPY | Age: 77
End: 2022-12-29
Attending: NURSE PRACTITIONER | Admitting: RADIOLOGY
Payer: MEDICARE

## 2022-12-29 ENCOUNTER — HOSPITAL ENCOUNTER (OUTPATIENT)
Dept: INTERVENTIONAL RADIOLOGY/VASCULAR | Age: 77
Discharge: HOSPICE | End: 2022-12-29
Attending: NURSE PRACTITIONER | Admitting: RADIOLOGY
Payer: MEDICARE

## 2022-12-29 VITALS
TEMPERATURE: 97.6 F | OXYGEN SATURATION: 97 % | DIASTOLIC BLOOD PRESSURE: 88 MMHG | HEART RATE: 83 BPM | SYSTOLIC BLOOD PRESSURE: 102 MMHG | RESPIRATION RATE: 24 BRPM

## 2022-12-29 DIAGNOSIS — C22.0 HEPATOCELLULAR CARCINOMA (HCC): ICD-10-CM

## 2022-12-29 PROCEDURE — C1894 INTRO/SHEATH, NON-LASER: HCPCS

## 2022-12-29 PROCEDURE — C1887 CATHETER, GUIDING: HCPCS

## 2022-12-29 PROCEDURE — 75726 ARTERY X-RAYS ABDOMEN: CPT

## 2022-12-29 PROCEDURE — 74011250636 HC RX REV CODE- 250/636: Performed by: RADIOLOGY

## 2022-12-29 PROCEDURE — 74011000636 HC RX REV CODE- 636

## 2022-12-29 PROCEDURE — 77030012468 HC VLV BLEEDBK CNTRL ABBT -B

## 2022-12-29 PROCEDURE — C1760 CLOSURE DEV, VASC: HCPCS

## 2022-12-29 PROCEDURE — 77300 RADIATION THERAPY DOSE PLAN: CPT

## 2022-12-29 PROCEDURE — 77790 RADIATION HANDLING: CPT

## 2022-12-29 PROCEDURE — 2709999900 HC NON-CHARGEABLE SUPPLY

## 2022-12-29 PROCEDURE — 74011000250 HC RX REV CODE- 250: Performed by: RADIOLOGY

## 2022-12-29 PROCEDURE — C1769 GUIDE WIRE: HCPCS

## 2022-12-29 PROCEDURE — C2616 BRACHYTX, NON-STR,YTTRIUM-90: HCPCS

## 2022-12-29 RX ORDER — LIDOCAINE HYDROCHLORIDE 10 MG/ML
10 INJECTION INFILTRATION; PERINEURAL
Status: COMPLETED | OUTPATIENT
Start: 2022-12-29 | End: 2022-12-29

## 2022-12-29 RX ORDER — SODIUM CHLORIDE 9 MG/ML
50 INJECTION, SOLUTION INTRAVENOUS CONTINUOUS
Status: DISCONTINUED | OUTPATIENT
Start: 2022-12-29 | End: 2022-12-29

## 2022-12-29 RX ORDER — FENTANYL CITRATE 50 UG/ML
200 INJECTION, SOLUTION INTRAMUSCULAR; INTRAVENOUS
Status: DISCONTINUED | OUTPATIENT
Start: 2022-12-29 | End: 2022-12-29

## 2022-12-29 RX ORDER — MIDAZOLAM HYDROCHLORIDE 1 MG/ML
5 INJECTION, SOLUTION INTRAMUSCULAR; INTRAVENOUS
Status: DISCONTINUED | OUTPATIENT
Start: 2022-12-29 | End: 2022-12-29

## 2022-12-29 RX ADMIN — MIDAZOLAM 0.5 MG: 1 INJECTION INTRAMUSCULAR; INTRAVENOUS at 11:53

## 2022-12-29 RX ADMIN — FENTANYL CITRATE 25 MCG: 50 INJECTION INTRAMUSCULAR; INTRAVENOUS at 11:53

## 2022-12-29 RX ADMIN — LIDOCAINE HYDROCHLORIDE 10 ML: 10 INJECTION, SOLUTION INFILTRATION; PERINEURAL at 12:20

## 2022-12-29 RX ADMIN — WATER 2 G: 1 INJECTION INTRAMUSCULAR; INTRAVENOUS; SUBCUTANEOUS at 11:42

## 2022-12-29 RX ADMIN — IOPAMIDOL 65 ML: 612 INJECTION, SOLUTION INTRAVENOUS at 12:28

## 2022-12-29 NOTE — PROGRESS NOTES
Patient allergies verbally reviewed by staff and providers during/before procedural timeout. Prior to opening products, dual verification was performed by IR staff to confirm Latex free contents. No contraindications noted, see LDA/Implants for product information. Pt arrives ambulatory to angio department accompanied by self for Y-90 theraspheres  procedure. All assessments completed and consent was reviewed. Education given was regarding procedure, moderate sedation, post-procedure care and  management/follow-up. Opportunity for questions was provided and all questions and concerns were addressed.

## 2022-12-29 NOTE — DISCHARGE INSTRUCTIONS
You have received sedation medications today. YOU SHOULD NOT DRIVE FOR 24 HOURS, DO NOT OPERATE HEAVY MACHINERY, DO NOT MAKE ANY LEGAL DECISIONS OR SIGN LEGAL DOCUMENTS FOR 24 HOURS. DO NOT DRINK ALCOHOL, TAKE ANY MEDICATIONS UNLESS PRESCRIBED BY YOUR DOCTOR. IF YOU ARE A CAREGIVER, SOMEONE SHOULD TAKE THAT ROLE FOR 24 HOURS. Side effects of sedation medications and other medications used today have been reviewed  Those side effects can include but are not limited to: dizziness, drowsiness, poor balance, fatigue, sleepiness. Take precautions at home to prevent falls, such as assistance with walking or stairs if allowed and /or when needed or position changes. Allergic or adverse reactions could include nausea, itching, hives, dizziness, or anything else out of the ordinary. Should you experience any of these significant changes, please call [phone] between the hours of 7:30 am and 3:30 pm or [phone] after hours. After hours, ask the  to page the X-ray Technologist, and describe the problem to the technologist. If you are experiencing chest pain, shortness of breath, altered mental state, unusual bleeding or any other emergent symptom you should call 911 immediately.

## 2022-12-29 NOTE — H&P
Interventional and Vascular Radiology History and Physical    Patient: Shahida Chicas 68 y.o. female       Chief Complaint: No chief complaint on file. History of Present Illness: HCC     History:    Past Medical History:   Diagnosis Date    Liver cirrhosis ([address]      No family history on file. Social History     Socioeconomic History    Marital status:      Spouse name: Not on file    Number of children: Not on file    Years of education: Not on file    Highest education level: Not on file   Occupational History    Not on file   Tobacco Use    Smoking status: Never    Smokeless tobacco: Never   Vaping Use    Vaping Use: Never used   Substance and Sexual Activity    Alcohol use: No    Drug use: No    Sexual activity: Not on file   Other Topics Concern    Not on file   Social History Narrative    Not on file     Social Determinants of Health     Financial Resource Strain: Not on file   Food Insecurity: Not on file   Transportation Needs: Not on file   Physical Activity: Not on file   Stress: Not on file   Social Connections: Not on file   Intimate Partner Violence: Not on file   Housing Stability: Not on file       Allergies: Allergies   Allergen Reactions    Latex Hives    Sulfa (Sulfonamide Antibiotics) Nausea and Vomiting       Current Medications:  Current Facility-Administered Medications   Medication Dose Route Frequency    iopamidoL (ISOVUE 300) 300 mg iodine /mL (61 %) contrast injection 200 mL  200 mL IntraCATHeter RAD ONCE    0.9% sodium chloride infusion  50 mL/hr IntraVENous CONTINUOUS    fentaNYL citrate (PF) injection 200 mcg  200 mcg IntraVENous Rad Multiple    midazolam (VERSED) injection 5 mg  5 mg IntraVENous Rad Multiple        Physical Exam:  Blood pressure (!) 112/51, pulse 84, temperature 97.6 °F (36.4 °C), resp. rate 21, SpO2 98 %.   LUNG: clear to auscultation bilaterally, HEART: regular rate and rhythm, S1, S2 normal, no murmur, click, rub or gallop      Alerts:    [facility] Problems  Date Reviewed: 10/8/2022   None      Laboratory:    No results for input(s): HGB, HCT, WBC, PLT, INR, BUN, CREA, K, CRCLT, HGBEXT, HCTEXT, PLTEXT, INREXT in the last 72 hours. No lab exists for component: PTT, PT      Plan of Care/Planned Procedure:  Risks, benefits, and alternatives reviewed with patient and she agrees to proceed with the procedure. Conscious sedation will be performed with IV fentanyl and versed.  Plan is for Petrona Grant MD

## 2022-12-29 NOTE — PROGRESS NOTES
Reviewed discharge instructions w/patient with good understanding. Ride called and will be here at 2pm for . Patient stable.

## 2023-03-01 ENCOUNTER — TELEPHONE (OUTPATIENT)
Dept: HEMATOLOGY | Age: 78
End: 2023-03-01

## 2023-03-01 NOTE — TELEPHONE ENCOUNTER
Called patient to check in, she requests assistance to schedule CT of Abd at the [address] [place] [address]. Called scheduling and set up CT of Abd with and without IV contrast for Wednesday, March 8th - patient to arrive at 9:45am, nothing to eat until after the scan, drink plenty of water, do not take NSAIDs, returned call to patient to provide this information - she wrote this down and verbalized understanding.

## 2023-03-08 ENCOUNTER — HOSPITAL ENCOUNTER (OUTPATIENT)
Dept: CT IMAGING | Age: 78
Discharge: HOME | End: 2023-03-08
Payer: MEDICARE

## 2023-03-08 DIAGNOSIS — C22.0 HEPATOCELLULAR CARCINOMA (HCC): ICD-10-CM

## 2023-03-08 LAB
BUN SERPL-MCNC: 7 MG/DL (ref 6–20)
CREAT SERPL-MCNC: 0.7 MG/DL (ref 0.55–1.02)

## 2023-03-08 PROCEDURE — 74170 CT ABD WO CNTRST FLWD CNTRST: CPT

## 2023-03-08 PROCEDURE — 84520 ASSAY OF UREA NITROGEN: CPT

## 2023-03-08 PROCEDURE — 36415 COLL VENOUS BLD VENIPUNCTURE: CPT

## 2023-03-08 PROCEDURE — 82565 ASSAY OF CREATININE: CPT

## 2023-03-08 PROCEDURE — 74011000636 HC RX REV CODE- 636: Performed by: NURSE PRACTITIONER

## 2023-03-08 RX ADMIN — IOPAMIDOL 100 ML: 755 INJECTION, SOLUTION INTRAVENOUS at 10:56

## 2023-03-09 DIAGNOSIS — C22.0 HEPATOCELLULAR CARCINOMA (HCC): Primary | ICD-10-CM

## 2023-03-13 ENCOUNTER — VIRTUAL VISIT (OUTPATIENT)
Dept: HEMATOLOGY | Age: 78
End: 2023-03-13
Payer: MEDICARE

## 2023-03-13 DIAGNOSIS — K74.69 OTHER CIRRHOSIS OF LIVER (HCC): Primary | ICD-10-CM

## 2023-03-13 DIAGNOSIS — C22.0 HEPATOCELLULAR CARCINOMA (HCC): ICD-10-CM

## 2023-03-13 DIAGNOSIS — K74.3 PRIMARY BILIARY CHOLANGITIS (HCC): ICD-10-CM

## 2023-03-13 PROCEDURE — 1101F PT FALLS ASSESS-DOCD LE1/YR: CPT | Performed by: NURSE PRACTITIONER

## 2023-03-13 PROCEDURE — G8432 DEP SCR NOT DOC, RNG: HCPCS | Performed by: NURSE PRACTITIONER

## 2023-03-13 PROCEDURE — G8427 DOCREV CUR MEDS BY ELIG CLIN: HCPCS | Performed by: NURSE PRACTITIONER

## 2023-03-13 PROCEDURE — 1090F PRES/ABSN URINE INCON ASSESS: CPT | Performed by: NURSE PRACTITIONER

## 2023-03-13 PROCEDURE — 99214 OFFICE O/P EST MOD 30 MIN: CPT | Performed by: NURSE PRACTITIONER

## 2023-03-13 PROCEDURE — G8399 PT W/DXA RESULTS DOCUMENT: HCPCS | Performed by: NURSE PRACTITIONER

## 2023-03-13 PROCEDURE — 1123F ACP DISCUSS/DSCN MKR DOCD: CPT | Performed by: NURSE PRACTITIONER

## 2023-03-13 NOTE — PROGRESS NOTES
Identified pt with two pt identifiers(name and ). Reviewed record in preparation for visit and have obtained necessary documentation. Chief Complaint   Patient presents with    Follow-up      There were no vitals filed for this visit. Health Maintenance Review: Patient reminded of \"due or due soon\" health maintenance. I have asked the patient to contact his/her primary care provider (PCP) for follow-up on his/her health maintenance. Coordination of Care Questionnaire:  :   1) Have you been to an emergency room, urgent care, or hospitalized since your last visit? If yes, where when, and reason for visit? no       2. Have seen or consulted any other health care provider since your last visit? If yes, where when, and reason for visit? NO      Patient is accompanied by self I have received verbal consent from Alexis Smith to discuss any/all medical information while they are present in the room.

## 2023-03-13 NOTE — Clinical Note
NOTIFICATION RETURN TO WORK / SCHOOL    3/13/2023 9:14 PM    Ms. Dagmar Shearer  [address] [place] [id number]      To Whom It May Concern:    Dagmar Shearer is currently under the care of [address]. She will return to work/school on: ***    If there are questions or concerns please have the patient contact our office.         Sincerely,      Lori Ingram NP

## 2023-03-13 NOTE — PROGRESS NOTES
[facility], [place], [address], [place], [place]      RICHARD Cavazos, [facility]-BC   Redia Fees, [facility]-   ANGELES Peter FNP-HUMBLE Aranda, [facility]      [address]   at [address].   [phone]   FAX: [phone]  Liver [place] of [facility]   at [address], [address]   [phone]   FAX: [phone]       Patient Care Team:  None as PCP - General  Momo Carrillo, RN as Care Coordinator (Hepatology)  Lori Carr, NP as Nurse Practitioner (Hepatology)      Problem List  Date Reviewed: 3/13/2023            Codes Class Noted    Severe obesity ([facility] ICD-10-CM: E66.01  ICD-9-CM: 278.01  9/4/2020        Primary biliary cholangitis ([facility] ICD-10-CM: K74.3  ICD-9-CM: 571.6  10/12/2018        Cirrhosis ([facility] ICD-10-CM: K74.60  ICD-9-CM: 571.5  10/12/2018        Thrombocytopenia ([facility] ICD-10-CM: D69.6  ICD-9-CM: 287.5  10/12/2018        Ascites ICD-10-CM: R18.8  ICD-9-CM: 789.59  10/12/2018         VIRTUAL TELEHEALTH VISIT PERFORMED DUE TO COVID-19 EPIDEMIC    CONSENT:    Gaspar Lizbeth, was evaluated through a synchronous (real-time) audio encounter. The patient (or guardian if applicable) is aware that this is a billable service, which includes applicable co-pays. This Virtual Visit was conducted with patient's (and/or legal guardian's) consent. The visit was conducted pursuant to the emergency declaration under the [address] [place] [address] waiver authority and the [employer] and [employer] General Act. Patient identification was verified, and a caregiver was present when appropriate.   The patient was located at: Home  The provider was located at: Chente Ryan returns to the [place] [address] for management of primary biliary cholangitis. The active problem list, all pertinent past medical history, medications, endoscopic studies, radiologic findings and laboratory findings related to the liver disorder were reviewed with the patient. The patient is a 68 y.o.  female who was found to have chronic liver disease, cirrhosis and PBC in 2017 when she developed ascites. Assessment of liver fibrosis was performed with Fibroscan 10/2019. The result was 13.0 kPa which correlates with Bridging fibrosis-cirrhosis. The CAP score of 256 suggests mild fatty liver. Serologic evaluation for markers of chronic liver disease was positive for AMA. The patient is currently receiving treatment for PBC with JOLIE 1500 mg daily    OCA was discontinued 7/2021 due to a change in indication with the company. The patient had been visiting with family in [place]. The  AFP L3 increased prior to her leaving 6/2022. A triple phase CT scan was ordered but not completed. Since the last office visit the patient was found to have a 3.1 x 2.1 cm arterially enhancing lesion with washout in segment 7 consistent with [address] LIRADS 5. A Y90 was performed 12/2022. Imaging was performed with CT of Abdomen 3/2023 which demonstrated a treated lesion. The patient has developed the following complications of cirrhosis: ascites and lower extremity edema which are well controlled on furosemide 40 mg, spironolactone 100 mg. Hepatocellular carcinoma. The patient notes mild itching which is manageable. The patient has not experienced pain in the right side over the liver, dry eyes, dry mouth or dark urine. The patient completes all daily activities with mild to moderate functional limitations due to low back pain. She uses a cane for an assistive device.        ASSESSMENT AND PLAN:  Hepatocellular carcinoma   The diagnosis was made in 10/2022 by triple phase CT. At diagnosis the [address] was 3.1 x 2.1 cm lesion located in the Right Lobe, segment 7. This was stage T2. [address] has been treated with Y-90 TARE 12/2022. The patient tolerated this treatment well with no hepatic decompensation. The most recent imaging was a Triple phase CT performed in 3/2023. This demonstrated no enhancement at the site of the [address] Will schedule for repeat triple phase CT scan to assess response to treatment in 3 months. Chest CT and Bone Scan ordered to evaluate for metastases. Cirrhosis  Cirrhosis is secondary to PBC. The diagnosis of cirrhosis is based upon laboratory studies, imaging and complications of cirrhosis. Elastography performed 10/2019 suggests stage 3-4 fibrosis. Have performed laboratory testing to monitor liver function and degree of liver injury. This included BMP, hepatic panel, CBC with platelet count and INR. Laboratory testing from 10/06/2022 reviewed in detail. Follow-up testing ordered today. The liver transaminases are normal. The ALP is elevated. The liver function is depressed. The platelet count is depressed. The CTP is 8. Child class B  The MELD score is 13. Primary biliary cholangitis  The diagnosis is based upon serology and an elevation in ALP. Serologic testing for causes of chronic liver disease were positive for ASMA and AMA. A liver biopsy has not been performed. The patient has had persistent elevation in ALP despite being on JOLEI 1500 mg daily. Neomia Shelling She was as started on OCA in 6/2019 at a dose of 5 mg Qweek. OCA was discontinued 7/2021. She is not responding to treatment. MRI was performed 9/2020 which demonstrated an enlarged CBC of 9 mm without evidence of stone or stricture. Imaging with abdominal CT 12/2021 notes cholelithiasis with no ductal dilatation. Cirrhotic appearing liver. Will continue JOLIE at 1500 mg daily. Ascites   Intermittent on step 1.    Will continues diuretic therapy with furosemide 40 mg, spironolactone 100 mg. Lower extremity edema  Intermittent on furosemide 40 mg, spironolactone 100 mg. Will continue at the current dose. Screening for esophageal varices   The patient has small esophageal varices without prior bleeding. EGD was performed by Dr. Guille Hawthorne 4/08/2021. Small esophageal varices found but not banded. EGD will be scheduled. Hepatic encephalopathy   This has not developed to date. There is no need for treatment with lactulose and/or Xifaxan at this time. No need to restrict dietary protein at this time. Itching   This is secondary to the underlying liver disease. It is mild and the patient does not feel like she needs any medical treatment for this at this time. Essential lavender oil is helping her with this symptom. Screening for hepatocellular carcinoma  HCC screening last performed with CT scan 12/2021 and was negative for [address] AFP L3 elevated 6/2022. Imaging 10/2022 demonstrated [address] See above for details. Treatment of other medical problems in patients with chronic liver disease  There are no contraindications for the patient to take any medications that are necessary for treatment of other medical issues. The patient does not consume alcohol daily. Normal doses of acetaminophen can be used for pain as needed. Normal doses of acetaminophen as recommended on the label are not hepatotoxic, even in patients with cirrhosis. The patient has cirrhosis. Patients with cirrhosis should not use NSAIDs if possible as this is associated with a higher rate of ILEANA. Counseling for alcohol in patients with chronic liver disease  The patient does not consume any significant amount of alcohol. Thrombocytopenia   This is secondary to cirrhosis. There is no evidence of overt bleeding. No treatment is required.   The platelet count is adequate for the patient to undergo procedures without the need for platelet transfusion or platelet growth factors. Osteoporosis  The risk of osteoporosis is increased in patients with PBC and cirrhosis. DEXA bone density to assess for osteoporosis has not been performed. This should be ordered by the patient's primary care physician. Vaccinations   Routine vaccinations against other bacterial and viral agents can be performed as indicated. Annual flu vaccination should be administered if indicated. ALLERGIES  Allergies   Allergen Reactions    Latex Hives    Sulfa (Sulfonamide Antibiotics) Nausea and Vomiting     MEDICATIONS  Current Outpatient Medications   Medication Sig    ursodioL (ACTIGALL) 500 mg tablet Take 3 tablets by mouth once daily    furosemide (LASIX) 40 mg tablet Take 1 tablet by mouth once daily    spironolactone (ALDACTONE) 100 mg tablet Take 1 tablet by mouth once daily    latanoprost (XALATAN) 0.005 % ophthalmic solution      No current facility-administered medications for this visit. SYSTEM REVIEW NOT RELATED TO LIVER DISEASE OR REVIEWED ABOVE:  Constitution systems: Negative for fever, chills, weight gain, weight loss. Eyes: Negative for visual changes. ENT: Negative for sore throat, painful swallowing. Respiratory: Negative for cough, hemoptysis, SOB. Cardiology: Negative for chest pain, palpitations. GI:  Negative for constipation or diarrhea. : Negative for urinary frequency, dysuria, hematuria, nocturia. Skin: Negative for rash. Hematology: Negative for easy bruising, blood clots. Musculo-skelatal: Low back pain, myalgias, arthralgias. Weakness   Neurologic: Negative for headaches, dizziness, vertigo, memory problems not related to HE. Psychology: Negative for anxiety, depression. FAMILY HISTORY:  The patient is adopted and does not know any of her family history. SOCIAL HISTORY:  The patient is . The patient has 3 children and 15 grandchildren. The patient has never used tobacco products.     The patient has never consumed significant amounts of alcohol. The patient used to work as . LABORATORY STUDIES:  Liver [place] of [address] Units 10/6/2022 6/9/2022 3/8/2022   WBC 3.6 - 11.0 K/uL 4.2 3.4 (L) 4.7   ANC 1.8 - 8.0 K/UL 2.8 1.7 (L) 3.2   HGB 11.5 - 16.0 g/dL 12.9 12.8 12.0    - 400 K/uL 111 (L) 100 (L) 135 (L)   INR 0.9 - 1.1   1.2 (H) 1.2 (H) 1.2 (H)   AST 15 - 37 U/L 26 27 22   ALT 12 - 78 U/L 15 14 14   Alk Phos 45 - 117 U/L 264 (H) 190 (H) 228 (H)   Bili, Total 0.2 - 1.0 MG/DL 1.9 (H) 2.1 (H) 2.5 (H)   Bili, Direct 0.0 - 0.2 MG/DL 0.8 (H) 1.0 (H) 1.0 (H)   Albumin 3.5 - 5.0 g/dL 3.0 (L) 2.9 (L) 2.6 (L)   BUN 6 - 20 MG/DL 24 (H) 12 18   Creat 0.55 - 1.02 MG/DL 1.16 (H) 0.82 0.90   Na 136 - 145 mmol/L 143 140 140   K 3.5 - 5.1 mmol/L 4.1 4.1 3.9   Cl 97 - 108 mmol/L 112 (H) 111 (H) 109 (H)   CO2 21 - 32 mmol/L 24 26 27   Glucose 65 - 100 mg/dL 88 105 (H) 100     Cancer Screening Latest Ref Rng & Units 10/6/2022 6/9/2022 11/29/2021   AFP, Serum 0.0 - 9.2 ng/mL 63.4 (H) 6.3 3.4   AFP-L3% 0.0 - 9.9 % 30.2 (H) 20.6 (H) Comment     Laboratory testing from 10/06/2022 reviewed in detail. Additional testing included to evaluate progression or regression of disease. Laboratory testing results from today will be communicated by phone or mail. SEROLOGIES:  8/2018. HBsurface antigen negative, anti-HBcore total negative, anti-HBsurface negative, anti-HCV negative, NEYMAR negative    Serologies Latest Ref Rng & Units 10/12/2018   NEYMAR, IFA  Negative   ASMCA 0 - 19 Units 73 (H)   M2 Ab 0.0 - 20.0 Units 25.2 (H)     LIVER HISTOLOGY:  10/2019. [facility]. EkPa was 13.0. IQR/med 18%. . The results suggested a fibrosis level of F3-4. The CAP score suggests mild fatty liver. ENDOSCOPIC PROCEDURES:  3/2017. Colonoscopy by Dr Roby Burger. No polyps. 3/2017. EGD by Dr Roby Burger. Grade 1 esophageal varices. Mild portal gastropathy.   3/2019 Small esophageal varices. No banding performed. Mild portal hypertensive gastropathy. Gastritis of the antrum. No gastric varices identified. RADIOLOGY:  10/2018. Ultrasound of liver. Echogenic consistent with cirrhosis. No liver mass lesions. No dilated bile ducts. No ascites. 6/2019. Ultrasound of liver. Echogenic consistent with cirrhosis. No liver mass lesions. No dilated bile ducts. No ascites. 9/2020. MRI of liver. Cirrhotic appearing liver. CBD 9 mm without stone. No liver mass or lesion. No ascites. 12/2021. CT of abdomen. Cirrhotic appearing liver. Cholelithiasis. No ductal dilatation. No liver mass or lesion. No ascites. Small right pleural effusion. See Above for details. OTHER TESTING:  Not available or performed    FOLLOW-UP AFTER VIRTUAL VISIT:  Pursuant to the emergency declaration under the [facility] waiver authority and the [employer] Act, this Virtual  Visit was conducted, with the patient's (and/or their legal guardian's) consent, to reduce the patient's risk of exposure to COVID-19 and provide necessary medical care. Services were provided through a video synchronous discussion virtually to substitute for an in-person clinic visit. The patient was located in their home. The provider was located in the [place] [address] office. All of the issues listed above in the Assessment and Plan were discussed with the patient. All questions were answered. The patient expressed a clear understanding of the above. Orders to obtain laboratory testing will be mailed to the patient. An in-person follow-up visit will be scheduled at [place] [address] in 3 months. Imaging will be ordered prior to the next visit. April RAQUEL Carr, [facility] of [address] [address] [address]  [facility] [place] [facility].  [phone]  Danyel Marie [facility]

## 2023-04-29 DIAGNOSIS — K74.69 OTHER CIRRHOSIS OF LIVER (HCC): Primary | ICD-10-CM

## 2023-04-29 DIAGNOSIS — C22.0 HEPATOCELLULAR CARCINOMA (HCC): ICD-10-CM

## 2023-05-19 ENCOUNTER — HOSPITAL ENCOUNTER (INPATIENT)
Facility: HOSPITAL | Age: 78
LOS: 5 days | Discharge: NURSING, REHAB OR LONG-TERM CARE | DRG: 542 | End: 2023-05-24
Attending: FAMILY MEDICINE | Admitting: INTERNAL MEDICINE
Payer: MEDICARE

## 2023-05-19 ENCOUNTER — APPOINTMENT (OUTPATIENT)
Facility: HOSPITAL | Age: 78
DRG: 542 | End: 2023-05-19
Payer: MEDICARE

## 2023-05-19 DIAGNOSIS — S32.592A CLOSED FRACTURE OF MULTIPLE RAMI OF LEFT PUBIS, INITIAL ENCOUNTER (HCC): ICD-10-CM

## 2023-05-19 DIAGNOSIS — S32.592A PUBIC RAMUS FRACTURE, LEFT, CLOSED, INITIAL ENCOUNTER (HCC): ICD-10-CM

## 2023-05-19 DIAGNOSIS — R26.2 AMBULATORY DYSFUNCTION: Primary | ICD-10-CM

## 2023-05-19 PROBLEM — T14.8XXA FRACTURE: Status: ACTIVE | Noted: 2023-05-19

## 2023-05-19 LAB
ALBUMIN SERPL-MCNC: 1.9 G/DL (ref 3.5–5)
ALBUMIN/GLOB SERPL: 0.4 (ref 1.1–2.2)
ALP SERPL-CCNC: 131 U/L (ref 45–117)
ALT SERPL-CCNC: 18 U/L (ref 12–78)
ANION GAP SERPL CALC-SCNC: 6 MMOL/L (ref 5–15)
AST SERPL W P-5'-P-CCNC: 31 U/L (ref 15–37)
BASOPHILS # BLD: 0 K/UL (ref 0–0.1)
BASOPHILS NFR BLD: 0 % (ref 0–1)
BILIRUB SERPL-MCNC: 4.6 MG/DL (ref 0.2–1)
BUN SERPL-MCNC: 17 MG/DL (ref 6–20)
BUN/CREAT SERPL: 15 (ref 12–20)
CA-I BLD-MCNC: 8.3 MG/DL (ref 8.5–10.1)
CHLORIDE SERPL-SCNC: 105 MMOL/L (ref 97–108)
CO2 SERPL-SCNC: 25 MMOL/L (ref 21–32)
CREAT SERPL-MCNC: 1.13 MG/DL (ref 0.55–1.02)
DIFFERENTIAL METHOD BLD: ABNORMAL
EOSINOPHIL # BLD: 0 K/UL (ref 0–0.4)
EOSINOPHIL NFR BLD: 1 % (ref 0–7)
ERYTHROCYTE [DISTWIDTH] IN BLOOD BY AUTOMATED COUNT: 16.3 % (ref 11.5–14.5)
GLOBULIN SER CALC-MCNC: 4.4 G/DL (ref 2–4)
GLUCOSE SERPL-MCNC: 95 MG/DL (ref 65–100)
HCT VFR BLD AUTO: 28 % (ref 35–47)
HGB BLD-MCNC: 9.2 G/DL (ref 11.5–16)
IMM GRANULOCYTES # BLD AUTO: 0 K/UL (ref 0–0.04)
IMM GRANULOCYTES NFR BLD AUTO: 0 % (ref 0–0.5)
LYMPHOCYTES # BLD: 0.4 K/UL (ref 0.8–3.5)
LYMPHOCYTES NFR BLD: 7 % (ref 12–49)
MCH RBC QN AUTO: 35.2 PG (ref 26–34)
MCHC RBC AUTO-ENTMCNC: 32.9 G/DL (ref 30–36.5)
MCV RBC AUTO: 107.3 FL (ref 80–99)
MONOCYTES # BLD: 0.6 K/UL (ref 0–1)
MONOCYTES NFR BLD: 12 % (ref 5–13)
NEUTS SEG # BLD: 4.2 K/UL (ref 1.8–8)
NEUTS SEG NFR BLD: 80 % (ref 32–75)
PLATELET # BLD AUTO: 97 K/UL (ref 150–400)
PMV BLD AUTO: 10 FL (ref 8.9–12.9)
POTASSIUM SERPL-SCNC: 3.8 MMOL/L (ref 3.5–5.1)
PROT SERPL-MCNC: 6.3 G/DL (ref 6.4–8.2)
RBC # BLD AUTO: 2.61 M/UL (ref 3.8–5.2)
SODIUM SERPL-SCNC: 136 MMOL/L (ref 136–145)
WBC # BLD AUTO: 5.2 K/UL (ref 3.6–11)

## 2023-05-19 PROCEDURE — 99285 EMERGENCY DEPT VISIT HI MDM: CPT

## 2023-05-19 PROCEDURE — 2580000003 HC RX 258: Performed by: NURSE PRACTITIONER

## 2023-05-19 PROCEDURE — 1100000000 HC RM PRIVATE

## 2023-05-19 PROCEDURE — 73502 X-RAY EXAM HIP UNI 2-3 VIEWS: CPT

## 2023-05-19 PROCEDURE — 80053 COMPREHEN METABOLIC PANEL: CPT

## 2023-05-19 PROCEDURE — 85025 COMPLETE CBC W/AUTO DIFF WBC: CPT

## 2023-05-19 PROCEDURE — 6370000000 HC RX 637 (ALT 250 FOR IP): Performed by: NURSE PRACTITIONER

## 2023-05-19 PROCEDURE — 72131 CT LUMBAR SPINE W/O DYE: CPT

## 2023-05-19 PROCEDURE — 6360000002 HC RX W HCPCS: Performed by: NURSE PRACTITIONER

## 2023-05-19 RX ORDER — LATANOPROST 50 UG/ML
1 SOLUTION/ DROPS OPHTHALMIC NIGHTLY
Status: DISCONTINUED | OUTPATIENT
Start: 2023-05-19 | End: 2023-05-24 | Stop reason: HOSPADM

## 2023-05-19 RX ORDER — SODIUM CHLORIDE 0.9 % (FLUSH) 0.9 %
5-40 SYRINGE (ML) INJECTION PRN
Status: DISCONTINUED | OUTPATIENT
Start: 2023-05-19 | End: 2023-05-24 | Stop reason: HOSPADM

## 2023-05-19 RX ORDER — OXYCODONE HYDROCHLORIDE 5 MG/1
10 TABLET ORAL EVERY 4 HOURS PRN
Status: DISCONTINUED | OUTPATIENT
Start: 2023-05-19 | End: 2023-05-24 | Stop reason: HOSPADM

## 2023-05-19 RX ORDER — TRAMADOL HYDROCHLORIDE 50 MG/1
50 TABLET ORAL EVERY 6 HOURS PRN
Status: DISCONTINUED | OUTPATIENT
Start: 2023-05-19 | End: 2023-05-24 | Stop reason: HOSPADM

## 2023-05-19 RX ORDER — URSODIOL 300 MG/1
1500 CAPSULE ORAL DAILY
Status: DISCONTINUED | OUTPATIENT
Start: 2023-05-20 | End: 2023-05-20

## 2023-05-19 RX ORDER — POLYETHYLENE GLYCOL 3350 17 G/17G
17 POWDER, FOR SOLUTION ORAL DAILY PRN
Status: DISCONTINUED | OUTPATIENT
Start: 2023-05-19 | End: 2023-05-24 | Stop reason: HOSPADM

## 2023-05-19 RX ORDER — SODIUM CHLORIDE 9 MG/ML
INJECTION, SOLUTION INTRAVENOUS PRN
Status: DISCONTINUED | OUTPATIENT
Start: 2023-05-19 | End: 2023-05-24 | Stop reason: HOSPADM

## 2023-05-19 RX ORDER — FUROSEMIDE 40 MG/1
1 TABLET ORAL DAILY
COMMUNITY
Start: 2022-11-18

## 2023-05-19 RX ORDER — ACETAMINOPHEN 650 MG/1
650 SUPPOSITORY RECTAL EVERY 6 HOURS PRN
Status: DISCONTINUED | OUTPATIENT
Start: 2023-05-19 | End: 2023-05-19

## 2023-05-19 RX ORDER — ONDANSETRON 4 MG/1
4 TABLET, ORALLY DISINTEGRATING ORAL EVERY 8 HOURS PRN
Status: DISCONTINUED | OUTPATIENT
Start: 2023-05-19 | End: 2023-05-24 | Stop reason: HOSPADM

## 2023-05-19 RX ORDER — ACETAMINOPHEN 325 MG/1
650 TABLET ORAL EVERY 6 HOURS PRN
Status: DISCONTINUED | OUTPATIENT
Start: 2023-05-19 | End: 2023-05-19

## 2023-05-19 RX ORDER — URSODIOL 500 MG/1
TABLET, FILM COATED ORAL
COMMUNITY
Start: 2023-03-01

## 2023-05-19 RX ORDER — ONDANSETRON 2 MG/ML
4 INJECTION INTRAMUSCULAR; INTRAVENOUS EVERY 6 HOURS PRN
Status: DISCONTINUED | OUTPATIENT
Start: 2023-05-19 | End: 2023-05-24 | Stop reason: HOSPADM

## 2023-05-19 RX ORDER — SPIRONOLACTONE 100 MG/1
1 TABLET, FILM COATED ORAL DAILY
COMMUNITY
Start: 2019-05-08

## 2023-05-19 RX ORDER — ENOXAPARIN SODIUM 100 MG/ML
40 INJECTION SUBCUTANEOUS DAILY
Status: DISCONTINUED | OUTPATIENT
Start: 2023-05-19 | End: 2023-05-24 | Stop reason: HOSPADM

## 2023-05-19 RX ORDER — SODIUM CHLORIDE 0.9 % (FLUSH) 0.9 %
5-40 SYRINGE (ML) INJECTION EVERY 12 HOURS SCHEDULED
Status: DISCONTINUED | OUTPATIENT
Start: 2023-05-19 | End: 2023-05-24 | Stop reason: HOSPADM

## 2023-05-19 RX ORDER — LATANOPROST 50 UG/ML
SOLUTION/ DROPS OPHTHALMIC
COMMUNITY
Start: 2023-05-12

## 2023-05-19 RX ORDER — OXYCODONE HYDROCHLORIDE 5 MG/1
5 TABLET ORAL EVERY 4 HOURS PRN
Status: DISCONTINUED | OUTPATIENT
Start: 2023-05-19 | End: 2023-05-24 | Stop reason: HOSPADM

## 2023-05-19 RX ADMIN — SODIUM CHLORIDE, PRESERVATIVE FREE 10 ML: 5 INJECTION INTRAVENOUS at 21:12

## 2023-05-19 RX ADMIN — ENOXAPARIN SODIUM 40 MG: 100 INJECTION SUBCUTANEOUS at 21:13

## 2023-05-19 RX ADMIN — LATANOPROST 1 DROP: 50 SOLUTION OPHTHALMIC at 21:13

## 2023-05-19 ASSESSMENT — ENCOUNTER SYMPTOMS
RESPIRATORY NEGATIVE: 1
EYES NEGATIVE: 1
GASTROINTESTINAL NEGATIVE: 1
BACK PAIN: 1

## 2023-05-19 ASSESSMENT — PAIN SCALES - GENERAL
PAINLEVEL_OUTOF10: 0
PAINLEVEL_OUTOF10: 6

## 2023-05-19 ASSESSMENT — PAIN - FUNCTIONAL ASSESSMENT: PAIN_FUNCTIONAL_ASSESSMENT: 0-10

## 2023-05-20 PROCEDURE — 6370000000 HC RX 637 (ALT 250 FOR IP): Performed by: PHYSICIAN ASSISTANT

## 2023-05-20 PROCEDURE — 97165 OT EVAL LOW COMPLEX 30 MIN: CPT

## 2023-05-20 PROCEDURE — 2580000003 HC RX 258: Performed by: NURSE PRACTITIONER

## 2023-05-20 PROCEDURE — 97162 PT EVAL MOD COMPLEX 30 MIN: CPT

## 2023-05-20 PROCEDURE — 97530 THERAPEUTIC ACTIVITIES: CPT

## 2023-05-20 PROCEDURE — 6360000002 HC RX W HCPCS: Performed by: NURSE PRACTITIONER

## 2023-05-20 PROCEDURE — 97163 PT EVAL HIGH COMPLEX 45 MIN: CPT

## 2023-05-20 PROCEDURE — 1100000000 HC RM PRIVATE

## 2023-05-20 RX ORDER — URSODIOL 300 MG/1
600 CAPSULE ORAL
Status: DISCONTINUED | OUTPATIENT
Start: 2023-05-20 | End: 2023-05-24 | Stop reason: HOSPADM

## 2023-05-20 RX ADMIN — ENOXAPARIN SODIUM 40 MG: 100 INJECTION SUBCUTANEOUS at 09:14

## 2023-05-20 RX ADMIN — SODIUM CHLORIDE, PRESERVATIVE FREE 10 ML: 5 INJECTION INTRAVENOUS at 22:04

## 2023-05-20 RX ADMIN — URSODIOL 600 MG: 300 CAPSULE ORAL at 18:31

## 2023-05-20 RX ADMIN — SODIUM CHLORIDE, PRESERVATIVE FREE 10 ML: 5 INJECTION INTRAVENOUS at 09:15

## 2023-05-20 RX ADMIN — URSODIOL 600 MG: 300 CAPSULE ORAL at 13:01

## 2023-05-20 RX ADMIN — LATANOPROST 1 DROP: 50 SOLUTION OPHTHALMIC at 22:04

## 2023-05-20 ASSESSMENT — ENCOUNTER SYMPTOMS
RESPIRATORY NEGATIVE: 1
GASTROINTESTINAL NEGATIVE: 1
EYES NEGATIVE: 1

## 2023-05-20 ASSESSMENT — PAIN SCALES - GENERAL
PAINLEVEL_OUTOF10: 0

## 2023-05-21 LAB
ANION GAP SERPL CALC-SCNC: 2 MMOL/L (ref 5–15)
BASOPHILS # BLD: 0 K/UL (ref 0–0.1)
BASOPHILS NFR BLD: 0 % (ref 0–1)
BUN SERPL-MCNC: 15 MG/DL (ref 6–20)
BUN/CREAT SERPL: 22 (ref 12–20)
CA-I BLD-MCNC: 7.6 MG/DL (ref 8.5–10.1)
CHLORIDE SERPL-SCNC: 110 MMOL/L (ref 97–108)
CO2 SERPL-SCNC: 25 MMOL/L (ref 21–32)
CREAT SERPL-MCNC: 0.67 MG/DL (ref 0.55–1.02)
DIFFERENTIAL METHOD BLD: ABNORMAL
EOSINOPHIL # BLD: 0.1 K/UL (ref 0–0.4)
EOSINOPHIL NFR BLD: 3 % (ref 0–7)
ERYTHROCYTE [DISTWIDTH] IN BLOOD BY AUTOMATED COUNT: 17.1 % (ref 11.5–14.5)
GLUCOSE SERPL-MCNC: 94 MG/DL (ref 65–100)
HCT VFR BLD AUTO: 24.2 % (ref 35–47)
HGB BLD-MCNC: 8.1 G/DL (ref 11.5–16)
IMM GRANULOCYTES # BLD AUTO: 0 K/UL (ref 0–0.04)
IMM GRANULOCYTES NFR BLD AUTO: 0 % (ref 0–0.5)
LYMPHOCYTES # BLD: 0.5 K/UL (ref 0.8–3.5)
LYMPHOCYTES NFR BLD: 13 % (ref 12–49)
MCH RBC QN AUTO: 35.2 PG (ref 26–34)
MCHC RBC AUTO-ENTMCNC: 33.5 G/DL (ref 30–36.5)
MCV RBC AUTO: 105.2 FL (ref 80–99)
MONOCYTES # BLD: 0.5 K/UL (ref 0–1)
MONOCYTES NFR BLD: 14 % (ref 5–13)
NEUTS SEG # BLD: 2.4 K/UL (ref 1.8–8)
NEUTS SEG NFR BLD: 70 % (ref 32–75)
NRBC # BLD: 0 K/UL (ref 0–0.01)
NRBC BLD-RTO: 0 PER 100 WBC
PLATELET # BLD AUTO: 82 K/UL (ref 150–400)
PMV BLD AUTO: 10 FL (ref 8.9–12.9)
POTASSIUM SERPL-SCNC: 3.6 MMOL/L (ref 3.5–5.1)
RBC # BLD AUTO: 2.3 M/UL (ref 3.8–5.2)
SODIUM SERPL-SCNC: 137 MMOL/L (ref 136–145)
WBC # BLD AUTO: 3.4 K/UL (ref 3.6–11)

## 2023-05-21 PROCEDURE — 85025 COMPLETE CBC W/AUTO DIFF WBC: CPT

## 2023-05-21 PROCEDURE — 80048 BASIC METABOLIC PNL TOTAL CA: CPT

## 2023-05-21 PROCEDURE — 36415 COLL VENOUS BLD VENIPUNCTURE: CPT

## 2023-05-21 PROCEDURE — 2580000003 HC RX 258: Performed by: NURSE PRACTITIONER

## 2023-05-21 PROCEDURE — 97110 THERAPEUTIC EXERCISES: CPT

## 2023-05-21 PROCEDURE — 6370000000 HC RX 637 (ALT 250 FOR IP): Performed by: PHYSICIAN ASSISTANT

## 2023-05-21 PROCEDURE — 1100000000 HC RM PRIVATE

## 2023-05-21 PROCEDURE — 97530 THERAPEUTIC ACTIVITIES: CPT

## 2023-05-21 PROCEDURE — 6360000002 HC RX W HCPCS: Performed by: NURSE PRACTITIONER

## 2023-05-21 PROCEDURE — 6370000000 HC RX 637 (ALT 250 FOR IP): Performed by: NURSE PRACTITIONER

## 2023-05-21 RX ADMIN — URSODIOL 600 MG: 300 CAPSULE ORAL at 18:02

## 2023-05-21 RX ADMIN — ENOXAPARIN SODIUM 40 MG: 100 INJECTION SUBCUTANEOUS at 09:30

## 2023-05-21 RX ADMIN — OXYCODONE 5 MG: 5 TABLET ORAL at 07:28

## 2023-05-21 RX ADMIN — LATANOPROST 1 DROP: 50 SOLUTION OPHTHALMIC at 21:23

## 2023-05-21 RX ADMIN — SODIUM CHLORIDE, PRESERVATIVE FREE 10 ML: 5 INJECTION INTRAVENOUS at 21:24

## 2023-05-21 RX ADMIN — SODIUM CHLORIDE, PRESERVATIVE FREE 10 ML: 5 INJECTION INTRAVENOUS at 09:31

## 2023-05-21 RX ADMIN — URSODIOL 600 MG: 300 CAPSULE ORAL at 07:28

## 2023-05-21 RX ADMIN — OXYCODONE 5 MG: 5 TABLET ORAL at 22:42

## 2023-05-21 RX ADMIN — URSODIOL 600 MG: 300 CAPSULE ORAL at 13:42

## 2023-05-21 ASSESSMENT — PAIN SCALES - GENERAL
PAINLEVEL_OUTOF10: 1
PAINLEVEL_OUTOF10: 5
PAINLEVEL_OUTOF10: 5
PAINLEVEL_OUTOF10: 6
PAINLEVEL_OUTOF10: 0

## 2023-05-21 ASSESSMENT — PAIN - FUNCTIONAL ASSESSMENT: PAIN_FUNCTIONAL_ASSESSMENT: PREVENTS OR INTERFERES SOME ACTIVE ACTIVITIES AND ADLS

## 2023-05-21 ASSESSMENT — ENCOUNTER SYMPTOMS
RESPIRATORY NEGATIVE: 1
GASTROINTESTINAL NEGATIVE: 1
EYES NEGATIVE: 1

## 2023-05-21 ASSESSMENT — PAIN SCALES - WONG BAKER: WONGBAKER_NUMERICALRESPONSE: 0

## 2023-05-21 ASSESSMENT — PAIN DESCRIPTION - DESCRIPTORS
DESCRIPTORS: ACHING
DESCRIPTORS: ACHING

## 2023-05-21 ASSESSMENT — PAIN DESCRIPTION - LOCATION
LOCATION: BACK
LOCATION: BACK;LEG

## 2023-05-21 ASSESSMENT — PAIN DESCRIPTION - ORIENTATION: ORIENTATION: MID

## 2023-05-22 PROBLEM — E66.01 SEVERE OBESITY (HCC): Status: ACTIVE | Noted: 2020-09-04

## 2023-05-22 PROBLEM — K74.60 CIRRHOSIS (HCC): Status: ACTIVE | Noted: 2018-10-12

## 2023-05-22 PROBLEM — D69.6 THROMBOCYTOPENIA (HCC): Status: ACTIVE | Noted: 2018-10-12

## 2023-05-22 PROBLEM — K74.3 PRIMARY BILIARY CHOLANGITIS (HCC): Status: ACTIVE | Noted: 2018-10-12

## 2023-05-22 PROBLEM — R18.8 ASCITES: Status: ACTIVE | Noted: 2018-10-12

## 2023-05-22 PROCEDURE — 97530 THERAPEUTIC ACTIVITIES: CPT

## 2023-05-22 PROCEDURE — 6370000000 HC RX 637 (ALT 250 FOR IP): Performed by: PHYSICIAN ASSISTANT

## 2023-05-22 PROCEDURE — 2580000003 HC RX 258: Performed by: NURSE PRACTITIONER

## 2023-05-22 PROCEDURE — 6370000000 HC RX 637 (ALT 250 FOR IP): Performed by: NURSE PRACTITIONER

## 2023-05-22 PROCEDURE — 1100000000 HC RM PRIVATE

## 2023-05-22 PROCEDURE — 6360000002 HC RX W HCPCS: Performed by: NURSE PRACTITIONER

## 2023-05-22 RX ADMIN — SODIUM CHLORIDE, PRESERVATIVE FREE 10 ML: 5 INJECTION INTRAVENOUS at 20:41

## 2023-05-22 RX ADMIN — OXYCODONE 5 MG: 5 TABLET ORAL at 22:24

## 2023-05-22 RX ADMIN — OXYCODONE 5 MG: 5 TABLET ORAL at 17:31

## 2023-05-22 RX ADMIN — LATANOPROST 1 DROP: 50 SOLUTION OPHTHALMIC at 20:31

## 2023-05-22 RX ADMIN — URSODIOL 600 MG: 300 CAPSULE ORAL at 13:44

## 2023-05-22 RX ADMIN — ENOXAPARIN SODIUM 40 MG: 100 INJECTION SUBCUTANEOUS at 08:24

## 2023-05-22 RX ADMIN — SODIUM CHLORIDE, PRESERVATIVE FREE 10 ML: 5 INJECTION INTRAVENOUS at 08:44

## 2023-05-22 RX ADMIN — URSODIOL 600 MG: 300 CAPSULE ORAL at 09:00

## 2023-05-22 RX ADMIN — OXYCODONE 5 MG: 5 TABLET ORAL at 08:25

## 2023-05-22 RX ADMIN — URSODIOL 600 MG: 300 CAPSULE ORAL at 18:34

## 2023-05-22 ASSESSMENT — PAIN DESCRIPTION - LOCATION
LOCATION: HIP

## 2023-05-22 ASSESSMENT — ENCOUNTER SYMPTOMS
ABDOMINAL PAIN: 0
ABDOMINAL DISTENTION: 1
SHORTNESS OF BREATH: 0

## 2023-05-22 ASSESSMENT — PAIN - FUNCTIONAL ASSESSMENT: PAIN_FUNCTIONAL_ASSESSMENT: ACTIVITIES ARE NOT PREVENTED

## 2023-05-22 ASSESSMENT — PAIN DESCRIPTION - ORIENTATION
ORIENTATION: LEFT

## 2023-05-22 ASSESSMENT — PAIN DESCRIPTION - DESCRIPTORS
DESCRIPTORS: ACHING

## 2023-05-22 ASSESSMENT — PAIN SCALES - GENERAL
PAINLEVEL_OUTOF10: 5
PAINLEVEL_OUTOF10: 6
PAINLEVEL_OUTOF10: 0
PAINLEVEL_OUTOF10: 6
PAINLEVEL_OUTOF10: 0

## 2023-05-22 ASSESSMENT — PAIN SCALES - WONG BAKER: WONGBAKER_NUMERICALRESPONSE: 0

## 2023-05-22 NOTE — CARE COORDINATION
CM met with patient to discuss DCP, patient recc for SNF, patient agreeable, choice given for [facility] and [facility]. CM discussed transport with patient and that she would likely need transport from family on d/c, she stated her daughter should be able transport on d/c.  CM explained that she would need to be approved by insurance and that it may take a few days to approve, patient verbalized understanding. Referrals sent via careport. Patient declined by [facility], accepted at [facility], [address] requested [facility] start insurance auth. CM continues to follow and monitor for needs.

## 2023-05-22 NOTE — PROGRESS NOTES
Hospitalist Progress Note               Daily Progress Note: 5/22/2023 1:53 PM  Hospital course:      Adam El is a 68 y.o. female who presents to the ER with cc of pain following GLF. Patient states yesterday she tripped and fell onto her left hip. Since this time she has been experiencing severe L hip pain and difficulty ambulating,  She was able to take several small steps with assistance, however this causes severe pain. No pain at rest.  Also having some lower achy back pain but  hip pain is her primary concern. No numbness nor tingling in lower extremities. No saddle anesthesia. No difficulty urinating or stooling. She did not hit her head. No loss of consciousness. No neck pain     In the ED hip xr showed osteopenia with acute fractures of the left superior and inferior pubic rami. CT lumbar showed severe osteopenia with multiple compression deformity is which are favored to be chronic. Note there may be an acute on chronic superior plate deformities of  L1. MR imaging would better evaluate. She was evaluated by ortho with recommendation for conservative management. Patient admitted for pain management and therapy eval. PT/OT recommend rehab placement. CM consulted       Subjective:     Examined patient at bedside. Currently resting comfortably without complaint. Assessment/Plan:   Principal Problem:    Fracture  Active Problems: Thrombocytopenia ([address]    Cirrhosis ([address]    Primary biliary cholangitis (HCC)    Ascites    Severe obesity (HCC)    Pubic ramus fracture, left, closed, initial encounter ([facility]  Resolved Problems:    * No resolved hospital problems. *    Chronic compression fractures of the lumbar spine  -CT of the lumbar spine shows possible chronic compression fractures. Currently patient has no back pain. Conservative management per orthopedics. Left pelvic ramus fracture  -X-ray of the pelvis shows pubic fractures. Conservative management at this time.   Oxycodone 5 to 10

## 2023-05-23 LAB
ANION GAP SERPL CALC-SCNC: ABNORMAL MMOL/L (ref 5–15)
BASOPHILS # BLD: 0 K/UL (ref 0–0.1)
BASOPHILS NFR BLD: 1 % (ref 0–1)
BUN SERPL-MCNC: 12 MG/DL (ref 6–20)
BUN/CREAT SERPL: 20 (ref 12–20)
CA-I BLD-MCNC: 7.8 MG/DL (ref 8.5–10.1)
CHLORIDE SERPL-SCNC: 110 MMOL/L (ref 97–108)
CO2 SERPL-SCNC: 27 MMOL/L (ref 21–32)
CREAT SERPL-MCNC: 0.6 MG/DL (ref 0.55–1.02)
DIFFERENTIAL METHOD BLD: ABNORMAL
EOSINOPHIL # BLD: 0.1 K/UL (ref 0–0.4)
EOSINOPHIL NFR BLD: 4 % (ref 0–7)
ERYTHROCYTE [DISTWIDTH] IN BLOOD BY AUTOMATED COUNT: 16.8 % (ref 11.5–14.5)
GLUCOSE SERPL-MCNC: 111 MG/DL (ref 65–100)
HCT VFR BLD AUTO: 23.5 % (ref 35–47)
HGB BLD-MCNC: 8 G/DL (ref 11.5–16)
IMM GRANULOCYTES # BLD AUTO: 0 K/UL (ref 0–0.04)
IMM GRANULOCYTES NFR BLD AUTO: 0 % (ref 0–0.5)
LYMPHOCYTES # BLD: 0.5 K/UL (ref 0.8–3.5)
LYMPHOCYTES NFR BLD: 15 % (ref 12–49)
MCH RBC QN AUTO: 36.4 PG (ref 26–34)
MCHC RBC AUTO-ENTMCNC: 34 G/DL (ref 30–36.5)
MCV RBC AUTO: 106.8 FL (ref 80–99)
MONOCYTES # BLD: 0.7 K/UL (ref 0–1)
MONOCYTES NFR BLD: 19 % (ref 5–13)
NEUTS SEG # BLD: 2.1 K/UL (ref 1.8–8)
NEUTS SEG NFR BLD: 61 % (ref 32–75)
NRBC # BLD: 0 K/UL (ref 0–0.01)
NRBC BLD-RTO: 0 PER 100 WBC
PLATELET # BLD AUTO: 86 K/UL (ref 150–400)
PMV BLD AUTO: 9.9 FL (ref 8.9–12.9)
POTASSIUM SERPL-SCNC: 4.1 MMOL/L (ref 3.5–5.1)
RBC # BLD AUTO: 2.2 M/UL (ref 3.8–5.2)
SODIUM SERPL-SCNC: 136 MMOL/L (ref 136–145)
WBC # BLD AUTO: 3.5 K/UL (ref 3.6–11)

## 2023-05-23 PROCEDURE — 1100000000 HC RM PRIVATE

## 2023-05-23 PROCEDURE — 6370000000 HC RX 637 (ALT 250 FOR IP): Performed by: NURSE PRACTITIONER

## 2023-05-23 PROCEDURE — 6360000002 HC RX W HCPCS: Performed by: NURSE PRACTITIONER

## 2023-05-23 PROCEDURE — 85025 COMPLETE CBC W/AUTO DIFF WBC: CPT

## 2023-05-23 PROCEDURE — 97530 THERAPEUTIC ACTIVITIES: CPT

## 2023-05-23 PROCEDURE — 6370000000 HC RX 637 (ALT 250 FOR IP): Performed by: PHYSICIAN ASSISTANT

## 2023-05-23 PROCEDURE — 36415 COLL VENOUS BLD VENIPUNCTURE: CPT

## 2023-05-23 PROCEDURE — 80048 BASIC METABOLIC PNL TOTAL CA: CPT

## 2023-05-23 PROCEDURE — 2580000003 HC RX 258: Performed by: NURSE PRACTITIONER

## 2023-05-23 PROCEDURE — 97110 THERAPEUTIC EXERCISES: CPT

## 2023-05-23 RX ADMIN — URSODIOL 600 MG: 300 CAPSULE ORAL at 17:04

## 2023-05-23 RX ADMIN — URSODIOL 300 MG: 300 CAPSULE ORAL at 12:39

## 2023-05-23 RX ADMIN — LATANOPROST 1 DROP: 50 SOLUTION OPHTHALMIC at 21:08

## 2023-05-23 RX ADMIN — OXYCODONE 5 MG: 5 TABLET ORAL at 08:21

## 2023-05-23 RX ADMIN — SODIUM CHLORIDE, PRESERVATIVE FREE 10 ML: 5 INJECTION INTRAVENOUS at 21:10

## 2023-05-23 RX ADMIN — OXYCODONE 5 MG: 5 TABLET ORAL at 22:38

## 2023-05-23 RX ADMIN — SODIUM CHLORIDE, PRESERVATIVE FREE 10 ML: 5 INJECTION INTRAVENOUS at 08:21

## 2023-05-23 RX ADMIN — URSODIOL 600 MG: 300 CAPSULE ORAL at 08:21

## 2023-05-23 RX ADMIN — ENOXAPARIN SODIUM 40 MG: 100 INJECTION SUBCUTANEOUS at 08:21

## 2023-05-23 ASSESSMENT — PAIN DESCRIPTION - LOCATION
LOCATION: GROIN
LOCATION: HIP

## 2023-05-23 ASSESSMENT — PAIN DESCRIPTION - DESCRIPTORS: DESCRIPTORS: ACHING

## 2023-05-23 ASSESSMENT — PAIN SCALES - GENERAL
PAINLEVEL_OUTOF10: 0
PAINLEVEL_OUTOF10: 0
PAINLEVEL_OUTOF10: 2
PAINLEVEL_OUTOF10: 5
PAINLEVEL_OUTOF10: 0
PAINLEVEL_OUTOF10: 3

## 2023-05-23 ASSESSMENT — PAIN - FUNCTIONAL ASSESSMENT: PAIN_FUNCTIONAL_ASSESSMENT: PREVENTS OR INTERFERES WITH MANY ACTIVE NOT PASSIVE ACTIVITIES

## 2023-05-23 ASSESSMENT — ENCOUNTER SYMPTOMS
ABDOMINAL PAIN: 0
SHORTNESS OF BREATH: 0
ABDOMINAL DISTENTION: 1

## 2023-05-23 ASSESSMENT — PAIN DESCRIPTION - ORIENTATION
ORIENTATION: LEFT
ORIENTATION: LEFT

## 2023-05-23 ASSESSMENT — PAIN SCALES - WONG BAKER: WONGBAKER_NUMERICALRESPONSE: 0

## 2023-05-23 NOTE — PROGRESS NOTES
OCCUPATIONAL THERAPY TREATMENT  Patient: Nicolas Alvarado (68 y.o. female)  Date: 5/23/2023  Primary Diagnosis: Fracture [T14. 8XXA]  Ambulatory dysfunction [R26.2]  Closed fracture of multiple rami of left pubis, initial encounter ([facility] [S32.592A]  Pubic ramus fracture, left, closed, initial encounter ([facility] [address] [S32.592A]       Precautions: Fall Risk   Left Lower Extremity Weight Bearing: Weight Bearing As Tolerated     Spinal Precautions: No Lifting, No Twisting, No Bending      In place during session: None  Chart, occupational therapy assessment, plan of care, and goals were reviewed. ASSESSMENT  Patient continues with skilled OT services and is progressing towards goals. Pt received semi-supine in bed upon arrival, AXO x4 and agreeable to KING tx at this time. Pt cooperative and demonstrated good effort during activities. Pt IND for Pt tolerated therapy session fairly with c/o pain 5/10 with movement. Pt much improved with bed mobility>EOB using bed rail with additional time d/t pain and decreased mobility. Pt completed bed>chair tf using RW/gait belt with CGA and vc's for hand placement for safety. Pt presents with slow gait and has difficulty WB on L LE d/t pain. Pt completed grooming and UB dressing with set-up d/t pain. Seated, patient educated on and completed bilateral UE HEP to increase strength/endurance needed for ADL'S and functional transfers, see grid below. Pt much improved with decreased pain during activities. Overall, pt continues to present with deficits in generalized strength/AROM, dynamic standing balance, pain and functional activity tolerance during performance of ADLs/mobility (see below for objective details and assist levels). Will continue to progress. Recommend d/c to SNF once medically appropriate.         Other factors to consider for discharge: Time of onset, medical prognosis/diagnosis, severity of deficits, PLOF, functional baseline, home environment, and family support      PLAN

## 2023-05-23 NOTE — PROGRESS NOTES
Physician Progress Note      PATIENTSalome Hou  CSN #:                  [id number]  :                       1945  ADMIT DATE:       2023 10:48 AM  DISCH DATE:  Jocelin Vidal  PROVIDER #:        Immanuel Orta NP          QUERY TEXT:    Pt admitted with Left pelvic ramus fracture. Pt noted to have \"Osteopenia with   acute fractures of the left superior and inferior pubic rami\". If possible,   please document in progress notes and discharge summary if you are evaluating   and/or treating any of the following: The medical record reflects the following:  Risk Factors: Osteopenia. ..... [place] Jos[place] D/W GLF resulting in left pelvic ramus   fracture, age 78F  Clinical Indicators: Per  H&P: Hip xr showed osteopenia with acute   fractures of the left superior and inferior pubic rami.  H&P: CT lumbar showed severe osteopenia with multiple compression   deformity is which are favored to be chronic   H&P: GLF resulting in left pelvic ramus fracture  Treatment: Roxicodone, X- ray Hip, Pain management    Thank You,  Patsy Freeman  Options provided:  -- Pathological Left pelvic ramus fracture due to osteopenia following fall   which would not usually break a normal, healthy bone  -- Traumatic Left pelvic ramus fracture  -- Other - I will add my own diagnosis  -- Disagree - Not applicable / Not valid  -- Disagree - Clinically unable to determine / Unknown  -- Refer to Clinical Documentation Reviewer    PROVIDER RESPONSE TEXT:    This patient has a pathological Left pelvic ramus fracture due to osteopenia   following fall which would not usually break a normal, healthy bone.     Query created by: Nicki Mota on 2023 12:33 PM      Electronically signed by:  Immanuel Orta NP 2023 2:43 PM

## 2023-05-23 NOTE — PROGRESS NOTES
Hospitalist Progress Note               Daily Progress Note: 5/23/2023 10:16 AM  Hospital course:      Domonique Emery is a 68 y.o. female who presents to the ER with cc of pain following GLF. Patient states yesterday she tripped and fell onto her left hip. Since this time she has been experiencing severe L hip pain and difficulty ambulating,  She was able to take several small steps with assistance, however this causes severe pain. No pain at rest.  Also having some lower achy back pain but  hip pain is her primary concern. No numbness nor tingling in lower extremities. No saddle anesthesia. No difficulty urinating or stooling. She did not hit her head. No loss of consciousness. No neck pain     In the ED hip xr showed osteopenia with acute fractures of the left superior and inferior pubic rami. CT lumbar showed severe osteopenia with multiple compression deformity is which are favored to be chronic. Note there may be an acute on chronic superior plate deformities of  L1. MR imaging would better evaluate. She was evaluated by ortho with recommendation for conservative management. Patient admitted for pain management and therapy eval. PT/OT recommend rehab placement. CM consulted     5/23 awaiting placement at SNF. Subjective: Follow-up examination of patient at bedside. Bed in bedside chair working with PT and OT. Assessment/Plan:   Principal Problem:    Fracture  Active Problems: Thrombocytopenia ([address]    Cirrhosis ([address]    Primary biliary cholangitis (HCC)    Ascites    Severe obesity (HCC)    Pubic ramus fracture, left, closed, initial encounter ([address]  Resolved Problems:    * No resolved hospital problems. *    Chronic compression fractures of the lumbar spine  -CT of the lumbar spine shows possible chronic compression fractures. Currently patient has no back pain. Conservative management per orthopedics. Left pelvic ramus fracture  -X-ray of the pelvis shows pubic fractures.   Conservative

## 2023-05-24 VITALS
OXYGEN SATURATION: 97 % | SYSTOLIC BLOOD PRESSURE: 107 MMHG | WEIGHT: 166 LBS | BODY MASS INDEX: 30.55 KG/M2 | TEMPERATURE: 98.2 F | RESPIRATION RATE: 16 BRPM | HEART RATE: 91 BPM | HEIGHT: 62 IN | DIASTOLIC BLOOD PRESSURE: 57 MMHG

## 2023-05-24 LAB
ANION GAP SERPL CALC-SCNC: 1 MMOL/L (ref 5–15)
BASOPHILS # BLD: 0 K/UL (ref 0–0.1)
BASOPHILS NFR BLD: 1 % (ref 0–1)
BUN SERPL-MCNC: 12 MG/DL (ref 6–20)
BUN/CREAT SERPL: 21 (ref 12–20)
CA-I BLD-MCNC: 7.8 MG/DL (ref 8.5–10.1)
CHLORIDE SERPL-SCNC: 107 MMOL/L (ref 97–108)
CO2 SERPL-SCNC: 26 MMOL/L (ref 21–32)
CREAT SERPL-MCNC: 0.56 MG/DL (ref 0.55–1.02)
DIFFERENTIAL METHOD BLD: ABNORMAL
EOSINOPHIL # BLD: 0.1 K/UL (ref 0–0.4)
EOSINOPHIL NFR BLD: 4 % (ref 0–7)
ERYTHROCYTE [DISTWIDTH] IN BLOOD BY AUTOMATED COUNT: 16.8 % (ref 11.5–14.5)
GLUCOSE SERPL-MCNC: 100 MG/DL (ref 65–100)
HCT VFR BLD AUTO: 25.4 % (ref 35–47)
HGB BLD-MCNC: 8.5 G/DL (ref 11.5–16)
IMM GRANULOCYTES # BLD AUTO: 0 K/UL (ref 0–0.04)
IMM GRANULOCYTES NFR BLD AUTO: 0 % (ref 0–0.5)
LYMPHOCYTES # BLD: 0.6 K/UL (ref 0.8–3.5)
LYMPHOCYTES NFR BLD: 17 % (ref 12–49)
MCH RBC QN AUTO: 36 PG (ref 26–34)
MCHC RBC AUTO-ENTMCNC: 33.5 G/DL (ref 30–36.5)
MCV RBC AUTO: 107.6 FL (ref 80–99)
MONOCYTES # BLD: 0.6 K/UL (ref 0–1)
MONOCYTES NFR BLD: 18 % (ref 5–13)
NEUTS SEG # BLD: 2.2 K/UL (ref 1.8–8)
NEUTS SEG NFR BLD: 60 % (ref 32–75)
NRBC # BLD: 0 K/UL (ref 0–0.01)
NRBC BLD-RTO: 0 PER 100 WBC
PLATELET # BLD AUTO: 95 K/UL (ref 150–400)
PMV BLD AUTO: 9.9 FL (ref 8.9–12.9)
POTASSIUM SERPL-SCNC: 3.7 MMOL/L (ref 3.5–5.1)
RBC # BLD AUTO: 2.36 M/UL (ref 3.8–5.2)
SODIUM SERPL-SCNC: 134 MMOL/L (ref 136–145)
WBC # BLD AUTO: 3.6 K/UL (ref 3.6–11)

## 2023-05-24 PROCEDURE — 97530 THERAPEUTIC ACTIVITIES: CPT

## 2023-05-24 PROCEDURE — 85025 COMPLETE CBC W/AUTO DIFF WBC: CPT

## 2023-05-24 PROCEDURE — 36415 COLL VENOUS BLD VENIPUNCTURE: CPT

## 2023-05-24 PROCEDURE — 80048 BASIC METABOLIC PNL TOTAL CA: CPT

## 2023-05-24 PROCEDURE — 2580000003 HC RX 258: Performed by: NURSE PRACTITIONER

## 2023-05-24 PROCEDURE — 6370000000 HC RX 637 (ALT 250 FOR IP): Performed by: PHYSICIAN ASSISTANT

## 2023-05-24 PROCEDURE — 6360000002 HC RX W HCPCS: Performed by: NURSE PRACTITIONER

## 2023-05-24 PROCEDURE — 6370000000 HC RX 637 (ALT 250 FOR IP): Performed by: NURSE PRACTITIONER

## 2023-05-24 RX ORDER — ASPIRIN 81 MG/1
81 TABLET, CHEWABLE ORAL 2 TIMES DAILY
Qty: 60 TABLET | Refills: 0 | Status: SHIPPED | OUTPATIENT
Start: 2023-05-24 | End: 2023-06-23

## 2023-05-24 RX ORDER — TRAMADOL HYDROCHLORIDE 50 MG/1
50 TABLET ORAL EVERY 6 HOURS PRN
Qty: 12 TABLET | Refills: 0 | Status: SHIPPED | OUTPATIENT
Start: 2023-05-24 | End: 2023-05-27

## 2023-05-24 RX ORDER — LATANOPROST 50 UG/ML
SOLUTION/ DROPS OPHTHALMIC
COMMUNITY
Start: 2022-06-08 | End: 2023-06-06

## 2023-05-24 RX ORDER — URSODIOL 500 MG/1
TABLET, FILM COATED ORAL
COMMUNITY
Start: 2022-11-29 | End: 2023-06-06 | Stop reason: SDUPTHER

## 2023-05-24 RX ADMIN — ENOXAPARIN SODIUM 40 MG: 100 INJECTION SUBCUTANEOUS at 08:32

## 2023-05-24 RX ADMIN — URSODIOL 600 MG: 300 CAPSULE ORAL at 08:31

## 2023-05-24 RX ADMIN — URSODIOL 600 MG: 300 CAPSULE ORAL at 12:29

## 2023-05-24 RX ADMIN — SODIUM CHLORIDE, PRESERVATIVE FREE 10 ML: 5 INJECTION INTRAVENOUS at 08:32

## 2023-05-24 RX ADMIN — OXYCODONE 5 MG: 5 TABLET ORAL at 08:32

## 2023-05-24 ASSESSMENT — PAIN SCALES - WONG BAKER
WONGBAKER_NUMERICALRESPONSE: 0
WONGBAKER_NUMERICALRESPONSE: 0

## 2023-05-24 ASSESSMENT — PAIN SCALES - GENERAL
PAINLEVEL_OUTOF10: 0
PAINLEVEL_OUTOF10: 4

## 2023-05-24 NOTE — PROGRESS NOTES
Spiritual Care Assessment/Progress Note  [address]    Name: Hair Hendrickson MRN: [id number]    Age: 68 y.o. Sex: female   Language: English     Date: 5/24/2023            Total Time Calculated: 32 min              Spiritual Assessment begun in [facility]  Service Provided For[de-identified] Patient  Referral/Consult From[de-identified] Rounding  Encounter Overview/Reason : Initial Encounter    Spiritual beliefs:      [x] Involved in a poncho tradition/spiritual practice:      [x] Supported by a poncho community:      [] Claims no spiritual orientation:      [] Seeking spiritual identity:           [] Adheres to an individual form of spirituality:      [] Not able to assess:                Identified resources for coping and support system:   Support System: Family members       [x] Prayer                  [x] Devotional reading               [] Music                  [x] Guided Imagery     [] Pet visits                                        [] Other: (COMMENT)     Specific area/focus of visit   Encounter: Type: Initial Screen/Assessment  Crisis:    Spiritual/Emotional needs: Type: Spiritual Support  Ritual, Rites and Sacraments:    Grief, Loss, and Adjustments:    Ethics/Mediation:    Behavioral Health:    Palliative Care: Advance Care Planning:           Narrative: The purpose of the visit was to do a spiritual assessment on the patient. The patient was sitting up in her recliner watching television, however she welcomed the visit. She mentioned how thankful she was for her family, and how her grandchildren bring her abdullahi. She mentioned that she has been very active in her Buddhism over the years. She expressed that it did bother her to see the Buddhism in the state it is in today. She mentioned the loving supportive presence of her daughters, and their care. She shared how people seem more confused today, and she believes prayer is the answer.  She shared how grateful she was to have God's love and His

## 2023-05-24 NOTE — DISCHARGE INSTR - COC
Continuity of Care Form    Patient Name: Domonique Emery   :  [birth year]  MRN:  [id number]    Admit date:  2023  Discharge date:  2023    Code Status Order: Full Code   Advance Directives:     Admitting Physician:  Jeni Birmingham MD  PCP: Salazar Piña MD    Discharging Nurse: Reese Chavarria RN  [address] Unit/Room#: 440/01  Discharging Unit Phone Number: [phone]    Emergency Contact:   Extended Emergency Contact Information  Primary Emergency Contact: Jamar Wilson Phone: [phone]  Mobile Phone: [phone]  Relation: Child    Past Surgical History:  Past Surgical History:   Procedure Laterality Date    GYN      c sections    IR EMBOLIZATION TUMOR / ORGAN  2022    IR EMBOLIZATION TUMOR / ORGAN 2022 [facility] RAD ANGIO IR    IR EMBOLIZATION TUMOR / ORGAN  2022    UPPER GASTROINTESTINAL ENDOSCOPY         Immunization History:   Immunization History   Administered Date(s) Administered    COVID-19, MODERNA BLUE border, Primary or Immunocompromised, (age 12y+), IM, 100 mcg/0.5mL 2021, 2021       Active Problems:  Patient Active Problem List   Diagnosis Code    Thrombocytopenia ([facility] D69.6    Cirrhosis ([facility] [address] K74.60    Primary biliary cholangitis ([facility] [address] K74.3    Ascites R18.8    Severe obesity ([facility] E66.01    Fracture T14. 8XXA    Pubic ramus fracture, left, closed, initial encounter ([facility] [address] S32.592A       Isolation/Infection:   Isolation            No Isolation          Patient Infection Status       None to display            Nurse Assessment:  Last Vital Signs: BP (!) 109/51   Pulse 80   Temp 98.2 °F (36.8 °C) (Oral)   Resp 18   Ht 1.575 m (5' 2\")   Wt 75.3 kg (166 lb)   SpO2 94%   BMI 30.36 kg/m²     Last documented pain score (0-10 scale): Pain Level: 4  Last Weight:   Wt Readings from Last 1 Encounters:   23 75.3 kg (166 lb)     Mental Status:  oriented, alert, coherent, and able to concentrate and follow conversation    IV

## 2023-05-24 NOTE — PLAN OF CARE
OCCUPATIONAL THERAPY TREATMENT  Patient: Domonique Emery (64 y.o. female)  Date: 5/22/2023  Primary Diagnosis: Fracture [T14. 8XXA]  Ambulatory dysfunction [R26.2]  Closed fracture of multiple rami of left pubis, initial encounter ([facility] [address] [S32.592A]  Pubic ramus fracture, left, closed, initial encounter ([facility] [address] [S32.592A]       Precautions: Fall Risk   Left Lower Extremity Weight Bearing: Weight Bearing As Tolerated     Spinal Precautions: No Lifting, No Twisting, No Bending      In place during session: External Catheter  Chart, occupational therapy assessment, plan of care, and goals were reviewed. ASSESSMENT  Patient continues with skilled OT services and is progressing towards goals. Pt semi supine in bed upon OT arrival, agreeable to session. Pt A&O x 4. (See below for objective details and assist levels). Overall pt tolerated session fair today with completion of bed mobility, transfers, and grooming. Pt completed bed mobility with SBA and additional time overall. Pt able to complete sit>stand from EOB with CGA and additional time using RW for balance upon standing. Pt completed transfer from EOB>recliner with CGA/Mario with increased time due to slow steps and difficulty advancing LLE. Pt completed transfer into recliner with increased cueing for safe transfer technique. Reviewed UE HEP and pt verbalized understanding. Pt completed seated grooming with setup A overall and left seated in chair with call bell within reach and needs met. Will continue to benefit from skilled OT services, and will continue to progress as tolerated. Other factors to consider for discharge: poor safety awareness, high risk for falls, not safe to be alone, and concern for safely navigating or managing the home environment        PLAN :  Patient continues to benefit from skilled intervention to address the above impairments. Continue treatment per established plan of care to address goals.     Recommend with staff: Out of bed
PHYSICAL THERAPY TREATMENT     Patient: Prudence Paez (89 y.o. female)  Date: 5/23/2023  Diagnosis: Fracture [T14. 8XXA]  Ambulatory dysfunction [R26.2]  Closed fracture of multiple rami of left pubis, initial encounter ([facility] [address] [S32.592A]  Pubic ramus fracture, left, closed, initial encounter ([facility] [address] [S32.592A] Fracture      Precautions:  Fall Risk   Left Lower Extremity Weight Bearing: Weight Bearing As Tolerated     Spinal Precautions: No Lifting, No Twisting, No Bending      In place during session: None  Chart, physical therapy assessment, plan of care and goals were reviewed. ASSESSMENT  Patient continues with skilled PT services and is progressing towards goals. Pt supine in bed upon PT arrival. Pt A&O x 4. Pt refused OOB mobility with PTA this session but agreed to perform therex in bed. When asked if pt would sit EOB and perform sitting therex, pt refused saying she has already done her kicks today. Reviewed spinal precautions with pt and PTA encouraged OOB mobility once more, pt declined and stated that she is taking it slow. Educated pt that rest and taking it slow is important along with OOB mobility. (See below for objective details and assist levels). Overall pt tolerated session well today. Will continue to benefit from skilled PT services, and will continue to progress as tolerated. Current Level of Function Impacting Discharge (mobility/balance): mobility not assessed this treatment session    Other factors to consider for discharge: high risk for falls       PLAN :  Patient continues to benefit from skilled intervention to address impaired functional mobility and decreased activity tolerance. Continue treatment per established plan of care to address goals.     Recommend with staff: Out of bed to chair for meals and Encourage HEP in prep for ADLs/mobility    Recommendation for discharge: (in order for the patient to meet his/her long term goals)  Isiah Henson    IF patient
goals)  Isiah Henson    IF patient discharges home will need the following DME: TBD       SUBJECTIVE:   Patient stated Terry Cramer will try.     OBJECTIVE DATA SUMMARY:   Critical Behavior:  Orientation  Orientation Level: Oriented X4  Cognition  Overall Cognitive Status: Exceptions  Following Commands: Follows multistep commands with increased time  Safety Judgement: Good awareness of safety precautions  Insights: Fully aware of deficits  Initiation: Does not require cues  Sequencing: Requires cues for some    Functional Mobility Training:  Bed Mobility:  Bed Mobility Training  Rolling: Stand-by assistance; Additional time  Supine to Sit: Stand-by assistance; Additional time  Scooting: Stand-by assistance; Additional time  Transfers:  Transfer Training  Sit to Stand: Contact-guard assistance; Additional time  Stand to Sit: Contact-guard assistance; Additional time  Bed to Chair: Contact-guard assistance;Minimum assistance; Additional time  Balance:  Balance  Sitting: Intact  Standing: Impaired  Standing - Static: Good;Constant support  Standing - Dynamic: Fair;Constant support  Wheelchair Mobility:     Ambulation/Gait Training:     Gait  Overall Level of Assistance: Contact-guard assistance; Additional time  Base of Support: Widened  Speed/Lyndsay: Slow  Stance: Left decreased  Distance (ft): 6 Feet  Assistive Device: Walker, rolling;Gait belt      Therapeutic Exercises:     EXERCISE   Sets   Reps   Active Active Assist   Passive Self ROM   Comments   Ankle Pumps  10 [x] [] [] []    Quad Sets/Glut Sets   [] [] [] []    Hamstring Sets   [] [] [] []    Short Arc Quads   [] [] [] []    Heel Slides   [] [] [] []    Straight Leg Raises   [] [] [] []    Hip abd/add   [] [] [] []    Long Arc Quads  10 [x] [] [] []    Marching   [] [] [] []       [] [] [] []       Pain Ratin/10 pre/post mobility LLE  10/10 during mobility LLE    Activity Tolerance:   Fair     After treatment patient left in no apparent distress:   Bed
Widened  Speed/Lyndsay: Slow  Step Length: Right shortened;Left shortened  Distance (ft): 3 Feet  Assistive Device: Walker, rolling;Gait belt      Therapeutic Exercises:     EXERCISE   Sets   Reps   Active Active Assist   Passive Self ROM   Comments   Ankle Pumps  10 [x] [] [] [] Supine in bed   Quad Sets/Glut Sets   [] [] [] []    Hamstring Sets   [] [] [] []    Short Arc Quads   [] [] [] []    Heel Slides   [] [] [] []    Straight Leg Raises   [] [] [] []    Hip abd/add  10 [x] [] [] [] Supine in bed   Long Arc Quads  10 [x] [] [] [] Sitting in chair   Marching   [] [] [] []       [] [] [] []       Pain Ratin/10 in back and LLE    Pain Intervention(s):   patient medicated for pain prior to session    Activity Tolerance:   Fair     After treatment patient left in no apparent distress:   Bed locked and returned to lowest position, Patient left in no apparent distress sitting up in chair and Call bell within reach, and nsg updated     COMMUNICATION/COLLABORATION:   The patients plan of care was discussed with: Registered nurse    Patient Education  Education Given To: Patient  Education Provided: Plan of Care;Energy Conservation;Precautions; Home Exercise Program  Education Method: Verbal  Barriers to Learning: None  Education Outcome: Verbalized understanding;Demonstrated understanding    Joellen Ramos PTA  Minutes: 48    Problem: Physical Therapy - Adult  Goal: By Discharge: Performs mobility at highest level of function for planned discharge setting. See evaluation for individualized goals. Description: FUNCTIONAL STATUS PRIOR TO ADMISSION: Patient was modified independent using a rollator for functional mobility. HOME SUPPORT PRIOR TO ADMISSION: The patient lived with family but did not require assistance. Physical Therapy Goals  Initiated 2023  Pt stated goal: get better  Pt will be I with LE HEP in 7 days. Pt will perform bed mobility with Moderate Assist in 7 days.   Pt will perform

## 2023-05-24 NOTE — PROGRESS NOTES
Pt has discharge orders to SNF for today. Pt has been cleared by attending provider and consulted provider for discharge. Discharge summary has been entered. Pt is stable, alert, oriented and does not show any signs of distress. Pt is discharging without an IV, tele or rene. Discharge plan of care/case management plan validated with provider discharge order. Awaiting CM for number to call [facility] to give report.

## 2023-05-24 NOTE — CARE COORDINATION
Patient approved for SNF, patient clear to d/c from CM to [facility], room 102B, nurse report can be called to [phone]. CM met with patient to discuss d/c, patient agreeable. CM discussed transport, reminded patient she does not qualify for ambulance from insurance, CM asked about daughter transporting, she stated she would not be able to be transported by her daughter as her car is too high and she did not feel comfortable getting in the car. CM explained that she could pay privately for transport, patient agreeable, stated she would feel safest in ambulance transport and not wheelchair. CM contacted [id number] [facility], [phone], quoted [phone], patient agreeable, information provided to patient, transport set for 3:30 PM, nurse notified, IMM delivered to patient.

## 2023-05-24 NOTE — PROGRESS NOTES
OCCUPATIONAL THERAPY TREATMENT  Patient: Laura Robledo (89 y.o. female)  Date: 5/24/2023  Primary Diagnosis: Fracture [T14. 8XXA]  Ambulatory dysfunction [R26.2]  Closed fracture of multiple rami of left pubis, initial encounter ([facility] [address] [S32.592A]  Pubic ramus fracture, left, closed, initial encounter ([facility] [address] [S32.592A]       Precautions: Fall Risk   Left Lower Extremity Weight Bearing: Weight Bearing As Tolerated     Spinal Precautions: No Lifting, No Twisting, No Bending      In place during session: External Catheter  Chart, occupational therapy assessment, plan of care, and goals were reviewed. ASSESSMENT  Patient continues with skilled OT services and is progressing towards goals. Pt sitting in recliner upon OT arrival, agreeable to session. Pt A&O x 4. Pt pleasant , talkative and cooperative during session. (See below for objective details and assist levels). Overall pt tolerated session fair today with rest breaks and extra time to complete all tasks. Pt completed UE therex sitting in recliner, see grid below for details, to maintain/ increase strength and endurance to aid in ADL performance. Pt request to stand a wash jacoby area before ambulating back to bed. Pt had a very slow steady gait to bed and required vc's to reach back before sitting to insure safety and decrease pain. Pt required Mod A w/ leg up onto bed. Pt left w/ all know needs met. Will continue to benefit from skilled OT services, and will continue to progress as tolerated. Other factors to consider for discharge: PLOF, time since onset, severity of deficits and decline from functional baseline. PLAN :  Patient continues to benefit from skilled intervention to address the above impairments. Continue treatment per established plan of care to address goals. Recommend with staff: Out of bed to chair for meals and Encourage HEP in prep for ADLs/mobility    Recommend next OT session:  Toileting and standing grooming    Recommendation

## 2023-05-24 NOTE — PROGRESS NOTES
Called report to [facility] and spoke with Moreno Palacios RN. SBAR given report. Nurse stated he did not have any questions.

## 2023-05-25 ENCOUNTER — TELEPHONE (OUTPATIENT)
Age: 78
End: 2023-05-25

## 2023-05-26 ENCOUNTER — CLINICAL DOCUMENTATION (OUTPATIENT)
Age: 78
End: 2023-05-26

## 2023-05-26 ENCOUNTER — TELEPHONE (OUTPATIENT)
Age: 78
End: 2023-05-26

## 2023-05-26 NOTE — PROGRESS NOTES
[phone]. Reviewed blood work results. She plans to start Fish oil and Vitamin D. Will move up appointment.

## 2023-06-05 ENCOUNTER — TELEPHONE (OUTPATIENT)
Age: 78
End: 2023-06-05

## 2023-06-05 NOTE — TELEPHONE ENCOUNTER
Jacobo Crandall at facility called to verify when patient's appt had been r/s. Advised the 6/29 appt was moved to 5/31, however the patient called and cancelled stating no ride. Per Jacobo Crandall, the facility sets up transportation and the patient should not be cancelling her appts. Do you want me to place the patient back on 6/29 @ 3:30 or do you want to see the patient sooner?

## 2023-06-06 ENCOUNTER — TELEPHONE (OUTPATIENT)
Age: 78
End: 2023-06-06

## 2023-06-06 DIAGNOSIS — C22.0 LIVER CELL CARCINOMA (HCC): Primary | ICD-10-CM

## 2023-06-06 PROBLEM — R41.82 AMS (ALTERED MENTAL STATUS): Status: ACTIVE | Noted: 2023-06-06

## 2023-06-06 NOTE — TELEPHONE ENCOUNTER
Patient's daughter called to advise April that the patient has been at [facility] all day, she's unresponsive, yellow and has a high ammonia level. She wanted to let you know in case there's something you wanted to add in the system for the providers there. You may also call the daughter.

## 2023-06-07 ENCOUNTER — CLINICAL DOCUMENTATION (OUTPATIENT)
Age: 78
End: 2023-06-07

## 2023-06-07 NOTE — PROGRESS NOTES
Returned call to daughter regarding mother's recent fall. She is septic, remains unresponsive and is in the ICU at [facility]. Source of infection is unknown. Ammonia level remains elevated.

## 2023-07-24 ENCOUNTER — TELEPHONE (OUTPATIENT)
Age: 78
End: 2023-07-24

## 2023-07-24 NOTE — TELEPHONE ENCOUNTER
Patient was admitted to [facility] in [place] on Friday night. Patient had concerning numbers regarding bilirubin, ammonia, and INR among other values. Patient daughter would like a call back to discuss what's going on with the patient as she indicated that she asked the doctors there if they talk to us and they advised her to call our office as they do not communicate with our office regarding treatment.

## 2023-07-25 ENCOUNTER — CLINICAL DOCUMENTATION (OUTPATIENT)
Age: 78
End: 2023-07-25

## 2023-08-02 ENCOUNTER — CLINICAL DOCUMENTATION (OUTPATIENT)
Age: 78
End: 2023-08-02

## 2023-09-28 ENCOUNTER — CLINICAL DOCUMENTATION (OUTPATIENT)
Age: 78
End: 2023-09-28

## 2023-09-28 NOTE — PROGRESS NOTES
Spoke with Ms. Sami - she has communicated with patient's daughter and confirmed that patient has passed. Per [facility] records - patient entered hospice on 23 and  on 8/3/2023.

## (undated) DEVICE — ENDO CARRY-ON PROCEDURE KIT INCLUDES ENZYMATIC SPONGE, GAUZE, BIOHAZARD LABEL, TRAY, LUBRICANT, DIRTY SCOPE LABEL, WATER LABEL, TRAY, DRAWSTRING PAD, AND DEFENDO 4-PIECE KIT.: Brand: ENDO CARRY-ON PROCEDURE KIT

## (undated) DEVICE — KENDALL RADIOLUCENT FOAM MONITORING ELECTRODE -RECTANGULAR SHAPE: Brand: KENDALL

## (undated) DEVICE — Device

## (undated) DEVICE — CANN NASAL O2 CAPNOGRAPHY AD -- FILTERLINE

## (undated) DEVICE — 1200 GUARD II KIT W/5MM TUBE W/O VAC TUBE: Brand: GUARDIAN

## (undated) DEVICE — FORCEPS BX L240CM JAW DIA2.8MM L CAP W/ NDL MIC MESH TOOTH

## (undated) DEVICE — Z DISCONTINUED NO SUB IDED SET EXTN W/ 4 W STPCOCK M SPIN LOK 36IN

## (undated) DEVICE — CONTAINER SPEC 20 ML LID NEUT BUFF FORMALIN 10 % POLYPR STS

## (undated) DEVICE — BW-412T DISP COMBO CLEANING BRUSH: Brand: SINGLE USE COMBINATION CLEANING BRUSH

## (undated) DEVICE — CATH IV AUTOGRD BC BLU 22GA 25 -- INSYTE

## (undated) DEVICE — BAG BELONG PT PERS CLEAR HANDL

## (undated) DEVICE — NEONATAL-ADULT SPO2 SENSOR: Brand: NELLCOR

## (undated) DEVICE — SET ADMIN 16ML TBNG L100IN 2 Y INJ SITE IV PIGGY BK DISP

## (undated) DEVICE — NEEDLE HYPO 18GA L1.5IN PNK S STL HUB POLYPR SHLD REG BVL

## (undated) DEVICE — Device: Brand: MEDICAL ACTION INDUSTRIES

## (undated) DEVICE — BAG SPEC BIOHZD LF 2MIL 6X10IN -- CONVERT TO ITEM 357326

## (undated) DEVICE — SYRINGE MED 20ML STD CLR PLAS LUERLOCK TIP N CTRL DISP

## (undated) DEVICE — SOLIDIFIER FLUID 3000 CC ABSORB

## (undated) DEVICE — TUBING HYDR IRR --